# Patient Record
Sex: FEMALE | Race: WHITE | Employment: OTHER | URBAN - METROPOLITAN AREA
[De-identification: names, ages, dates, MRNs, and addresses within clinical notes are randomized per-mention and may not be internally consistent; named-entity substitution may affect disease eponyms.]

---

## 2020-01-01 ENCOUNTER — APPOINTMENT (OUTPATIENT)
Dept: GENERAL RADIOLOGY | Age: 75
DRG: 207 | End: 2020-01-01
Attending: INTERNAL MEDICINE
Payer: MEDICARE

## 2020-01-01 ENCOUNTER — HOSPITAL ENCOUNTER (INPATIENT)
Age: 75
LOS: 16 days | DRG: 207 | End: 2020-03-15
Attending: INTERNAL MEDICINE | Admitting: INTERNAL MEDICINE
Payer: MEDICARE

## 2020-01-01 ENCOUNTER — APPOINTMENT (OUTPATIENT)
Dept: CT IMAGING | Age: 75
DRG: 207 | End: 2020-01-01
Attending: INTERNAL MEDICINE
Payer: MEDICARE

## 2020-01-01 ENCOUNTER — APPOINTMENT (OUTPATIENT)
Dept: ULTRASOUND IMAGING | Age: 75
DRG: 207 | End: 2020-01-01
Attending: INTERNAL MEDICINE
Payer: MEDICARE

## 2020-01-01 VITALS
BODY MASS INDEX: 29.17 KG/M2 | SYSTOLIC BLOOD PRESSURE: 101 MMHG | RESPIRATION RATE: 22 BRPM | TEMPERATURE: 99.3 F | DIASTOLIC BLOOD PRESSURE: 52 MMHG | WEIGHT: 148.59 LBS | OXYGEN SATURATION: 93 % | HEIGHT: 60 IN | HEART RATE: 108 BPM

## 2020-01-01 LAB
-: ABNORMAL
-: NORMAL
-: NORMAL
ABSOLUTE EOS #: 0 K/UL (ref 0–0.4)
ABSOLUTE EOS #: 0.03 K/UL (ref 0–0.44)
ABSOLUTE EOS #: 0.04 K/UL (ref 0–0.4)
ABSOLUTE EOS #: 0.04 K/UL (ref 0–0.44)
ABSOLUTE EOS #: 0.05 K/UL (ref 0–0.44)
ABSOLUTE EOS #: 0.06 K/UL (ref 0–0.4)
ABSOLUTE EOS #: <0.03 K/UL (ref 0–0.44)
ABSOLUTE IMMATURE GRANULOCYTE: 0 K/UL (ref 0–0.3)
ABSOLUTE IMMATURE GRANULOCYTE: 0 K/UL (ref 0–0.3)
ABSOLUTE IMMATURE GRANULOCYTE: 0.06 K/UL (ref 0–0.3)
ABSOLUTE IMMATURE GRANULOCYTE: 0.07 K/UL (ref 0–0.3)
ABSOLUTE IMMATURE GRANULOCYTE: 0.09 K/UL (ref 0–0.3)
ABSOLUTE IMMATURE GRANULOCYTE: 0.09 K/UL (ref 0–0.3)
ABSOLUTE IMMATURE GRANULOCYTE: 0.1 K/UL (ref 0–0.3)
ABSOLUTE IMMATURE GRANULOCYTE: 0.11 K/UL (ref 0–0.3)
ABSOLUTE IMMATURE GRANULOCYTE: 0.11 K/UL (ref 0–0.3)
ABSOLUTE IMMATURE GRANULOCYTE: 0.13 K/UL (ref 0–0.3)
ABSOLUTE IMMATURE GRANULOCYTE: 0.17 K/UL (ref 0–0.3)
ABSOLUTE IMMATURE GRANULOCYTE: 0.18 K/UL (ref 0–0.3)
ABSOLUTE IMMATURE GRANULOCYTE: 0.2 K/UL (ref 0–0.3)
ABSOLUTE IMMATURE GRANULOCYTE: 0.21 K/UL (ref 0–0.3)
ABSOLUTE IMMATURE GRANULOCYTE: 0.27 K/UL (ref 0–0.3)
ABSOLUTE IMMATURE GRANULOCYTE: 0.48 K/UL (ref 0–0.3)
ABSOLUTE LYMPH #: 0 K/UL (ref 1–4.8)
ABSOLUTE LYMPH #: 0.2 K/UL (ref 1–4.8)
ABSOLUTE LYMPH #: 0.24 K/UL (ref 1–4.8)
ABSOLUTE LYMPH #: 0.36 K/UL (ref 1–4.8)
ABSOLUTE LYMPH #: 0.6 K/UL (ref 1–4.8)
ABSOLUTE LYMPH #: 0.87 K/UL (ref 1.1–3.7)
ABSOLUTE LYMPH #: 0.95 K/UL (ref 1.1–3.7)
ABSOLUTE LYMPH #: 1 K/UL (ref 1.1–3.7)
ABSOLUTE LYMPH #: 1 K/UL (ref 1.1–3.7)
ABSOLUTE LYMPH #: 1.09 K/UL (ref 1.1–3.7)
ABSOLUTE LYMPH #: 1.13 K/UL (ref 1.1–3.7)
ABSOLUTE LYMPH #: 1.13 K/UL (ref 1–4.8)
ABSOLUTE LYMPH #: 1.22 K/UL (ref 1–4.8)
ABSOLUTE LYMPH #: 1.38 K/UL (ref 1–4.8)
ABSOLUTE LYMPH #: 1.41 K/UL (ref 1.1–3.7)
ABSOLUTE LYMPH #: 1.62 K/UL (ref 1.1–3.7)
ABSOLUTE MONO #: 0.2 K/UL (ref 0.1–0.8)
ABSOLUTE MONO #: 0.32 K/UL (ref 0.1–0.8)
ABSOLUTE MONO #: 0.46 K/UL (ref 0.1–0.8)
ABSOLUTE MONO #: 0.5 K/UL (ref 0.1–0.8)
ABSOLUTE MONO #: 0.6 K/UL (ref 0.1–0.8)
ABSOLUTE MONO #: 0.62 K/UL (ref 0.1–1.2)
ABSOLUTE MONO #: 0.67 K/UL (ref 0.1–1.2)
ABSOLUTE MONO #: 0.72 K/UL (ref 0.1–0.8)
ABSOLUTE MONO #: 0.9 K/UL (ref 0.1–0.8)
ABSOLUTE MONO #: 1.01 K/UL (ref 0.1–1.2)
ABSOLUTE MONO #: 1.08 K/UL (ref 0.1–1.2)
ABSOLUTE MONO #: 1.14 K/UL (ref 0.1–1.2)
ABSOLUTE MONO #: 1.2 K/UL (ref 0.1–0.8)
ABSOLUTE MONO #: 1.25 K/UL (ref 0.1–1.2)
ABSOLUTE MONO #: 1.3 K/UL (ref 0.1–1.2)
ABSOLUTE MONO #: 1.31 K/UL (ref 0.1–1.2)
ACTION: NORMAL
ALBUMIN SERPL-MCNC: 2.8 G/DL (ref 3.5–5.2)
ALBUMIN/GLOBULIN RATIO: 1.3 (ref 1–2.5)
ALLEN TEST: ABNORMAL
ALLEN TEST: POSITIVE
ALP BLD-CCNC: 54 U/L (ref 35–104)
ALT SERPL-CCNC: 34 U/L (ref 5–33)
AMORPHOUS: ABNORMAL
AMORPHOUS: NORMAL
ANCA MYELOPEROXIDASE: 11 AU/ML
ANCA MYELOPEROXIDASE: 4 AU/ML
ANCA PROTEINASE 3: 10 AU/ML
ANCA PROTEINASE 3: 4 AU/ML
ANION GAP SERPL CALCULATED.3IONS-SCNC: 10 MMOL/L (ref 9–17)
ANION GAP SERPL CALCULATED.3IONS-SCNC: 11 MMOL/L (ref 9–17)
ANION GAP SERPL CALCULATED.3IONS-SCNC: 11 MMOL/L (ref 9–17)
ANION GAP SERPL CALCULATED.3IONS-SCNC: 12 MMOL/L (ref 9–17)
ANION GAP SERPL CALCULATED.3IONS-SCNC: 13 MMOL/L (ref 9–17)
ANION GAP SERPL CALCULATED.3IONS-SCNC: 13 MMOL/L (ref 9–17)
ANION GAP SERPL CALCULATED.3IONS-SCNC: 14 MMOL/L (ref 9–17)
ANION GAP SERPL CALCULATED.3IONS-SCNC: 15 MMOL/L (ref 9–17)
ANION GAP SERPL CALCULATED.3IONS-SCNC: 17 MMOL/L (ref 9–17)
ANTI-NUCLEAR ANTIBODY (ANA): NEGATIVE
AST SERPL-CCNC: 27 U/L
ATYPICAL LYMPHOCYTE ABSOLUTE COUNT: 0.13 K/UL
ATYPICAL LYMPHOCYTES: 2 %
BACTERIA: ABNORMAL
BACTERIA: NORMAL
BASOPHILS # BLD: 0 % (ref 0–2)
BASOPHILS ABSOLUTE: 0 K/UL (ref 0–0.2)
BASOPHILS ABSOLUTE: 0.03 K/UL (ref 0–0.2)
BASOPHILS ABSOLUTE: 0.04 K/UL (ref 0–0.2)
BASOPHILS ABSOLUTE: 0.05 K/UL (ref 0–0.2)
BASOPHILS ABSOLUTE: 0.05 K/UL (ref 0–0.2)
BASOPHILS ABSOLUTE: <0.03 K/UL (ref 0–0.2)
BILIRUB SERPL-MCNC: 0.21 MG/DL (ref 0.3–1.2)
BILIRUBIN URINE: NEGATIVE
BILIRUBIN URINE: NEGATIVE
BNP INTERPRETATION: ABNORMAL
BUN BLDV-MCNC: 100 MG/DL (ref 8–23)
BUN BLDV-MCNC: 104 MG/DL (ref 8–23)
BUN BLDV-MCNC: 22 MG/DL (ref 8–23)
BUN BLDV-MCNC: 25 MG/DL (ref 8–23)
BUN BLDV-MCNC: 27 MG/DL (ref 8–23)
BUN BLDV-MCNC: 29 MG/DL (ref 8–23)
BUN BLDV-MCNC: 33 MG/DL (ref 8–23)
BUN BLDV-MCNC: 39 MG/DL (ref 8–23)
BUN BLDV-MCNC: 41 MG/DL (ref 8–23)
BUN BLDV-MCNC: 48 MG/DL (ref 8–23)
BUN BLDV-MCNC: 53 MG/DL (ref 8–23)
BUN BLDV-MCNC: 54 MG/DL (ref 8–23)
BUN BLDV-MCNC: 62 MG/DL (ref 8–23)
BUN BLDV-MCNC: 72 MG/DL (ref 8–23)
BUN BLDV-MCNC: 77 MG/DL (ref 8–23)
BUN BLDV-MCNC: 85 MG/DL (ref 8–23)
BUN BLDV-MCNC: 87 MG/DL (ref 8–23)
BUN BLDV-MCNC: 95 MG/DL (ref 8–23)
BUN BLDV-MCNC: 98 MG/DL (ref 8–23)
BUN/CREAT BLD: ABNORMAL (ref 9–20)
CALCIUM SERPL-MCNC: 7.6 MG/DL (ref 8.6–10.4)
CALCIUM SERPL-MCNC: 7.6 MG/DL (ref 8.6–10.4)
CALCIUM SERPL-MCNC: 7.7 MG/DL (ref 8.6–10.4)
CALCIUM SERPL-MCNC: 7.9 MG/DL (ref 8.6–10.4)
CALCIUM SERPL-MCNC: 7.9 MG/DL (ref 8.6–10.4)
CALCIUM SERPL-MCNC: 8.6 MG/DL (ref 8.6–10.4)
CALCIUM SERPL-MCNC: 8.8 MG/DL (ref 8.6–10.4)
CALCIUM SERPL-MCNC: 8.9 MG/DL (ref 8.6–10.4)
CALCIUM SERPL-MCNC: 9 MG/DL (ref 8.6–10.4)
CALCIUM SERPL-MCNC: 9 MG/DL (ref 8.6–10.4)
CALCIUM SERPL-MCNC: 9.1 MG/DL (ref 8.6–10.4)
CALCIUM SERPL-MCNC: 9.4 MG/DL (ref 8.6–10.4)
CASTS UA: ABNORMAL /LPF (ref 0–8)
CASTS UA: NORMAL /LPF (ref 0–8)
CHLORIDE BLD-SCNC: 100 MMOL/L (ref 98–107)
CHLORIDE BLD-SCNC: 101 MMOL/L (ref 98–107)
CHLORIDE BLD-SCNC: 103 MMOL/L (ref 98–107)
CHLORIDE BLD-SCNC: 104 MMOL/L (ref 98–107)
CHLORIDE BLD-SCNC: 106 MMOL/L (ref 98–107)
CHLORIDE BLD-SCNC: 85 MMOL/L (ref 98–107)
CHLORIDE BLD-SCNC: 86 MMOL/L (ref 98–107)
CHLORIDE BLD-SCNC: 87 MMOL/L (ref 98–107)
CHLORIDE BLD-SCNC: 90 MMOL/L (ref 98–107)
CHLORIDE BLD-SCNC: 91 MMOL/L (ref 98–107)
CHLORIDE BLD-SCNC: 94 MMOL/L (ref 98–107)
CHLORIDE BLD-SCNC: 94 MMOL/L (ref 98–107)
CHLORIDE BLD-SCNC: 95 MMOL/L (ref 98–107)
CHLORIDE BLD-SCNC: 95 MMOL/L (ref 98–107)
CHLORIDE BLD-SCNC: 96 MMOL/L (ref 98–107)
CHLORIDE BLD-SCNC: 98 MMOL/L (ref 98–107)
CHLORIDE BLD-SCNC: 98 MMOL/L (ref 98–107)
CHLORIDE BLD-SCNC: 99 MMOL/L (ref 98–107)
CO2: 19 MMOL/L (ref 20–31)
CO2: 20 MMOL/L (ref 20–31)
CO2: 22 MMOL/L (ref 20–31)
CO2: 23 MMOL/L (ref 20–31)
CO2: 26 MMOL/L (ref 20–31)
CO2: 32 MMOL/L (ref 20–31)
CO2: 32 MMOL/L (ref 20–31)
CO2: 34 MMOL/L (ref 20–31)
CO2: 34 MMOL/L (ref 20–31)
CO2: 35 MMOL/L (ref 20–31)
CO2: 35 MMOL/L (ref 20–31)
CO2: 36 MMOL/L (ref 20–31)
CO2: 36 MMOL/L (ref 20–31)
CO2: 37 MMOL/L (ref 20–31)
CO2: 38 MMOL/L (ref 20–31)
CO2: 41 MMOL/L (ref 20–31)
COLOR: YELLOW
COLOR: YELLOW
COMMENT UA: ABNORMAL
COMMENT UA: ABNORMAL
COMPLEMENT C3: 70 MG/DL (ref 90–180)
COMPLEMENT C4: 29 MG/DL (ref 10–40)
CREAT SERPL-MCNC: 0.41 MG/DL (ref 0.5–0.9)
CREAT SERPL-MCNC: 0.42 MG/DL (ref 0.5–0.9)
CREAT SERPL-MCNC: 0.47 MG/DL (ref 0.5–0.9)
CREAT SERPL-MCNC: 0.62 MG/DL (ref 0.5–0.9)
CREAT SERPL-MCNC: 0.7 MG/DL (ref 0.5–0.9)
CREAT SERPL-MCNC: 0.73 MG/DL (ref 0.5–0.9)
CREAT SERPL-MCNC: 0.88 MG/DL (ref 0.5–0.9)
CREAT SERPL-MCNC: 0.91 MG/DL (ref 0.5–0.9)
CREAT SERPL-MCNC: 0.96 MG/DL (ref 0.5–0.9)
CREAT SERPL-MCNC: 1.26 MG/DL (ref 0.5–0.9)
CREAT SERPL-MCNC: 1.34 MG/DL (ref 0.5–0.9)
CREAT SERPL-MCNC: 1.65 MG/DL (ref 0.5–0.9)
CREAT SERPL-MCNC: 1.76 MG/DL (ref 0.5–0.9)
CREAT SERPL-MCNC: 2.05 MG/DL (ref 0.5–0.9)
CREAT SERPL-MCNC: 2.22 MG/DL (ref 0.5–0.9)
CREAT SERPL-MCNC: 2.41 MG/DL (ref 0.5–0.9)
CREAT SERPL-MCNC: 2.83 MG/DL (ref 0.5–0.9)
CREAT SERPL-MCNC: 2.87 MG/DL (ref 0.5–0.9)
CREAT SERPL-MCNC: 3.25 MG/DL (ref 0.5–0.9)
CREATININE URINE: 91.3 MG/DL (ref 28–217)
CREATININE URINE: 93 MG/DL (ref 28–217)
CRYSTALS, UA: ABNORMAL /HPF
CRYSTALS, UA: NORMAL /HPF
CULTURE: ABNORMAL
CULTURE: NO GROWTH
CULTURE: NO GROWTH
CULTURE: NORMAL
CULTURE: NORMAL
DATE AND TIME: NORMAL
DIFFERENTIAL TYPE: ABNORMAL
DIRECT EXAM: NORMAL
EKG ATRIAL RATE: 120 BPM
EKG ATRIAL RATE: 120 BPM
EKG ATRIAL RATE: 121 BPM
EKG ATRIAL RATE: 121 BPM
EKG ATRIAL RATE: 126 BPM
EKG ATRIAL RATE: 87 BPM
EKG ATRIAL RATE: 96 BPM
EKG P AXIS: 62 DEGREES
EKG P AXIS: 75 DEGREES
EKG P AXIS: 75 DEGREES
EKG P AXIS: 78 DEGREES
EKG P AXIS: 82 DEGREES
EKG P AXIS: 84 DEGREES
EKG P AXIS: 85 DEGREES
EKG P-R INTERVAL: 114 MS
EKG P-R INTERVAL: 120 MS
EKG P-R INTERVAL: 122 MS
EKG P-R INTERVAL: 134 MS
EKG P-R INTERVAL: 138 MS
EKG P-R INTERVAL: 150 MS
EKG P-R INTERVAL: 150 MS
EKG Q-T INTERVAL: 290 MS
EKG Q-T INTERVAL: 296 MS
EKG Q-T INTERVAL: 304 MS
EKG Q-T INTERVAL: 314 MS
EKG Q-T INTERVAL: 320 MS
EKG Q-T INTERVAL: 358 MS
EKG Q-T INTERVAL: 374 MS
EKG QRS DURATION: 66 MS
EKG QRS DURATION: 68 MS
EKG QRS DURATION: 68 MS
EKG QRS DURATION: 72 MS
EKG QRS DURATION: 72 MS
EKG QRS DURATION: 74 MS
EKG QRS DURATION: 76 MS
EKG QTC CALCULATION (BAZETT): 411 MS
EKG QTC CALCULATION (BAZETT): 418 MS
EKG QTC CALCULATION (BAZETT): 429 MS
EKG QTC CALCULATION (BAZETT): 445 MS
EKG QTC CALCULATION (BAZETT): 450 MS
EKG QTC CALCULATION (BAZETT): 452 MS
EKG QTC CALCULATION (BAZETT): 463 MS
EKG R AXIS: 76 DEGREES
EKG R AXIS: 80 DEGREES
EKG R AXIS: 81 DEGREES
EKG R AXIS: 82 DEGREES
EKG R AXIS: 82 DEGREES
EKG R AXIS: 84 DEGREES
EKG R AXIS: 86 DEGREES
EKG T AXIS: 86 DEGREES
EKG T AXIS: 88 DEGREES
EKG T AXIS: 90 DEGREES
EKG T AXIS: 90 DEGREES
EKG T AXIS: 91 DEGREES
EKG T AXIS: 94 DEGREES
EKG T AXIS: 99 DEGREES
EKG VENTRICULAR RATE: 120 BPM
EKG VENTRICULAR RATE: 120 BPM
EKG VENTRICULAR RATE: 121 BPM
EKG VENTRICULAR RATE: 121 BPM
EKG VENTRICULAR RATE: 126 BPM
EKG VENTRICULAR RATE: 87 BPM
EKG VENTRICULAR RATE: 96 BPM
EOSINOPHIL,URINE: NORMAL
EOSINOPHILS RELATIVE PERCENT: 0 % (ref 1–4)
EOSINOPHILS RELATIVE PERCENT: 1 % (ref 1–4)
EPITHELIAL CELLS UA: ABNORMAL /HPF (ref 0–5)
EPITHELIAL CELLS UA: NORMAL /HPF (ref 0–5)
ESTIMATED AVERAGE GLUCOSE: 157 MG/DL
FIO2: 100
FIO2: 30
FIO2: 30
FIO2: 35
FIO2: 40
FIO2: ABNORMAL
FREE KAPPA/LAMBDA RATIO: 2.1 (ref 0.26–1.65)
GBM ANTIBODY IGG: 10 AU/ML
GBM ANTIBODY IGG: 4 AU/ML
GFR AFRICAN AMERICAN: 17 ML/MIN
GFR AFRICAN AMERICAN: 19 ML/MIN
GFR AFRICAN AMERICAN: 20 ML/MIN
GFR AFRICAN AMERICAN: 24 ML/MIN
GFR AFRICAN AMERICAN: 26 ML/MIN
GFR AFRICAN AMERICAN: 29 ML/MIN
GFR AFRICAN AMERICAN: 34 ML/MIN
GFR AFRICAN AMERICAN: 37 ML/MIN
GFR AFRICAN AMERICAN: 47 ML/MIN
GFR AFRICAN AMERICAN: 50 ML/MIN
GFR AFRICAN AMERICAN: >60 ML/MIN
GFR NON-AFRICAN AMERICAN: 14 ML/MIN
GFR NON-AFRICAN AMERICAN: 16 ML/MIN
GFR NON-AFRICAN AMERICAN: 16 ML/MIN
GFR NON-AFRICAN AMERICAN: 20 ML/MIN
GFR NON-AFRICAN AMERICAN: 21 ML/MIN
GFR NON-AFRICAN AMERICAN: 24 ML/MIN
GFR NON-AFRICAN AMERICAN: 28 ML/MIN
GFR NON-AFRICAN AMERICAN: 30 ML/MIN
GFR NON-AFRICAN AMERICAN: 38 ML/MIN
GFR NON-AFRICAN AMERICAN: 41 ML/MIN
GFR NON-AFRICAN AMERICAN: 57 ML/MIN
GFR NON-AFRICAN AMERICAN: >60 ML/MIN
GFR SERPL CREATININE-BSD FRML MDRD: ABNORMAL ML/MIN/{1.73_M2}
GLUCOSE BLD-MCNC: 100 MG/DL (ref 74–100)
GLUCOSE BLD-MCNC: 106 MG/DL (ref 65–105)
GLUCOSE BLD-MCNC: 108 MG/DL (ref 65–105)
GLUCOSE BLD-MCNC: 108 MG/DL (ref 65–105)
GLUCOSE BLD-MCNC: 110 MG/DL (ref 70–99)
GLUCOSE BLD-MCNC: 111 MG/DL (ref 65–105)
GLUCOSE BLD-MCNC: 116 MG/DL (ref 65–105)
GLUCOSE BLD-MCNC: 117 MG/DL (ref 70–99)
GLUCOSE BLD-MCNC: 119 MG/DL (ref 65–105)
GLUCOSE BLD-MCNC: 122 MG/DL (ref 70–99)
GLUCOSE BLD-MCNC: 124 MG/DL (ref 65–105)
GLUCOSE BLD-MCNC: 125 MG/DL (ref 70–99)
GLUCOSE BLD-MCNC: 129 MG/DL (ref 65–105)
GLUCOSE BLD-MCNC: 129 MG/DL (ref 65–105)
GLUCOSE BLD-MCNC: 131 MG/DL (ref 65–105)
GLUCOSE BLD-MCNC: 134 MG/DL (ref 65–105)
GLUCOSE BLD-MCNC: 135 MG/DL (ref 65–105)
GLUCOSE BLD-MCNC: 135 MG/DL (ref 65–105)
GLUCOSE BLD-MCNC: 135 MG/DL (ref 74–100)
GLUCOSE BLD-MCNC: 137 MG/DL (ref 65–105)
GLUCOSE BLD-MCNC: 137 MG/DL (ref 65–105)
GLUCOSE BLD-MCNC: 138 MG/DL (ref 70–99)
GLUCOSE BLD-MCNC: 139 MG/DL (ref 65–105)
GLUCOSE BLD-MCNC: 141 MG/DL (ref 65–105)
GLUCOSE BLD-MCNC: 142 MG/DL (ref 65–105)
GLUCOSE BLD-MCNC: 142 MG/DL (ref 65–105)
GLUCOSE BLD-MCNC: 143 MG/DL (ref 65–105)
GLUCOSE BLD-MCNC: 145 MG/DL (ref 65–105)
GLUCOSE BLD-MCNC: 147 MG/DL (ref 65–105)
GLUCOSE BLD-MCNC: 147 MG/DL (ref 65–105)
GLUCOSE BLD-MCNC: 148 MG/DL (ref 65–105)
GLUCOSE BLD-MCNC: 149 MG/DL (ref 65–105)
GLUCOSE BLD-MCNC: 150 MG/DL (ref 65–105)
GLUCOSE BLD-MCNC: 151 MG/DL (ref 74–100)
GLUCOSE BLD-MCNC: 152 MG/DL (ref 70–99)
GLUCOSE BLD-MCNC: 152 MG/DL (ref 74–100)
GLUCOSE BLD-MCNC: 155 MG/DL (ref 65–105)
GLUCOSE BLD-MCNC: 156 MG/DL (ref 65–105)
GLUCOSE BLD-MCNC: 156 MG/DL (ref 70–99)
GLUCOSE BLD-MCNC: 156 MG/DL (ref 70–99)
GLUCOSE BLD-MCNC: 157 MG/DL (ref 65–105)
GLUCOSE BLD-MCNC: 157 MG/DL (ref 65–105)
GLUCOSE BLD-MCNC: 158 MG/DL (ref 65–105)
GLUCOSE BLD-MCNC: 159 MG/DL (ref 65–105)
GLUCOSE BLD-MCNC: 159 MG/DL (ref 65–105)
GLUCOSE BLD-MCNC: 159 MG/DL (ref 70–99)
GLUCOSE BLD-MCNC: 162 MG/DL (ref 65–105)
GLUCOSE BLD-MCNC: 162 MG/DL (ref 65–105)
GLUCOSE BLD-MCNC: 162 MG/DL (ref 74–100)
GLUCOSE BLD-MCNC: 163 MG/DL (ref 65–105)
GLUCOSE BLD-MCNC: 163 MG/DL (ref 65–105)
GLUCOSE BLD-MCNC: 164 MG/DL (ref 65–105)
GLUCOSE BLD-MCNC: 169 MG/DL (ref 65–105)
GLUCOSE BLD-MCNC: 169 MG/DL (ref 70–99)
GLUCOSE BLD-MCNC: 169 MG/DL (ref 74–100)
GLUCOSE BLD-MCNC: 171 MG/DL (ref 65–105)
GLUCOSE BLD-MCNC: 171 MG/DL (ref 74–100)
GLUCOSE BLD-MCNC: 173 MG/DL (ref 65–105)
GLUCOSE BLD-MCNC: 174 MG/DL (ref 65–105)
GLUCOSE BLD-MCNC: 174 MG/DL (ref 65–105)
GLUCOSE BLD-MCNC: 175 MG/DL (ref 65–105)
GLUCOSE BLD-MCNC: 176 MG/DL (ref 65–105)
GLUCOSE BLD-MCNC: 179 MG/DL (ref 65–105)
GLUCOSE BLD-MCNC: 180 MG/DL (ref 65–105)
GLUCOSE BLD-MCNC: 181 MG/DL (ref 70–99)
GLUCOSE BLD-MCNC: 182 MG/DL (ref 65–105)
GLUCOSE BLD-MCNC: 184 MG/DL (ref 65–105)
GLUCOSE BLD-MCNC: 186 MG/DL (ref 65–105)
GLUCOSE BLD-MCNC: 187 MG/DL (ref 65–105)
GLUCOSE BLD-MCNC: 188 MG/DL (ref 65–105)
GLUCOSE BLD-MCNC: 189 MG/DL (ref 65–105)
GLUCOSE BLD-MCNC: 190 MG/DL (ref 70–99)
GLUCOSE BLD-MCNC: 194 MG/DL (ref 74–100)
GLUCOSE BLD-MCNC: 196 MG/DL (ref 65–105)
GLUCOSE BLD-MCNC: 196 MG/DL (ref 70–99)
GLUCOSE BLD-MCNC: 197 MG/DL (ref 65–105)
GLUCOSE BLD-MCNC: 198 MG/DL (ref 65–105)
GLUCOSE BLD-MCNC: 198 MG/DL (ref 65–105)
GLUCOSE BLD-MCNC: 199 MG/DL (ref 65–105)
GLUCOSE BLD-MCNC: 201 MG/DL (ref 65–105)
GLUCOSE BLD-MCNC: 202 MG/DL (ref 65–105)
GLUCOSE BLD-MCNC: 203 MG/DL (ref 65–105)
GLUCOSE BLD-MCNC: 204 MG/DL (ref 65–105)
GLUCOSE BLD-MCNC: 206 MG/DL (ref 70–99)
GLUCOSE BLD-MCNC: 207 MG/DL (ref 65–105)
GLUCOSE BLD-MCNC: 209 MG/DL (ref 65–105)
GLUCOSE BLD-MCNC: 211 MG/DL (ref 65–105)
GLUCOSE BLD-MCNC: 215 MG/DL (ref 65–105)
GLUCOSE BLD-MCNC: 216 MG/DL (ref 65–105)
GLUCOSE BLD-MCNC: 219 MG/DL (ref 65–105)
GLUCOSE BLD-MCNC: 219 MG/DL (ref 65–105)
GLUCOSE BLD-MCNC: 221 MG/DL (ref 74–100)
GLUCOSE BLD-MCNC: 227 MG/DL (ref 65–105)
GLUCOSE BLD-MCNC: 244 MG/DL (ref 65–105)
GLUCOSE BLD-MCNC: 250 MG/DL (ref 70–99)
GLUCOSE BLD-MCNC: 262 MG/DL (ref 65–105)
GLUCOSE BLD-MCNC: 266 MG/DL (ref 65–105)
GLUCOSE BLD-MCNC: 286 MG/DL (ref 70–99)
GLUCOSE BLD-MCNC: 287 MG/DL (ref 65–105)
GLUCOSE BLD-MCNC: 289 MG/DL (ref 65–105)
GLUCOSE BLD-MCNC: 322 MG/DL (ref 74–100)
GLUCOSE BLD-MCNC: 325 MG/DL (ref 74–100)
GLUCOSE BLD-MCNC: 339 MG/DL (ref 74–100)
GLUCOSE BLD-MCNC: 75 MG/DL (ref 65–105)
GLUCOSE BLD-MCNC: 81 MG/DL (ref 65–105)
GLUCOSE BLD-MCNC: 82 MG/DL (ref 70–99)
GLUCOSE BLD-MCNC: 85 MG/DL (ref 65–105)
GLUCOSE BLD-MCNC: 88 MG/DL (ref 70–99)
GLUCOSE BLD-MCNC: 90 MG/DL (ref 70–99)
GLUCOSE BLD-MCNC: 91 MG/DL (ref 74–100)
GLUCOSE BLD-MCNC: 95 MG/DL (ref 65–105)
GLUCOSE URINE: ABNORMAL
GLUCOSE URINE: ABNORMAL
HAV IGM SER IA-ACNC: NONREACTIVE
HBA1C MFR BLD: 7.1 % (ref 4–6)
HCT VFR BLD CALC: 33 % (ref 36.3–47.1)
HCT VFR BLD CALC: 33.5 % (ref 36.3–47.1)
HCT VFR BLD CALC: 33.7 % (ref 36.3–47.1)
HCT VFR BLD CALC: 34.4 % (ref 36.3–47.1)
HCT VFR BLD CALC: 34.6 % (ref 36.3–47.1)
HCT VFR BLD CALC: 35.1 % (ref 36.3–47.1)
HCT VFR BLD CALC: 36.3 % (ref 36.3–47.1)
HCT VFR BLD CALC: 36.8 % (ref 36.3–47.1)
HCT VFR BLD CALC: 36.9 % (ref 36.3–47.1)
HCT VFR BLD CALC: 37.1 % (ref 36.3–47.1)
HCT VFR BLD CALC: 37.2 % (ref 36.3–47.1)
HCT VFR BLD CALC: 37.4 % (ref 36.3–47.1)
HCT VFR BLD CALC: 38.2 % (ref 36.3–47.1)
HCT VFR BLD CALC: 38.6 % (ref 36.3–47.1)
HCT VFR BLD CALC: 39.1 % (ref 36.3–47.1)
HCT VFR BLD CALC: 39.6 % (ref 36.3–47.1)
HCT VFR BLD CALC: 40.6 % (ref 36.3–47.1)
HCT VFR BLD CALC: 43.2 % (ref 36.3–47.1)
HEMOGLOBIN: 10.1 G/DL (ref 11.9–15.1)
HEMOGLOBIN: 10.1 G/DL (ref 11.9–15.1)
HEMOGLOBIN: 10.2 G/DL (ref 11.9–15.1)
HEMOGLOBIN: 10.3 G/DL (ref 11.9–15.1)
HEMOGLOBIN: 10.4 G/DL (ref 11.9–15.1)
HEMOGLOBIN: 10.5 G/DL (ref 11.9–15.1)
HEMOGLOBIN: 11.2 G/DL (ref 11.9–15.1)
HEMOGLOBIN: 11.3 G/DL (ref 11.9–15.1)
HEMOGLOBIN: 11.5 G/DL (ref 11.9–15.1)
HEMOGLOBIN: 11.7 G/DL (ref 11.9–15.1)
HEMOGLOBIN: 11.8 G/DL (ref 11.9–15.1)
HEMOGLOBIN: 11.8 G/DL (ref 11.9–15.1)
HEMOGLOBIN: 11.9 G/DL (ref 11.9–15.1)
HEMOGLOBIN: 12.1 G/DL (ref 11.9–15.1)
HEMOGLOBIN: 12.4 G/DL (ref 11.9–15.1)
HEMOGLOBIN: 12.5 G/DL (ref 11.9–15.1)
HEPATITIS B CORE IGM ANTIBODY: NONREACTIVE
HEPATITIS B SURFACE ANTIGEN: NONREACTIVE
HEPATITIS C ANTIBODY: NONREACTIVE
IMMATURE GRANULOCYTES: 0 %
IMMATURE GRANULOCYTES: 0 %
IMMATURE GRANULOCYTES: 1 %
IMMATURE GRANULOCYTES: 2 %
KAPPA FREE LIGHT CHAINS QNT: 2.08 MG/DL (ref 0.37–1.94)
KETONES, URINE: ABNORMAL
KETONES, URINE: NEGATIVE
LACTIC ACID, WHOLE BLOOD: 0.8 MMOL/L (ref 0.7–2.1)
LACTIC ACID: NORMAL MMOL/L
LAMBDA FREE LIGHT CHAINS QNT: 0.99 MG/DL (ref 0.57–2.63)
LEUKOCYTE ESTERASE, URINE: NEGATIVE
LEUKOCYTE ESTERASE, URINE: NEGATIVE
LV EF: 55 %
LVEF MODALITY: NORMAL
LYMPHOCYTES # BLD: 0 % (ref 24–44)
LYMPHOCYTES # BLD: 1 % (ref 24–44)
LYMPHOCYTES # BLD: 1 % (ref 24–44)
LYMPHOCYTES # BLD: 10 % (ref 24–44)
LYMPHOCYTES # BLD: 12 % (ref 24–43)
LYMPHOCYTES # BLD: 12 % (ref 24–44)
LYMPHOCYTES # BLD: 14 % (ref 24–44)
LYMPHOCYTES # BLD: 15 % (ref 24–43)
LYMPHOCYTES # BLD: 16 % (ref 24–43)
LYMPHOCYTES # BLD: 19 % (ref 24–44)
LYMPHOCYTES # BLD: 2 % (ref 24–44)
LYMPHOCYTES # BLD: 6 % (ref 24–43)
LYMPHOCYTES # BLD: 6 % (ref 24–43)
LYMPHOCYTES # BLD: 7 % (ref 24–43)
LYMPHOCYTES # BLD: 8 % (ref 24–43)
LYMPHOCYTES # BLD: 9 % (ref 24–43)
Lab: ABNORMAL
Lab: NORMAL
MAGNESIUM: 1.9 MG/DL (ref 1.6–2.6)
MAGNESIUM: 1.9 MG/DL (ref 1.6–2.6)
MAGNESIUM: 2 MG/DL (ref 1.6–2.6)
MCH RBC QN AUTO: 30 PG (ref 25.2–33.5)
MCH RBC QN AUTO: 30 PG (ref 25.2–33.5)
MCH RBC QN AUTO: 30.4 PG (ref 25.2–33.5)
MCH RBC QN AUTO: 30.5 PG (ref 25.2–33.5)
MCH RBC QN AUTO: 30.6 PG (ref 25.2–33.5)
MCH RBC QN AUTO: 30.7 PG (ref 25.2–33.5)
MCH RBC QN AUTO: 30.7 PG (ref 25.2–33.5)
MCH RBC QN AUTO: 30.8 PG (ref 25.2–33.5)
MCH RBC QN AUTO: 30.8 PG (ref 25.2–33.5)
MCH RBC QN AUTO: 30.9 PG (ref 25.2–33.5)
MCH RBC QN AUTO: 30.9 PG (ref 25.2–33.5)
MCH RBC QN AUTO: 31.2 PG (ref 25.2–33.5)
MCH RBC QN AUTO: 31.2 PG (ref 25.2–33.5)
MCH RBC QN AUTO: 31.3 PG (ref 25.2–33.5)
MCHC RBC AUTO-ENTMCNC: 27.5 G/DL (ref 28.4–34.8)
MCHC RBC AUTO-ENTMCNC: 28.9 G/DL (ref 28.4–34.8)
MCHC RBC AUTO-ENTMCNC: 29.6 G/DL (ref 28.4–34.8)
MCHC RBC AUTO-ENTMCNC: 30.1 G/DL (ref 28.4–34.8)
MCHC RBC AUTO-ENTMCNC: 30.1 G/DL (ref 28.4–34.8)
MCHC RBC AUTO-ENTMCNC: 30.2 G/DL (ref 28.4–34.8)
MCHC RBC AUTO-ENTMCNC: 30.3 G/DL (ref 28.4–34.8)
MCHC RBC AUTO-ENTMCNC: 30.4 G/DL (ref 28.4–34.8)
MCHC RBC AUTO-ENTMCNC: 30.5 G/DL (ref 28.4–34.8)
MCHC RBC AUTO-ENTMCNC: 30.5 G/DL (ref 28.4–34.8)
MCHC RBC AUTO-ENTMCNC: 30.6 G/DL (ref 28.4–34.8)
MCHC RBC AUTO-ENTMCNC: 30.9 G/DL (ref 28.4–34.8)
MCHC RBC AUTO-ENTMCNC: 31.3 G/DL (ref 28.4–34.8)
MCHC RBC AUTO-ENTMCNC: 32.1 G/DL (ref 28.4–34.8)
MCHC RBC AUTO-ENTMCNC: 32.3 G/DL (ref 28.4–34.8)
MCHC RBC AUTO-ENTMCNC: 32.5 G/DL (ref 28.4–34.8)
MCHC RBC AUTO-ENTMCNC: 32.6 G/DL (ref 28.4–34.8)
MCHC RBC AUTO-ENTMCNC: 33.3 G/DL (ref 28.4–34.8)
MCV RBC AUTO: 100.3 FL (ref 82.6–102.9)
MCV RBC AUTO: 100.9 FL (ref 82.6–102.9)
MCV RBC AUTO: 101 FL (ref 82.6–102.9)
MCV RBC AUTO: 101.4 FL (ref 82.6–102.9)
MCV RBC AUTO: 101.8 FL (ref 82.6–102.9)
MCV RBC AUTO: 102.4 FL (ref 82.6–102.9)
MCV RBC AUTO: 102.4 FL (ref 82.6–102.9)
MCV RBC AUTO: 103.2 FL (ref 82.6–102.9)
MCV RBC AUTO: 103.6 FL (ref 82.6–102.9)
MCV RBC AUTO: 110.3 FL (ref 82.6–102.9)
MCV RBC AUTO: 93.6 FL (ref 82.6–102.9)
MCV RBC AUTO: 93.9 FL (ref 82.6–102.9)
MCV RBC AUTO: 95 FL (ref 82.6–102.9)
MCV RBC AUTO: 95.5 FL (ref 82.6–102.9)
MCV RBC AUTO: 95.6 FL (ref 82.6–102.9)
MCV RBC AUTO: 97.1 FL (ref 82.6–102.9)
MCV RBC AUTO: 98.7 FL (ref 82.6–102.9)
MCV RBC AUTO: 99.5 FL (ref 82.6–102.9)
MODE: ABNORMAL
MONOCYTES # BLD: 1 % (ref 1–7)
MONOCYTES # BLD: 10 % (ref 3–12)
MONOCYTES # BLD: 11 % (ref 3–12)
MONOCYTES # BLD: 14 % (ref 1–7)
MONOCYTES # BLD: 3 % (ref 1–7)
MONOCYTES # BLD: 4 % (ref 1–7)
MONOCYTES # BLD: 4 % (ref 1–7)
MONOCYTES # BLD: 5 % (ref 1–7)
MONOCYTES # BLD: 5 % (ref 1–7)
MONOCYTES # BLD: 7 % (ref 3–12)
MONOCYTES # BLD: 8 % (ref 1–7)
MONOCYTES # BLD: 8 % (ref 3–12)
MONOCYTES # BLD: 9 % (ref 3–12)
MORPHOLOGY: ABNORMAL
MORPHOLOGY: NORMAL
MRSA, DNA, NASAL: NORMAL
MUCUS: ABNORMAL
MUCUS: NORMAL
MYOGLOBIN URINE: 2 MG/L (ref 0–1)
NEGATIVE BASE EXCESS, ART: 2 (ref 0–2)
NEGATIVE BASE EXCESS, ART: 3 (ref 0–2)
NEGATIVE BASE EXCESS, ART: 4 (ref 0–2)
NEGATIVE BASE EXCESS, ART: 5 (ref 0–2)
NEGATIVE BASE EXCESS, ART: 7 (ref 0–2)
NEGATIVE BASE EXCESS, ART: 7 (ref 0–2)
NEGATIVE BASE EXCESS, ART: ABNORMAL (ref 0–2)
NITRITE, URINE: NEGATIVE
NITRITE, URINE: NEGATIVE
NOTIFY: NORMAL
NRBC AUTOMATED: 0 PER 100 WBC
NRBC AUTOMATED: 0.1 PER 100 WBC
NRBC AUTOMATED: 0.2 PER 100 WBC
NRBC AUTOMATED: 0.3 PER 100 WBC
NRBC AUTOMATED: 0.4 PER 100 WBC
NRBC AUTOMATED: 0.5 PER 100 WBC
NRBC AUTOMATED: 0.5 PER 100 WBC
NRBC AUTOMATED: 0.8 PER 100 WBC
NRBC AUTOMATED: 1 PER 100 WBC
NRBC AUTOMATED: 1.3 PER 100 WBC
NRBC AUTOMATED: 2.1 PER 100 WBC
NUCLEATED RED BLOOD CELLS: 1 PER 100 WBC
NUCLEATED RED BLOOD CELLS: 1 PER 100 WBC
NUCLEATED RED BLOOD CELLS: 2 PER 100 WBC
NUCLEATED RED BLOOD CELLS: 3 PER 100 WBC
O2 DEVICE/FLOW/%: ABNORMAL
OTHER OBSERVATIONS UA: ABNORMAL
OTHER OBSERVATIONS UA: NORMAL
PARTIAL THROMBOPLASTIN TIME: 100 SEC (ref 20.5–30.5)
PARTIAL THROMBOPLASTIN TIME: 112 SEC (ref 20.5–30.5)
PARTIAL THROMBOPLASTIN TIME: 112.6 SEC (ref 20.5–30.5)
PARTIAL THROMBOPLASTIN TIME: 21.2 SEC (ref 20.5–30.5)
PARTIAL THROMBOPLASTIN TIME: 26.2 SEC (ref 20.5–30.5)
PARTIAL THROMBOPLASTIN TIME: 38.6 SEC (ref 20.5–30.5)
PARTIAL THROMBOPLASTIN TIME: 40.3 SEC (ref 20.5–30.5)
PARTIAL THROMBOPLASTIN TIME: 42 SEC (ref 20.5–30.5)
PARTIAL THROMBOPLASTIN TIME: 42.4 SEC (ref 20.5–30.5)
PARTIAL THROMBOPLASTIN TIME: 43.1 SEC (ref 20.5–30.5)
PARTIAL THROMBOPLASTIN TIME: 45.7 SEC (ref 20.5–30.5)
PARTIAL THROMBOPLASTIN TIME: 47.6 SEC (ref 20.5–30.5)
PARTIAL THROMBOPLASTIN TIME: 48.5 SEC (ref 20.5–30.5)
PARTIAL THROMBOPLASTIN TIME: 48.8 SEC (ref 20.5–30.5)
PARTIAL THROMBOPLASTIN TIME: 49.4 SEC (ref 20.5–30.5)
PARTIAL THROMBOPLASTIN TIME: 51 SEC (ref 20.5–30.5)
PARTIAL THROMBOPLASTIN TIME: 51.2 SEC (ref 20.5–30.5)
PARTIAL THROMBOPLASTIN TIME: 52.4 SEC (ref 20.5–30.5)
PARTIAL THROMBOPLASTIN TIME: 53 SEC (ref 20.5–30.5)
PARTIAL THROMBOPLASTIN TIME: 53.2 SEC (ref 20.5–30.5)
PARTIAL THROMBOPLASTIN TIME: 54.3 SEC (ref 20.5–30.5)
PARTIAL THROMBOPLASTIN TIME: 56.1 SEC (ref 20.5–30.5)
PARTIAL THROMBOPLASTIN TIME: 59.6 SEC (ref 20.5–30.5)
PARTIAL THROMBOPLASTIN TIME: 60 SEC (ref 20.5–30.5)
PARTIAL THROMBOPLASTIN TIME: 63.8 SEC (ref 20.5–30.5)
PARTIAL THROMBOPLASTIN TIME: 66.8 SEC (ref 20.5–30.5)
PARTIAL THROMBOPLASTIN TIME: 69.4 SEC (ref 20.5–30.5)
PARTIAL THROMBOPLASTIN TIME: 73.9 SEC (ref 20.5–30.5)
PARTIAL THROMBOPLASTIN TIME: 75.7 SEC (ref 20.5–30.5)
PARTIAL THROMBOPLASTIN TIME: 78.2 SEC (ref 20.5–30.5)
PARTIAL THROMBOPLASTIN TIME: 92.6 SEC (ref 20.5–30.5)
PARTIAL THROMBOPLASTIN TIME: 93.1 SEC (ref 20.5–30.5)
PARTIAL THROMBOPLASTIN TIME: >120 SEC (ref 20.5–30.5)
PARTIAL THROMBOPLASTIN TIME: >120 SEC (ref 20.5–30.5)
PATHOLOGIST: NORMAL
PATIENT TEMP: 37
PATIENT TEMP: ABNORMAL
PDW BLD-RTO: 14.3 % (ref 11.8–14.4)
PDW BLD-RTO: 14.3 % (ref 11.8–14.4)
PDW BLD-RTO: 14.6 % (ref 11.8–14.4)
PDW BLD-RTO: 14.7 % (ref 11.8–14.4)
PDW BLD-RTO: 14.9 % (ref 11.8–14.4)
PDW BLD-RTO: 15 % (ref 11.8–14.4)
PDW BLD-RTO: 15.1 % (ref 11.8–14.4)
PDW BLD-RTO: 15.1 % (ref 11.8–14.4)
PDW BLD-RTO: 15.2 % (ref 11.8–14.4)
PDW BLD-RTO: 15.3 % (ref 11.8–14.4)
PDW BLD-RTO: 15.3 % (ref 11.8–14.4)
PDW BLD-RTO: 15.4 % (ref 11.8–14.4)
PDW BLD-RTO: 15.5 % (ref 11.8–14.4)
PH UA: 5 (ref 5–8)
PH UA: 5 (ref 5–8)
PLATELET # BLD: 119 K/UL (ref 138–453)
PLATELET # BLD: 125 K/UL (ref 138–453)
PLATELET # BLD: 143 K/UL (ref 138–453)
PLATELET # BLD: 147 K/UL (ref 138–453)
PLATELET # BLD: 191 K/UL (ref 138–453)
PLATELET # BLD: 210 K/UL (ref 138–453)
PLATELET # BLD: 216 K/UL (ref 138–453)
PLATELET # BLD: 218 K/UL (ref 138–453)
PLATELET # BLD: 229 K/UL (ref 138–453)
PLATELET # BLD: 233 K/UL (ref 138–453)
PLATELET # BLD: 236 K/UL (ref 138–453)
PLATELET # BLD: 242 K/UL (ref 138–453)
PLATELET # BLD: 243 K/UL (ref 138–453)
PLATELET # BLD: 247 K/UL (ref 138–453)
PLATELET # BLD: 248 K/UL (ref 138–453)
PLATELET # BLD: ABNORMAL K/UL (ref 138–453)
PLATELET ESTIMATE: ABNORMAL
PLATELET, FLUORESCENCE: 106 K/UL (ref 138–453)
PLATELET, FLUORESCENCE: 116 K/UL (ref 138–453)
PLATELET, FLUORESCENCE: NORMAL K/UL (ref 138–453)
PLATELET, IMMATURE FRACTION: 4.9 % (ref 1.1–10.3)
PLATELET, IMMATURE FRACTION: 6.2 % (ref 1.1–10.3)
PLATELET, IMMATURE FRACTION: NORMAL % (ref 1.1–10.3)
PMV BLD AUTO: 10 FL (ref 8.1–13.5)
PMV BLD AUTO: 10.4 FL (ref 8.1–13.5)
PMV BLD AUTO: 10.6 FL (ref 8.1–13.5)
PMV BLD AUTO: 10.9 FL (ref 8.1–13.5)
PMV BLD AUTO: 11.3 FL (ref 8.1–13.5)
PMV BLD AUTO: 11.3 FL (ref 8.1–13.5)
PMV BLD AUTO: 11.4 FL (ref 8.1–13.5)
PMV BLD AUTO: 11.4 FL (ref 8.1–13.5)
PMV BLD AUTO: 11.5 FL (ref 8.1–13.5)
PMV BLD AUTO: 11.7 FL (ref 8.1–13.5)
PMV BLD AUTO: 11.7 FL (ref 8.1–13.5)
PMV BLD AUTO: 11.8 FL (ref 8.1–13.5)
PMV BLD AUTO: 12.1 FL (ref 8.1–13.5)
PMV BLD AUTO: ABNORMAL FL (ref 8.1–13.5)
POC HCO3: 24.4 MMOL/L (ref 21–28)
POC HCO3: 25.2 MMOL/L (ref 21–28)
POC HCO3: 25.2 MMOL/L (ref 21–28)
POC HCO3: 25.3 MMOL/L (ref 21–28)
POC HCO3: 25.7 MMOL/L (ref 21–28)
POC HCO3: 25.8 MMOL/L (ref 21–28)
POC HCO3: 25.9 MMOL/L (ref 21–28)
POC HCO3: 26.3 MMOL/L (ref 21–28)
POC HCO3: 26.5 MMOL/L (ref 21–28)
POC HCO3: 26.7 MMOL/L (ref 21–28)
POC HCO3: 26.8 MMOL/L (ref 21–28)
POC HCO3: 27 MMOL/L (ref 21–28)
POC HCO3: 27.2 MMOL/L (ref 21–28)
POC HCO3: 28.1 MMOL/L (ref 21–28)
POC HCO3: 28.6 MMOL/L (ref 21–28)
POC HCO3: 30.6 MMOL/L (ref 21–28)
POC HCO3: 37.5 MMOL/L (ref 21–28)
POC HCO3: 39.4 MMOL/L (ref 21–28)
POC HCO3: 40.6 MMOL/L (ref 21–28)
POC HCO3: 41.2 MMOL/L (ref 21–28)
POC HCO3: 42.2 MMOL/L (ref 21–28)
POC HCO3: 42.3 MMOL/L (ref 21–28)
POC HCO3: 42.8 MMOL/L (ref 21–28)
POC HCO3: 43.5 MMOL/L (ref 21–28)
POC HCO3: 43.8 MMOL/L (ref 21–28)
POC HCO3: 44.1 MMOL/L (ref 21–28)
POC HCO3: 44.5 MMOL/L (ref 21–28)
POC HCO3: 47 MMOL/L (ref 21–28)
POC HCO3: 48.6 MMOL/L (ref 21–28)
POC HCO3: 50.4 MMOL/L (ref 21–28)
POC LACTIC ACID: 0.51 MMOL/L (ref 0.56–1.39)
POC LACTIC ACID: 0.96 MMOL/L (ref 0.56–1.39)
POC LACTIC ACID: 1.02 MMOL/L (ref 0.56–1.39)
POC LACTIC ACID: 1.66 MMOL/L (ref 0.56–1.39)
POC LACTIC ACID: 2.12 MMOL/L (ref 0.56–1.39)
POC LACTIC ACID: 2.19 MMOL/L (ref 0.56–1.39)
POC LACTIC ACID: <0.3 MMOL/L (ref 0.56–1.39)
POC O2 SATURATION: 100 % (ref 94–98)
POC O2 SATURATION: 84 % (ref 94–98)
POC O2 SATURATION: 90 % (ref 94–98)
POC O2 SATURATION: 91 % (ref 94–98)
POC O2 SATURATION: 91 % (ref 94–98)
POC O2 SATURATION: 92 % (ref 94–98)
POC O2 SATURATION: 93 % (ref 94–98)
POC O2 SATURATION: 94 % (ref 94–98)
POC O2 SATURATION: 95 % (ref 94–98)
POC O2 SATURATION: 96 % (ref 94–98)
POC O2 SATURATION: 96 % (ref 94–98)
POC O2 SATURATION: 97 % (ref 94–98)
POC O2 SATURATION: 98 % (ref 94–98)
POC O2 SATURATION: 99 % (ref 94–98)
POC O2 SATURATION: 99 % (ref 94–98)
POC PCO2 TEMP: ABNORMAL MM HG
POC PCO2: 42.7 MM HG (ref 35–48)
POC PCO2: 53.5 MM HG (ref 35–48)
POC PCO2: 54.9 MM HG (ref 35–48)
POC PCO2: 56.5 MM HG (ref 35–48)
POC PCO2: 56.8 MM HG (ref 35–48)
POC PCO2: 57.7 MM HG (ref 35–48)
POC PCO2: 57.8 MM HG (ref 35–48)
POC PCO2: 59.9 MM HG (ref 35–48)
POC PCO2: 62.8 MM HG (ref 35–48)
POC PCO2: 63 MM HG (ref 35–48)
POC PCO2: 66.4 MM HG (ref 35–48)
POC PCO2: 66.5 MM HG (ref 35–48)
POC PCO2: 67.5 MM HG (ref 35–48)
POC PCO2: 69.4 MM HG (ref 35–48)
POC PCO2: 69.4 MM HG (ref 35–48)
POC PCO2: 70.3 MM HG (ref 35–48)
POC PCO2: 70.4 MM HG (ref 35–48)
POC PCO2: 71 MM HG (ref 35–48)
POC PCO2: 74.1 MM HG (ref 35–48)
POC PCO2: 74.5 MM HG (ref 35–48)
POC PCO2: 75.6 MM HG (ref 35–48)
POC PCO2: 76.1 MM HG (ref 35–48)
POC PCO2: 77.1 MM HG (ref 35–48)
POC PCO2: 82.9 MM HG (ref 35–48)
POC PCO2: 83.3 MM HG (ref 35–48)
POC PCO2: 86.8 MM HG (ref 35–48)
POC PCO2: 87.2 MM HG (ref 35–48)
POC PCO2: 87.5 MM HG (ref 35–48)
POC PCO2: 90.6 MM HG (ref 35–48)
POC PCO2: 96 MM HG (ref 35–48)
POC PH TEMP: ABNORMAL
POC PH: 7.05 (ref 7.35–7.45)
POC PH: 7.06 (ref 7.35–7.45)
POC PH: 7.09 (ref 7.35–7.45)
POC PH: 7.1 (ref 7.35–7.45)
POC PH: 7.13 (ref 7.35–7.45)
POC PH: 7.14 (ref 7.35–7.45)
POC PH: 7.14 (ref 7.35–7.45)
POC PH: 7.16 (ref 7.35–7.45)
POC PH: 7.16 (ref 7.35–7.45)
POC PH: 7.17 (ref 7.35–7.45)
POC PH: 7.18 (ref 7.35–7.45)
POC PH: 7.19 (ref 7.35–7.45)
POC PH: 7.19 (ref 7.35–7.45)
POC PH: 7.2 (ref 7.35–7.45)
POC PH: 7.26 (ref 7.35–7.45)
POC PH: 7.35 (ref 7.35–7.45)
POC PH: 7.36 (ref 7.35–7.45)
POC PH: 7.36 (ref 7.35–7.45)
POC PH: 7.37 (ref 7.35–7.45)
POC PH: 7.38 (ref 7.35–7.45)
POC PH: 7.38 (ref 7.35–7.45)
POC PH: 7.42 (ref 7.35–7.45)
POC PH: 7.44 (ref 7.35–7.45)
POC PH: 7.45 (ref 7.35–7.45)
POC PH: 7.45 (ref 7.35–7.45)
POC PH: 7.48 (ref 7.35–7.45)
POC PH: 7.49 (ref 7.35–7.45)
POC PH: 7.5 (ref 7.35–7.45)
POC PH: 7.5 (ref 7.35–7.45)
POC PH: 7.6 (ref 7.35–7.45)
POC PO2 TEMP: ABNORMAL MM HG
POC PO2: 100.1 MM HG (ref 83–108)
POC PO2: 101 MM HG (ref 83–108)
POC PO2: 101 MM HG (ref 83–108)
POC PO2: 102.4 MM HG (ref 83–108)
POC PO2: 103.2 MM HG (ref 83–108)
POC PO2: 105.3 MM HG (ref 83–108)
POC PO2: 106.7 MM HG (ref 83–108)
POC PO2: 107.5 MM HG (ref 83–108)
POC PO2: 108.8 MM HG (ref 83–108)
POC PO2: 109 MM HG (ref 83–108)
POC PO2: 109.9 MM HG (ref 83–108)
POC PO2: 110.6 MM HG (ref 83–108)
POC PO2: 111 MM HG (ref 83–108)
POC PO2: 116.5 MM HG (ref 83–108)
POC PO2: 120.3 MM HG (ref 83–108)
POC PO2: 122.8 MM HG (ref 83–108)
POC PO2: 129.7 MM HG (ref 83–108)
POC PO2: 129.8 MM HG (ref 83–108)
POC PO2: 145.5 MM HG (ref 83–108)
POC PO2: 419.1 MM HG (ref 83–108)
POC PO2: 61.6 MM HG (ref 83–108)
POC PO2: 65.4 MM HG (ref 83–108)
POC PO2: 66.7 MM HG (ref 83–108)
POC PO2: 71.4 MM HG (ref 83–108)
POC PO2: 76.3 MM HG (ref 83–108)
POC PO2: 78.2 MM HG (ref 83–108)
POC PO2: 82.5 MM HG (ref 83–108)
POC PO2: 84.3 MM HG (ref 83–108)
POC PO2: 85.5 MM HG (ref 83–108)
POC PO2: 86.4 MM HG (ref 83–108)
POSITIVE BASE EXCESS, ART: 12 (ref 0–3)
POSITIVE BASE EXCESS, ART: 12 (ref 0–3)
POSITIVE BASE EXCESS, ART: 13 (ref 0–3)
POSITIVE BASE EXCESS, ART: 14 (ref 0–3)
POSITIVE BASE EXCESS, ART: 14 (ref 0–3)
POSITIVE BASE EXCESS, ART: 15 (ref 0–3)
POSITIVE BASE EXCESS, ART: 16 (ref 0–3)
POSITIVE BASE EXCESS, ART: 17 (ref 0–3)
POSITIVE BASE EXCESS, ART: 17 (ref 0–3)
POSITIVE BASE EXCESS, ART: 18 (ref 0–3)
POSITIVE BASE EXCESS, ART: 19 (ref 0–3)
POSITIVE BASE EXCESS, ART: 19 (ref 0–3)
POSITIVE BASE EXCESS, ART: 21 (ref 0–3)
POSITIVE BASE EXCESS, ART: 22 (ref 0–3)
POSITIVE BASE EXCESS, ART: 4 (ref 0–3)
POSITIVE BASE EXCESS, ART: ABNORMAL (ref 0–3)
POTASSIUM SERPL-SCNC: 3 MMOL/L (ref 3.7–5.3)
POTASSIUM SERPL-SCNC: 3.3 MMOL/L (ref 3.7–5.3)
POTASSIUM SERPL-SCNC: 3.6 MMOL/L (ref 3.7–5.3)
POTASSIUM SERPL-SCNC: 3.7 MMOL/L (ref 3.7–5.3)
POTASSIUM SERPL-SCNC: 3.9 MMOL/L (ref 3.7–5.3)
POTASSIUM SERPL-SCNC: 4 MMOL/L (ref 3.7–5.3)
POTASSIUM SERPL-SCNC: 4.1 MMOL/L (ref 3.7–5.3)
POTASSIUM SERPL-SCNC: 4.1 MMOL/L (ref 3.7–5.3)
POTASSIUM SERPL-SCNC: 4.2 MMOL/L (ref 3.7–5.3)
POTASSIUM SERPL-SCNC: 4.3 MMOL/L (ref 3.7–5.3)
POTASSIUM SERPL-SCNC: 4.5 MMOL/L (ref 3.7–5.3)
POTASSIUM SERPL-SCNC: 4.5 MMOL/L (ref 3.7–5.3)
POTASSIUM SERPL-SCNC: 4.6 MMOL/L (ref 3.7–5.3)
POTASSIUM SERPL-SCNC: 4.9 MMOL/L (ref 3.7–5.3)
POTASSIUM SERPL-SCNC: 4.9 MMOL/L (ref 3.7–5.3)
POTASSIUM SERPL-SCNC: 5.3 MMOL/L (ref 3.7–5.3)
POTASSIUM SERPL-SCNC: 5.3 MMOL/L (ref 3.7–5.3)
POTASSIUM SERPL-SCNC: 5.4 MMOL/L (ref 3.7–5.3)
POTASSIUM SERPL-SCNC: 5.5 MMOL/L (ref 3.7–5.3)
PRO-BNP: 4794 PG/ML
PROTEIN UA: ABNORMAL
PROTEIN UA: ABNORMAL
RBC # BLD: 3.27 M/UL (ref 3.95–5.11)
RBC # BLD: 3.29 M/UL (ref 3.95–5.11)
RBC # BLD: 3.34 M/UL (ref 3.95–5.11)
RBC # BLD: 3.36 M/UL (ref 3.95–5.11)
RBC # BLD: 3.39 M/UL (ref 3.95–5.11)
RBC # BLD: 3.4 M/UL (ref 3.95–5.11)
RBC # BLD: 3.64 M/UL (ref 3.95–5.11)
RBC # BLD: 3.7 M/UL (ref 3.95–5.11)
RBC # BLD: 3.75 M/UL (ref 3.95–5.11)
RBC # BLD: 3.77 M/UL (ref 3.95–5.11)
RBC # BLD: 3.82 M/UL (ref 3.95–5.11)
RBC # BLD: 3.88 M/UL (ref 3.95–5.11)
RBC # BLD: 3.92 M/UL (ref 3.95–5.11)
RBC # BLD: 3.93 M/UL (ref 3.95–5.11)
RBC # BLD: 4.02 M/UL (ref 3.95–5.11)
RBC # BLD: 4.04 M/UL (ref 3.95–5.11)
RBC # BLD: 4.08 M/UL (ref 3.95–5.11)
RBC # BLD: 4.17 M/UL (ref 3.95–5.11)
RBC # BLD: ABNORMAL 10*6/UL
RBC UA: ABNORMAL /HPF (ref 0–4)
RBC UA: NORMAL /HPF (ref 0–4)
READ BACK: YES
REASON FOR REJECTION: NORMAL
RENAL EPITHELIAL, UA: ABNORMAL /HPF
RENAL EPITHELIAL, UA: NORMAL /HPF
SAMPLE SITE: ABNORMAL
SEG NEUTROPHILS: 69 % (ref 36–66)
SEG NEUTROPHILS: 72 % (ref 36–66)
SEG NEUTROPHILS: 74 % (ref 36–65)
SEG NEUTROPHILS: 75 % (ref 36–65)
SEG NEUTROPHILS: 76 % (ref 36–65)
SEG NEUTROPHILS: 78 % (ref 36–65)
SEG NEUTROPHILS: 81 % (ref 36–66)
SEG NEUTROPHILS: 83 % (ref 36–65)
SEG NEUTROPHILS: 84 % (ref 36–65)
SEG NEUTROPHILS: 84 % (ref 36–66)
SEG NEUTROPHILS: 85 % (ref 36–65)
SEG NEUTROPHILS: 86 % (ref 36–65)
SEG NEUTROPHILS: 92 % (ref 36–66)
SEG NEUTROPHILS: 93 % (ref 36–66)
SEG NEUTROPHILS: 96 % (ref 36–66)
SEG NEUTROPHILS: 98 % (ref 36–66)
SEGMENTED NEUTROPHILS ABSOLUTE COUNT: 10.29 K/UL (ref 1.5–8.1)
SEGMENTED NEUTROPHILS ABSOLUTE COUNT: 10.91 K/UL (ref 1.5–8.1)
SEGMENTED NEUTROPHILS ABSOLUTE COUNT: 11.94 K/UL (ref 1.5–8.1)
SEGMENTED NEUTROPHILS ABSOLUTE COUNT: 12.86 K/UL (ref 1.5–8.1)
SEGMENTED NEUTROPHILS ABSOLUTE COUNT: 14.6 K/UL (ref 1.5–8.1)
SEGMENTED NEUTROPHILS ABSOLUTE COUNT: 15.83 K/UL (ref 1.8–7.7)
SEGMENTED NEUTROPHILS ABSOLUTE COUNT: 16.84 K/UL (ref 1.8–7.7)
SEGMENTED NEUTROPHILS ABSOLUTE COUNT: 19.4 K/UL (ref 1.8–7.7)
SEGMENTED NEUTROPHILS ABSOLUTE COUNT: 2.97 K/UL (ref 1.8–7.7)
SEGMENTED NEUTROPHILS ABSOLUTE COUNT: 21.98 K/UL (ref 1.8–7.7)
SEGMENTED NEUTROPHILS ABSOLUTE COUNT: 4.61 K/UL (ref 1.8–7.7)
SEGMENTED NEUTROPHILS ABSOLUTE COUNT: 5.16 K/UL (ref 1.5–8.1)
SEGMENTED NEUTROPHILS ABSOLUTE COUNT: 6.6 K/UL (ref 1.5–8.1)
SEGMENTED NEUTROPHILS ABSOLUTE COUNT: 9.16 K/UL (ref 1.8–7.7)
SEGMENTED NEUTROPHILS ABSOLUTE COUNT: 9.66 K/UL (ref 1.8–7.7)
SEGMENTED NEUTROPHILS ABSOLUTE COUNT: 9.71 K/UL (ref 1.5–8.1)
SERUM IFX INTERP: NORMAL
SODIUM BLD-SCNC: 134 MMOL/L (ref 135–144)
SODIUM BLD-SCNC: 135 MMOL/L (ref 135–144)
SODIUM BLD-SCNC: 135 MMOL/L (ref 135–144)
SODIUM BLD-SCNC: 136 MMOL/L (ref 135–144)
SODIUM BLD-SCNC: 136 MMOL/L (ref 135–144)
SODIUM BLD-SCNC: 137 MMOL/L (ref 135–144)
SODIUM BLD-SCNC: 138 MMOL/L (ref 135–144)
SODIUM BLD-SCNC: 139 MMOL/L (ref 135–144)
SODIUM BLD-SCNC: 140 MMOL/L (ref 135–144)
SODIUM BLD-SCNC: 142 MMOL/L (ref 135–144)
SODIUM BLD-SCNC: 142 MMOL/L (ref 135–144)
SODIUM BLD-SCNC: 145 MMOL/L (ref 135–144)
SODIUM BLD-SCNC: 146 MMOL/L (ref 135–144)
SODIUM BLD-SCNC: 149 MMOL/L (ref 135–144)
SODIUM,UR: 32 MMOL/L
SODIUM,UR: 38 MMOL/L
SPECIFIC GRAVITY UA: 1.01 (ref 1–1.03)
SPECIFIC GRAVITY UA: 1.02 (ref 1–1.03)
SPECIMEN DESCRIPTION: ABNORMAL
SPECIMEN DESCRIPTION: NORMAL
TCO2 (CALC), ART: 26 MMOL/L (ref 22–29)
TCO2 (CALC), ART: 27 MMOL/L (ref 22–29)
TCO2 (CALC), ART: 27 MMOL/L (ref 22–29)
TCO2 (CALC), ART: 28 MMOL/L (ref 22–29)
TCO2 (CALC), ART: 29 MMOL/L (ref 22–29)
TCO2 (CALC), ART: 30 MMOL/L (ref 22–29)
TCO2 (CALC), ART: 31 MMOL/L (ref 22–29)
TCO2 (CALC), ART: 31 MMOL/L (ref 22–29)
TCO2 (CALC), ART: 32 MMOL/L (ref 22–29)
TCO2 (CALC), ART: 39 MMOL/L (ref 22–29)
TCO2 (CALC), ART: 41 MMOL/L (ref 22–29)
TCO2 (CALC), ART: 43 MMOL/L (ref 22–29)
TCO2 (CALC), ART: 43 MMOL/L (ref 22–29)
TCO2 (CALC), ART: 44 MMOL/L (ref 22–29)
TCO2 (CALC), ART: 45 MMOL/L (ref 22–29)
TCO2 (CALC), ART: 46 MMOL/L (ref 22–29)
TCO2 (CALC), ART: 46 MMOL/L (ref 22–29)
TCO2 (CALC), ART: 47 MMOL/L (ref 22–29)
TCO2 (CALC), ART: 49 MMOL/L (ref 22–29)
TCO2 (CALC), ART: >50 MMOL/L (ref 22–29)
TCO2 (CALC), ART: >50 MMOL/L (ref 22–29)
TOTAL PROTEIN, URINE: 70 MG/DL
TOTAL PROTEIN: 5 G/DL (ref 6.4–8.3)
TRICHOMONAS: ABNORMAL
TRICHOMONAS: NORMAL
TROPONIN INTERP: ABNORMAL
TROPONIN T: ABNORMAL NG/ML
TROPONIN, HIGH SENSITIVITY: 109 NG/L (ref 0–14)
TROPONIN, HIGH SENSITIVITY: 113 NG/L (ref 0–14)
TROPONIN, HIGH SENSITIVITY: 120 NG/L (ref 0–14)
TROPONIN, HIGH SENSITIVITY: 131 NG/L (ref 0–14)
TROPONIN, HIGH SENSITIVITY: 139 NG/L (ref 0–14)
TROPONIN, HIGH SENSITIVITY: 71 NG/L (ref 0–14)
TROPONIN, HIGH SENSITIVITY: 81 NG/L (ref 0–14)
TROPONIN, HIGH SENSITIVITY: 98 NG/L (ref 0–14)
TURBIDITY: ABNORMAL
TURBIDITY: ABNORMAL
UREA NITROGEN, UR: 229 MG/DL
URINE HGB: ABNORMAL
URINE HGB: ABNORMAL
URINE TOTAL PROTEIN CREATININE RATIO: 0.75 (ref 0–0.2)
UROBILINOGEN, URINE: NORMAL
UROBILINOGEN, URINE: NORMAL
WBC # BLD: 11.3 K/UL (ref 3.5–11.3)
WBC # BLD: 11.3 K/UL (ref 3.5–11.3)
WBC # BLD: 11.5 K/UL (ref 3.5–11.3)
WBC # BLD: 12.4 K/UL (ref 3.5–11.3)
WBC # BLD: 13 K/UL (ref 3.5–11.3)
WBC # BLD: 13.5 K/UL (ref 3.5–11.3)
WBC # BLD: 14 K/UL (ref 3.5–11.3)
WBC # BLD: 14.4 K/UL (ref 3.5–11.3)
WBC # BLD: 15.2 K/UL (ref 3.5–11.3)
WBC # BLD: 16.5 K/UL (ref 3.5–11.3)
WBC # BLD: 16.9 K/UL (ref 3.5–11.3)
WBC # BLD: 18.1 K/UL (ref 3.5–11.3)
WBC # BLD: 19.8 K/UL (ref 3.5–11.3)
WBC # BLD: 23.9 K/UL (ref 3.5–11.3)
WBC # BLD: 4.3 K/UL (ref 3.5–11.3)
WBC # BLD: 6.4 K/UL (ref 3.5–11.3)
WBC # BLD: 6.9 K/UL (ref 3.5–11.3)
WBC # BLD: 8.8 K/UL (ref 3.5–11.3)
WBC # BLD: ABNORMAL 10*3/UL
WBC UA: ABNORMAL /HPF (ref 0–5)
WBC UA: NORMAL /HPF (ref 0–5)
YEAST: ABNORMAL
YEAST: NORMAL
ZZ NTE CLEAN UP: ORDERED TEST: NORMAL
ZZ NTE WITH NAME CLEAN UP: SPECIMEN SOURCE: NORMAL

## 2020-01-01 PROCEDURE — 85730 THROMBOPLASTIN TIME PARTIAL: CPT

## 2020-01-01 PROCEDURE — 6360000002 HC RX W HCPCS: Performed by: STUDENT IN AN ORGANIZED HEALTH CARE EDUCATION/TRAINING PROGRAM

## 2020-01-01 PROCEDURE — 94770 HC ETCO2 MONITOR DAILY: CPT

## 2020-01-01 PROCEDURE — 2580000003 HC RX 258: Performed by: STUDENT IN AN ORGANIZED HEALTH CARE EDUCATION/TRAINING PROGRAM

## 2020-01-01 PROCEDURE — 6360000002 HC RX W HCPCS: Performed by: INTERNAL MEDICINE

## 2020-01-01 PROCEDURE — 6370000000 HC RX 637 (ALT 250 FOR IP): Performed by: STUDENT IN AN ORGANIZED HEALTH CARE EDUCATION/TRAINING PROGRAM

## 2020-01-01 PROCEDURE — C9113 INJ PANTOPRAZOLE SODIUM, VIA: HCPCS | Performed by: STUDENT IN AN ORGANIZED HEALTH CARE EDUCATION/TRAINING PROGRAM

## 2020-01-01 PROCEDURE — 2500000003 HC RX 250 WO HCPCS: Performed by: STUDENT IN AN ORGANIZED HEALTH CARE EDUCATION/TRAINING PROGRAM

## 2020-01-01 PROCEDURE — 89220 SPUTUM SPECIMEN COLLECTION: CPT

## 2020-01-01 PROCEDURE — 93308 TTE F-UP OR LMTD: CPT

## 2020-01-01 PROCEDURE — 82803 BLOOD GASES ANY COMBINATION: CPT

## 2020-01-01 PROCEDURE — 80048 BASIC METABOLIC PNL TOTAL CA: CPT

## 2020-01-01 PROCEDURE — 94640 AIRWAY INHALATION TREATMENT: CPT

## 2020-01-01 PROCEDURE — 6370000000 HC RX 637 (ALT 250 FOR IP): Performed by: INTERNAL MEDICINE

## 2020-01-01 PROCEDURE — 2580000003 HC RX 258: Performed by: NURSE PRACTITIONER

## 2020-01-01 PROCEDURE — 37799 UNLISTED PX VASCULAR SURGERY: CPT

## 2020-01-01 PROCEDURE — 99291 CRITICAL CARE FIRST HOUR: CPT | Performed by: INTERNAL MEDICINE

## 2020-01-01 PROCEDURE — 85055 RETICULATED PLATELET ASSAY: CPT

## 2020-01-01 PROCEDURE — 84132 ASSAY OF SERUM POTASSIUM: CPT

## 2020-01-01 PROCEDURE — 94003 VENT MGMT INPAT SUBQ DAY: CPT

## 2020-01-01 PROCEDURE — 85025 COMPLETE CBC W/AUTO DIFF WBC: CPT

## 2020-01-01 PROCEDURE — 93010 ELECTROCARDIOGRAM REPORT: CPT | Performed by: INTERNAL MEDICINE

## 2020-01-01 PROCEDURE — 2000000000 HC ICU R&B

## 2020-01-01 PROCEDURE — 2500000003 HC RX 250 WO HCPCS: Performed by: INTERNAL MEDICINE

## 2020-01-01 PROCEDURE — 94761 N-INVAS EAR/PLS OXIMETRY MLT: CPT

## 2020-01-01 PROCEDURE — 87015 SPECIMEN INFECT AGNT CONCNTJ: CPT

## 2020-01-01 PROCEDURE — 94645 CONT INHLJ TX EACH ADDL HOUR: CPT

## 2020-01-01 PROCEDURE — 87106 FUNGI IDENTIFICATION YEAST: CPT

## 2020-01-01 PROCEDURE — 36415 COLL VENOUS BLD VENIPUNCTURE: CPT

## 2020-01-01 PROCEDURE — 2700000000 HC OXYGEN THERAPY PER DAY

## 2020-01-01 PROCEDURE — 87205 SMEAR GRAM STAIN: CPT

## 2020-01-01 PROCEDURE — 84300 ASSAY OF URINE SODIUM: CPT

## 2020-01-01 PROCEDURE — P9047 ALBUMIN (HUMAN), 25%, 50ML: HCPCS | Performed by: INTERNAL MEDICINE

## 2020-01-01 PROCEDURE — 51798 US URINE CAPACITY MEASURE: CPT

## 2020-01-01 PROCEDURE — 70450 CT HEAD/BRAIN W/O DYE: CPT

## 2020-01-01 PROCEDURE — 80074 ACUTE HEPATITIS PANEL: CPT

## 2020-01-01 PROCEDURE — 94002 VENT MGMT INPAT INIT DAY: CPT

## 2020-01-01 PROCEDURE — 51701 INSERT BLADDER CATHETER: CPT

## 2020-01-01 PROCEDURE — 83516 IMMUNOASSAY NONANTIBODY: CPT

## 2020-01-01 PROCEDURE — 82570 ASSAY OF URINE CREATININE: CPT

## 2020-01-01 PROCEDURE — 83880 ASSAY OF NATRIURETIC PEPTIDE: CPT

## 2020-01-01 PROCEDURE — 94644 CONT INHLJ TX 1ST HOUR: CPT

## 2020-01-01 PROCEDURE — 82947 ASSAY GLUCOSE BLOOD QUANT: CPT

## 2020-01-01 PROCEDURE — 80053 COMPREHEN METABOLIC PANEL: CPT

## 2020-01-01 PROCEDURE — 99497 ADVNCD CARE PLAN 30 MIN: CPT | Performed by: FAMILY MEDICINE

## 2020-01-01 PROCEDURE — 36600 WITHDRAWAL OF ARTERIAL BLOOD: CPT

## 2020-01-01 PROCEDURE — 85027 COMPLETE CBC AUTOMATED: CPT

## 2020-01-01 PROCEDURE — 06HM33Z INSERTION OF INFUSION DEVICE INTO RIGHT FEMORAL VEIN, PERCUTANEOUS APPROACH: ICD-10-PCS | Performed by: INTERNAL MEDICINE

## 2020-01-01 PROCEDURE — 2580000003 HC RX 258: Performed by: INTERNAL MEDICINE

## 2020-01-01 PROCEDURE — 87040 BLOOD CULTURE FOR BACTERIA: CPT

## 2020-01-01 PROCEDURE — 83735 ASSAY OF MAGNESIUM: CPT

## 2020-01-01 PROCEDURE — 93005 ELECTROCARDIOGRAM TRACING: CPT | Performed by: SPECIALIST

## 2020-01-01 PROCEDURE — 71045 X-RAY EXAM CHEST 1 VIEW: CPT

## 2020-01-01 PROCEDURE — 93005 ELECTROCARDIOGRAM TRACING: CPT | Performed by: INTERNAL MEDICINE

## 2020-01-01 PROCEDURE — 81001 URINALYSIS AUTO W/SCOPE: CPT

## 2020-01-01 PROCEDURE — 6360000002 HC RX W HCPCS

## 2020-01-01 PROCEDURE — 97110 THERAPEUTIC EXERCISES: CPT

## 2020-01-01 PROCEDURE — 84484 ASSAY OF TROPONIN QUANT: CPT

## 2020-01-01 PROCEDURE — 76937 US GUIDE VASCULAR ACCESS: CPT

## 2020-01-01 PROCEDURE — 87102 FUNGUS ISOLATION CULTURE: CPT

## 2020-01-01 PROCEDURE — 83605 ASSAY OF LACTIC ACID: CPT

## 2020-01-01 PROCEDURE — 84156 ASSAY OF PROTEIN URINE: CPT

## 2020-01-01 PROCEDURE — 83874 ASSAY OF MYOGLOBIN: CPT

## 2020-01-01 PROCEDURE — 2580000003 HC RX 258: Performed by: EMERGENCY MEDICINE

## 2020-01-01 PROCEDURE — 87140 CULTURE TYPE IMMUNOFLUORESC: CPT

## 2020-01-01 PROCEDURE — 76770 US EXAM ABDO BACK WALL COMP: CPT

## 2020-01-01 PROCEDURE — 87252 VIRUS INOCULATION TISSUE: CPT

## 2020-01-01 PROCEDURE — 87529 HSV DNA AMP PROBE: CPT

## 2020-01-01 PROCEDURE — 99232 SBSQ HOSP IP/OBS MODERATE 35: CPT | Performed by: FAMILY MEDICINE

## 2020-01-01 PROCEDURE — 86038 ANTINUCLEAR ANTIBODIES: CPT

## 2020-01-01 PROCEDURE — 87641 MR-STAPH DNA AMP PROBE: CPT

## 2020-01-01 PROCEDURE — 6370000000 HC RX 637 (ALT 250 FOR IP): Performed by: OPHTHALMOLOGY

## 2020-01-01 PROCEDURE — 5A1955Z RESPIRATORY VENTILATION, GREATER THAN 96 CONSECUTIVE HOURS: ICD-10-PCS | Performed by: INTERNAL MEDICINE

## 2020-01-01 PROCEDURE — 93325 DOPPLER ECHO COLOR FLOW MAPG: CPT

## 2020-01-01 PROCEDURE — 93005 ELECTROCARDIOGRAM TRACING: CPT | Performed by: STUDENT IN AN ORGANIZED HEALTH CARE EDUCATION/TRAINING PROGRAM

## 2020-01-01 PROCEDURE — 86334 IMMUNOFIX E-PHORESIS SERUM: CPT

## 2020-01-01 PROCEDURE — 94669 MECHANICAL CHEST WALL OSCILL: CPT

## 2020-01-01 PROCEDURE — 99213 OFFICE O/P EST LOW 20 MIN: CPT

## 2020-01-01 PROCEDURE — 87300 AG DETECTION POLYVAL IF: CPT

## 2020-01-01 PROCEDURE — 36620 INSERTION CATHETER ARTERY: CPT

## 2020-01-01 PROCEDURE — 99222 1ST HOSP IP/OBS MODERATE 55: CPT | Performed by: FAMILY MEDICINE

## 2020-01-01 PROCEDURE — 87086 URINE CULTURE/COLONY COUNT: CPT

## 2020-01-01 PROCEDURE — 84540 ASSAY OF URINE/UREA-N: CPT

## 2020-01-01 PROCEDURE — 06PYX3Z REMOVAL OF INFUSION DEVICE FROM LOWER VEIN, EXTERNAL APPROACH: ICD-10-PCS | Performed by: INTERNAL MEDICINE

## 2020-01-01 PROCEDURE — 93306 TTE W/DOPPLER COMPLETE: CPT

## 2020-01-01 PROCEDURE — 83883 ASSAY NEPHELOMETRY NOT SPEC: CPT

## 2020-01-01 PROCEDURE — 86160 COMPLEMENT ANTIGEN: CPT

## 2020-01-01 PROCEDURE — 83036 HEMOGLOBIN GLYCOSYLATED A1C: CPT

## 2020-01-01 PROCEDURE — 97161 PT EVAL LOW COMPLEX 20 MIN: CPT

## 2020-01-01 PROCEDURE — 80051 ELECTROLYTE PANEL: CPT

## 2020-01-01 RX ORDER — ALBUTEROL SULFATE 2.5 MG/3ML
2.5 SOLUTION RESPIRATORY (INHALATION) 2 TIMES DAILY
Status: DISCONTINUED | OUTPATIENT
Start: 2020-01-01 | End: 2020-01-01

## 2020-01-01 RX ORDER — POTASSIUM CHLORIDE 29.8 MG/ML
20 INJECTION INTRAVENOUS ONCE
Status: COMPLETED | OUTPATIENT
Start: 2020-01-01 | End: 2020-01-01

## 2020-01-01 RX ORDER — VANCOMYCIN HYDROCHLORIDE 1 G/200ML
1000 INJECTION, SOLUTION INTRAVENOUS ONCE
Status: DISCONTINUED | OUTPATIENT
Start: 2020-01-01 | End: 2020-01-01

## 2020-01-01 RX ORDER — METOPROLOL TARTRATE 5 MG/5ML
5 INJECTION INTRAVENOUS EVERY 4 HOURS PRN
Status: DISCONTINUED | OUTPATIENT
Start: 2020-01-01 | End: 2020-01-01

## 2020-01-01 RX ORDER — METOPROLOL SUCCINATE 50 MG/1
50 TABLET, EXTENDED RELEASE ORAL DAILY
Status: DISCONTINUED | OUTPATIENT
Start: 2020-01-01 | End: 2020-01-01

## 2020-01-01 RX ORDER — GLYCOPYRROLATE 0.2 MG/ML
0.2 INJECTION INTRAMUSCULAR; INTRAVENOUS EVERY 4 HOURS PRN
Status: DISCONTINUED | OUTPATIENT
Start: 2020-01-01 | End: 2020-01-01 | Stop reason: HOSPADM

## 2020-01-01 RX ORDER — ALBUTEROL SULFATE 2.5 MG/3ML
15 SOLUTION RESPIRATORY (INHALATION) CONTINUOUS
Status: DISCONTINUED | OUTPATIENT
Start: 2020-01-01 | End: 2020-01-01

## 2020-01-01 RX ORDER — PROPOFOL 10 MG/ML
10 INJECTION, EMULSION INTRAVENOUS
Status: DISCONTINUED | OUTPATIENT
Start: 2020-01-01 | End: 2020-01-01

## 2020-01-01 RX ORDER — MIDODRINE HYDROCHLORIDE 5 MG/1
5 TABLET ORAL PRN
Status: DISCONTINUED | OUTPATIENT
Start: 2020-01-01 | End: 2020-01-01

## 2020-01-01 RX ORDER — BACITRACIN ZINC AND POLYMYXIN B SULFATE 500; 10000 [USP'U]/G; [USP'U]/G
OINTMENT OPHTHALMIC 4 TIMES DAILY
Status: DISCONTINUED | OUTPATIENT
Start: 2020-01-01 | End: 2020-01-01

## 2020-01-01 RX ORDER — METHYLPREDNISOLONE SODIUM SUCCINATE 40 MG/ML
40 INJECTION, POWDER, LYOPHILIZED, FOR SOLUTION INTRAMUSCULAR; INTRAVENOUS EVERY 12 HOURS
Status: DISCONTINUED | OUTPATIENT
Start: 2020-01-01 | End: 2020-01-01

## 2020-01-01 RX ORDER — 0.9 % SODIUM CHLORIDE 0.9 %
500 INTRAVENOUS SOLUTION INTRAVENOUS ONCE
Status: COMPLETED | OUTPATIENT
Start: 2020-01-01 | End: 2020-01-01

## 2020-01-01 RX ORDER — DEXTROSE MONOHYDRATE 25 G/50ML
12.5 INJECTION, SOLUTION INTRAVENOUS PRN
Status: DISCONTINUED | OUTPATIENT
Start: 2020-01-01 | End: 2020-01-01

## 2020-01-01 RX ORDER — ALBUTEROL SULFATE 90 UG/1
2 AEROSOL, METERED RESPIRATORY (INHALATION) EVERY 4 HOURS PRN
COMMUNITY

## 2020-01-01 RX ORDER — HEPARIN SODIUM 10000 [USP'U]/100ML
18 INJECTION, SOLUTION INTRAVENOUS CONTINUOUS
Status: DISCONTINUED | OUTPATIENT
Start: 2020-01-01 | End: 2020-01-01

## 2020-01-01 RX ORDER — IPRATROPIUM BROMIDE AND ALBUTEROL SULFATE 2.5; .5 MG/3ML; MG/3ML
1 SOLUTION RESPIRATORY (INHALATION)
Status: DISCONTINUED | OUTPATIENT
Start: 2020-01-01 | End: 2020-01-01

## 2020-01-01 RX ORDER — OSELTAMIVIR PHOSPHATE 6 MG/ML
30 FOR SUSPENSION ORAL DAILY
Status: DISCONTINUED | OUTPATIENT
Start: 2020-01-01 | End: 2020-01-01

## 2020-01-01 RX ORDER — LABETALOL HYDROCHLORIDE 5 MG/ML
10 INJECTION, SOLUTION INTRAVENOUS EVERY 4 HOURS PRN
Status: DISCONTINUED | OUTPATIENT
Start: 2020-01-01 | End: 2020-01-01

## 2020-01-01 RX ORDER — PREDNISONE 10 MG/1
10 TABLET ORAL SEE ADMIN INSTRUCTIONS
COMMUNITY

## 2020-01-01 RX ORDER — METHYLPREDNISOLONE SODIUM SUCCINATE 40 MG/ML
40 INJECTION, POWDER, LYOPHILIZED, FOR SOLUTION INTRAMUSCULAR; INTRAVENOUS DAILY
Status: DISCONTINUED | OUTPATIENT
Start: 2020-01-01 | End: 2020-01-01

## 2020-01-01 RX ORDER — HEPARIN SODIUM 5000 [USP'U]/ML
5000 INJECTION, SOLUTION INTRAVENOUS; SUBCUTANEOUS EVERY 8 HOURS SCHEDULED
Status: DISCONTINUED | OUTPATIENT
Start: 2020-01-01 | End: 2020-01-01

## 2020-01-01 RX ORDER — MAGNESIUM SULFATE 1 G/100ML
1 INJECTION INTRAVENOUS ONCE
Status: DISCONTINUED | OUTPATIENT
Start: 2020-01-01 | End: 2020-01-01

## 2020-01-01 RX ORDER — ADENOSINE 3 MG/ML
INJECTION, SOLUTION INTRAVENOUS
Status: DISPENSED
Start: 2020-01-01 | End: 2020-01-01

## 2020-01-01 RX ORDER — PREDNISONE 10 MG/1
10 TABLET ORAL DAILY
Status: DISCONTINUED | OUTPATIENT
Start: 2020-03-19 | End: 2020-01-01

## 2020-01-01 RX ORDER — SODIUM CHLORIDE 0.9 % (FLUSH) 0.9 %
10 SYRINGE (ML) INJECTION PRN
Status: DISCONTINUED | OUTPATIENT
Start: 2020-01-01 | End: 2020-01-01

## 2020-01-01 RX ORDER — ACETAMINOPHEN 650 MG/1
650 SUPPOSITORY RECTAL EVERY 6 HOURS PRN
Status: DISCONTINUED | OUTPATIENT
Start: 2020-01-01 | End: 2020-01-01

## 2020-01-01 RX ORDER — MIDODRINE HYDROCHLORIDE 5 MG/1
5 TABLET ORAL
Status: DISCONTINUED | OUTPATIENT
Start: 2020-01-01 | End: 2020-01-01

## 2020-01-01 RX ORDER — INSULIN GLARGINE 100 [IU]/ML
20 INJECTION, SOLUTION SUBCUTANEOUS NIGHTLY
Status: DISCONTINUED | OUTPATIENT
Start: 2020-01-01 | End: 2020-01-01

## 2020-01-01 RX ORDER — HEPARIN SODIUM 1000 [USP'U]/ML
60 INJECTION, SOLUTION INTRAVENOUS; SUBCUTANEOUS PRN
Status: DISCONTINUED | OUTPATIENT
Start: 2020-01-01 | End: 2020-01-01

## 2020-01-01 RX ORDER — LORAZEPAM 2 MG/ML
1 INJECTION INTRAMUSCULAR EVERY 6 HOURS PRN
Status: DISCONTINUED | OUTPATIENT
Start: 2020-01-01 | End: 2020-01-01 | Stop reason: HOSPADM

## 2020-01-01 RX ORDER — BUMETANIDE 0.25 MG/ML
2 INJECTION, SOLUTION INTRAMUSCULAR; INTRAVENOUS 3 TIMES DAILY
Status: DISCONTINUED | OUTPATIENT
Start: 2020-01-01 | End: 2020-01-01

## 2020-01-01 RX ORDER — 0.9 % SODIUM CHLORIDE 0.9 %
250 INTRAVENOUS SOLUTION INTRAVENOUS ONCE
Status: COMPLETED | OUTPATIENT
Start: 2020-01-01 | End: 2020-01-01

## 2020-01-01 RX ORDER — WARFARIN SODIUM 5 MG/1
5 TABLET ORAL DAILY
COMMUNITY

## 2020-01-01 RX ORDER — ACETAMINOPHEN 325 MG/1
650 TABLET ORAL EVERY 6 HOURS PRN
Status: DISCONTINUED | OUTPATIENT
Start: 2020-01-01 | End: 2020-01-01

## 2020-01-01 RX ORDER — MORPHINE SULFATE 4 MG/ML
4 INJECTION, SOLUTION INTRAMUSCULAR; INTRAVENOUS
Status: DISCONTINUED | OUTPATIENT
Start: 2020-01-01 | End: 2020-01-01 | Stop reason: HOSPADM

## 2020-01-01 RX ORDER — SODIUM CHLORIDE 0.9 % (FLUSH) 0.9 %
10 SYRINGE (ML) INJECTION EVERY 12 HOURS SCHEDULED
Status: DISCONTINUED | OUTPATIENT
Start: 2020-01-01 | End: 2020-01-01

## 2020-01-01 RX ORDER — PROMETHAZINE HYDROCHLORIDE 25 MG/1
12.5 TABLET ORAL EVERY 6 HOURS PRN
Status: DISCONTINUED | OUTPATIENT
Start: 2020-01-01 | End: 2020-01-01 | Stop reason: HOSPADM

## 2020-01-01 RX ORDER — FENTANYL CITRATE 50 UG/ML
25 INJECTION, SOLUTION INTRAMUSCULAR; INTRAVENOUS
Status: DISCONTINUED | OUTPATIENT
Start: 2020-01-01 | End: 2020-01-01

## 2020-01-01 RX ORDER — PROPOFOL 10 MG/ML
INJECTION, EMULSION INTRAVENOUS
Status: COMPLETED
Start: 2020-01-01 | End: 2020-01-01

## 2020-01-01 RX ORDER — MAGNESIUM SULFATE 1 G/100ML
1 INJECTION INTRAVENOUS ONCE
Status: COMPLETED | OUTPATIENT
Start: 2020-01-01 | End: 2020-01-01

## 2020-01-01 RX ORDER — INSULIN GLARGINE 100 [IU]/ML
10 INJECTION, SOLUTION SUBCUTANEOUS ONCE
Status: COMPLETED | OUTPATIENT
Start: 2020-01-01 | End: 2020-01-01

## 2020-01-01 RX ORDER — BUDESONIDE 0.25 MG/2ML
1 INHALANT ORAL 2 TIMES DAILY
COMMUNITY

## 2020-01-01 RX ORDER — AZITHROMYCIN 250 MG/1
500 TABLET, FILM COATED ORAL DAILY
COMMUNITY

## 2020-01-01 RX ORDER — POTASSIUM CHLORIDE 29.8 MG/ML
20 INJECTION INTRAVENOUS ONCE
Status: DISCONTINUED | OUTPATIENT
Start: 2020-01-01 | End: 2020-01-01

## 2020-01-01 RX ORDER — PREDNISONE 20 MG/1
20 TABLET ORAL DAILY
Status: DISCONTINUED | OUTPATIENT
Start: 2020-03-16 | End: 2020-01-01

## 2020-01-01 RX ORDER — ALBUTEROL SULFATE 2.5 MG/3ML
15 SOLUTION RESPIRATORY (INHALATION) ONCE
Status: DISCONTINUED | OUTPATIENT
Start: 2020-01-01 | End: 2020-01-01

## 2020-01-01 RX ORDER — HEPARIN SODIUM 10000 [USP'U]/100ML
12 INJECTION, SOLUTION INTRAVENOUS CONTINUOUS
Status: DISCONTINUED | OUTPATIENT
Start: 2020-01-01 | End: 2020-01-01

## 2020-01-01 RX ORDER — SODIUM CHLORIDE 9 MG/ML
INJECTION, SOLUTION INTRAVENOUS CONTINUOUS
Status: DISCONTINUED | OUTPATIENT
Start: 2020-01-01 | End: 2020-01-01

## 2020-01-01 RX ORDER — 0.9 % SODIUM CHLORIDE 0.9 %
1000 INTRAVENOUS SOLUTION INTRAVENOUS ONCE
Status: COMPLETED | OUTPATIENT
Start: 2020-01-01 | End: 2020-01-01

## 2020-01-01 RX ORDER — HEPARIN SODIUM 1000 [USP'U]/ML
80 INJECTION, SOLUTION INTRAVENOUS; SUBCUTANEOUS PRN
Status: DISCONTINUED | OUTPATIENT
Start: 2020-01-01 | End: 2020-01-01

## 2020-01-01 RX ORDER — ONDANSETRON 2 MG/ML
4 INJECTION INTRAMUSCULAR; INTRAVENOUS EVERY 6 HOURS PRN
Status: DISCONTINUED | OUTPATIENT
Start: 2020-01-01 | End: 2020-01-01

## 2020-01-01 RX ORDER — IPRATROPIUM BROMIDE AND ALBUTEROL SULFATE 2.5; .5 MG/3ML; MG/3ML
1 SOLUTION RESPIRATORY (INHALATION) CONTINUOUS
Status: DISCONTINUED | OUTPATIENT
Start: 2020-01-01 | End: 2020-01-01

## 2020-01-01 RX ORDER — SENNOSIDES 8.8 MG/5ML
5 LIQUID ORAL 2 TIMES DAILY PRN
Status: DISCONTINUED | OUTPATIENT
Start: 2020-01-01 | End: 2020-01-01 | Stop reason: HOSPADM

## 2020-01-01 RX ORDER — CISATRACURIUM BESYLATE 10 MG/ML
0.15 INJECTION, SOLUTION INTRAVENOUS ONCE
Status: COMPLETED | OUTPATIENT
Start: 2020-01-01 | End: 2020-01-01

## 2020-01-01 RX ORDER — NICOTINE POLACRILEX 4 MG
15 LOZENGE BUCCAL PRN
Status: DISCONTINUED | OUTPATIENT
Start: 2020-01-01 | End: 2020-01-01

## 2020-01-01 RX ORDER — OSELTAMIVIR PHOSPHATE 75 MG/1
75 CAPSULE ORAL 2 TIMES DAILY
Status: DISCONTINUED | OUTPATIENT
Start: 2020-01-01 | End: 2020-01-01

## 2020-01-01 RX ORDER — INSULIN GLARGINE 100 [IU]/ML
10 INJECTION, SOLUTION SUBCUTANEOUS NIGHTLY
Status: DISCONTINUED | OUTPATIENT
Start: 2020-01-01 | End: 2020-01-01

## 2020-01-01 RX ORDER — HYDROCORTISONE 10 MG/1
30 TABLET ORAL
COMMUNITY

## 2020-01-01 RX ORDER — SODIUM CHLORIDE 9 MG/ML
10 INJECTION INTRAVENOUS DAILY
Status: DISCONTINUED | OUTPATIENT
Start: 2020-01-01 | End: 2020-01-01

## 2020-01-01 RX ORDER — LANSOPRAZOLE
30 KIT DAILY
Status: DISCONTINUED | OUTPATIENT
Start: 2020-01-01 | End: 2020-01-01

## 2020-01-01 RX ORDER — HEPARIN SODIUM 1000 [USP'U]/ML
80 INJECTION, SOLUTION INTRAVENOUS; SUBCUTANEOUS ONCE
Status: COMPLETED | OUTPATIENT
Start: 2020-01-01 | End: 2020-01-01

## 2020-01-01 RX ORDER — ERYTHROMYCIN 5 MG/G
OINTMENT OPHTHALMIC 4 TIMES DAILY
Status: DISCONTINUED | OUTPATIENT
Start: 2020-01-01 | End: 2020-01-01

## 2020-01-01 RX ORDER — ASPIRIN 81 MG/1
81 TABLET, CHEWABLE ORAL DAILY
Status: DISCONTINUED | OUTPATIENT
Start: 2020-01-01 | End: 2020-01-01

## 2020-01-01 RX ORDER — PANTOPRAZOLE SODIUM 40 MG/10ML
40 INJECTION, POWDER, LYOPHILIZED, FOR SOLUTION INTRAVENOUS DAILY
Status: DISCONTINUED | OUTPATIENT
Start: 2020-01-01 | End: 2020-01-01

## 2020-01-01 RX ORDER — OSELTAMIVIR PHOSPHATE 6 MG/ML
30 FOR SUSPENSION ORAL 2 TIMES DAILY
Status: DISCONTINUED | OUTPATIENT
Start: 2020-01-01 | End: 2020-01-01

## 2020-01-01 RX ORDER — ATORVASTATIN CALCIUM 40 MG/1
40 TABLET, FILM COATED ORAL NIGHTLY
Status: DISCONTINUED | OUTPATIENT
Start: 2020-01-01 | End: 2020-01-01

## 2020-01-01 RX ORDER — OSELTAMIVIR PHOSPHATE 6 MG/ML
75 FOR SUSPENSION ORAL 2 TIMES DAILY
Status: DISCONTINUED | OUTPATIENT
Start: 2020-01-01 | End: 2020-01-01 | Stop reason: DRUGHIGH

## 2020-01-01 RX ORDER — ALBUTEROL SULFATE 2.5 MG/3ML
15 SOLUTION RESPIRATORY (INHALATION) CONTINUOUS
Status: DISPENSED | OUTPATIENT
Start: 2020-01-01 | End: 2020-01-01

## 2020-01-01 RX ORDER — IPRATROPIUM BROMIDE AND ALBUTEROL SULFATE 2.5; .5 MG/3ML; MG/3ML
1 SOLUTION RESPIRATORY (INHALATION) 4 TIMES DAILY
Status: DISCONTINUED | OUTPATIENT
Start: 2020-01-01 | End: 2020-01-01

## 2020-01-01 RX ORDER — PREDNISONE 20 MG/1
40 TABLET ORAL DAILY
Status: COMPLETED | OUTPATIENT
Start: 2020-01-01 | End: 2020-01-01

## 2020-01-01 RX ORDER — POLYMYXIN B SULFATE AND TRIMETHOPRIM 1; 10000 MG/ML; [USP'U]/ML
1 SOLUTION OPHTHALMIC
Status: DISCONTINUED | OUTPATIENT
Start: 2020-01-01 | End: 2020-01-01

## 2020-01-01 RX ORDER — ALBUMIN (HUMAN) 12.5 G/50ML
25 SOLUTION INTRAVENOUS EVERY 8 HOURS
Status: COMPLETED | OUTPATIENT
Start: 2020-01-01 | End: 2020-01-01

## 2020-01-01 RX ORDER — DEXTROSE MONOHYDRATE 50 MG/ML
100 INJECTION, SOLUTION INTRAVENOUS PRN
Status: DISCONTINUED | OUTPATIENT
Start: 2020-01-01 | End: 2020-01-01

## 2020-01-01 RX ORDER — ONDANSETRON 2 MG/ML
4 INJECTION INTRAMUSCULAR; INTRAVENOUS EVERY 6 HOURS PRN
Status: DISCONTINUED | OUTPATIENT
Start: 2020-01-01 | End: 2020-01-01 | Stop reason: SDUPTHER

## 2020-01-01 RX ORDER — FENTANYL CITRATE 50 UG/ML
50 INJECTION, SOLUTION INTRAMUSCULAR; INTRAVENOUS
Status: DISCONTINUED | OUTPATIENT
Start: 2020-01-01 | End: 2020-01-01

## 2020-01-01 RX ORDER — ALBUTEROL SULFATE 2.5 MG/3ML
2.5 SOLUTION RESPIRATORY (INHALATION) CONTINUOUS
Status: DISCONTINUED | OUTPATIENT
Start: 2020-01-01 | End: 2020-01-01

## 2020-01-01 RX ORDER — HEPARIN SODIUM 1000 [USP'U]/ML
60 INJECTION, SOLUTION INTRAVENOUS; SUBCUTANEOUS ONCE
Status: COMPLETED | OUTPATIENT
Start: 2020-01-01 | End: 2020-01-01

## 2020-01-01 RX ORDER — TETRAHYDROZOLINE HCL 0.05 %
1 DROPS OPHTHALMIC (EYE) 3 TIMES DAILY
Status: DISCONTINUED | OUTPATIENT
Start: 2020-01-01 | End: 2020-01-01

## 2020-01-01 RX ORDER — HEPARIN SODIUM 1000 [USP'U]/ML
30 INJECTION, SOLUTION INTRAVENOUS; SUBCUTANEOUS PRN
Status: DISCONTINUED | OUTPATIENT
Start: 2020-01-01 | End: 2020-01-01

## 2020-01-01 RX ORDER — MAGNESIUM SULFATE 1 G/100ML
2 INJECTION INTRAVENOUS ONCE
Status: COMPLETED | OUTPATIENT
Start: 2020-01-01 | End: 2020-01-01

## 2020-01-01 RX ORDER — DILTIAZEM HYDROCHLORIDE 5 MG/ML
10 INJECTION INTRAVENOUS ONCE
Status: COMPLETED | OUTPATIENT
Start: 2020-01-01 | End: 2020-01-01

## 2020-01-01 RX ORDER — PREDNISONE 20 MG/1
40 TABLET ORAL DAILY
Status: DISCONTINUED | OUTPATIENT
Start: 2020-01-01 | End: 2020-01-01

## 2020-01-01 RX ORDER — POTASSIUM CHLORIDE 7.45 MG/ML
10 INJECTION INTRAVENOUS PRN
Status: DISCONTINUED | OUTPATIENT
Start: 2020-01-01 | End: 2020-01-01

## 2020-01-01 RX ORDER — MORPHINE SULFATE 2 MG/ML
2 INJECTION, SOLUTION INTRAMUSCULAR; INTRAVENOUS
Status: DISCONTINUED | OUTPATIENT
Start: 2020-01-01 | End: 2020-01-01 | Stop reason: HOSPADM

## 2020-01-01 RX ORDER — METHYLPREDNISOLONE SODIUM SUCCINATE 40 MG/ML
40 INJECTION, POWDER, LYOPHILIZED, FOR SOLUTION INTRAMUSCULAR; INTRAVENOUS EVERY 8 HOURS
Status: DISCONTINUED | OUTPATIENT
Start: 2020-01-01 | End: 2020-01-01

## 2020-01-01 RX ORDER — FLUCONAZOLE 150 MG/1
150 TABLET ORAL ONCE
COMMUNITY

## 2020-01-01 RX ORDER — ALBUTEROL SULFATE 2.5 MG/3ML
2.5 SOLUTION RESPIRATORY (INHALATION) EVERY 4 HOURS
Status: DISCONTINUED | OUTPATIENT
Start: 2020-01-01 | End: 2020-01-01

## 2020-01-01 RX ORDER — POTASSIUM CHLORIDE 29.8 MG/ML
20 INJECTION INTRAVENOUS PRN
Status: DISCONTINUED | OUTPATIENT
Start: 2020-01-01 | End: 2020-01-01

## 2020-01-01 RX ORDER — METOPROLOL SUCCINATE 25 MG/1
25 TABLET, EXTENDED RELEASE ORAL DAILY
Status: DISCONTINUED | OUTPATIENT
Start: 2020-01-01 | End: 2020-01-01

## 2020-01-01 RX ORDER — POLYETHYLENE GLYCOL 3350 17 G/17G
17 POWDER, FOR SOLUTION ORAL DAILY PRN
Status: DISCONTINUED | OUTPATIENT
Start: 2020-01-01 | End: 2020-01-01

## 2020-01-01 RX ORDER — CEFUROXIME AXETIL 500 MG/1
500 TABLET ORAL 2 TIMES DAILY
COMMUNITY

## 2020-01-01 RX ORDER — HEPARIN SODIUM 1000 [USP'U]/ML
40 INJECTION, SOLUTION INTRAVENOUS; SUBCUTANEOUS PRN
Status: DISCONTINUED | OUTPATIENT
Start: 2020-01-01 | End: 2020-01-01

## 2020-01-01 RX ORDER — BUMETANIDE 0.25 MG/ML
2 INJECTION, SOLUTION INTRAMUSCULAR; INTRAVENOUS ONCE
Status: COMPLETED | OUTPATIENT
Start: 2020-01-01 | End: 2020-01-01

## 2020-01-01 RX ADMIN — Medication 1 DROP: at 09:43

## 2020-01-01 RX ADMIN — Medication 10 ML: at 19:52

## 2020-01-01 RX ADMIN — POLYMYXIN B SULFATE, TRIMETHOPRIM SULFATE 1 DROP: 10000; 1 SOLUTION/ DROPS OPHTHALMIC at 08:03

## 2020-01-01 RX ADMIN — FENTANYL CITRATE 25 MCG: 50 INJECTION, SOLUTION INTRAMUSCULAR; INTRAVENOUS at 01:40

## 2020-01-01 RX ADMIN — IPRATROPIUM BROMIDE AND ALBUTEROL SULFATE 1 AMPULE: .5; 3 SOLUTION RESPIRATORY (INHALATION) at 19:13

## 2020-01-01 RX ADMIN — INSULIN LISPRO 2 UNITS: 100 INJECTION, SOLUTION INTRAVENOUS; SUBCUTANEOUS at 15:40

## 2020-01-01 RX ADMIN — INSULIN LISPRO 2 UNITS: 100 INJECTION, SOLUTION INTRAVENOUS; SUBCUTANEOUS at 04:11

## 2020-01-01 RX ADMIN — PROPOFOL 10 MCG/KG/MIN: 10 INJECTION, EMULSION INTRAVENOUS at 18:43

## 2020-01-01 RX ADMIN — Medication 30 MG: at 08:27

## 2020-01-01 RX ADMIN — POLYMYXIN B SULFATE, TRIMETHOPRIM SULFATE 1 DROP: 10000; 1 SOLUTION/ DROPS OPHTHALMIC at 11:46

## 2020-01-01 RX ADMIN — PHENYLEPHRINE HYDROCHLORIDE 25 MCG/MIN: 10 INJECTION INTRAVENOUS at 10:18

## 2020-01-01 RX ADMIN — SODIUM BICARBONATE: 84 INJECTION, SOLUTION INTRAVENOUS at 10:24

## 2020-01-01 RX ADMIN — Medication 10 ML: at 17:27

## 2020-01-01 RX ADMIN — FENTANYL CITRATE 50 MCG: 50 INJECTION, SOLUTION INTRAMUSCULAR; INTRAVENOUS at 16:28

## 2020-01-01 RX ADMIN — FENTANYL CITRATE 25 MCG: 50 INJECTION, SOLUTION INTRAMUSCULAR; INTRAVENOUS at 03:25

## 2020-01-01 RX ADMIN — INSULIN LISPRO 2 UNITS: 100 INJECTION, SOLUTION INTRAVENOUS; SUBCUTANEOUS at 04:14

## 2020-01-01 RX ADMIN — SODIUM CHLORIDE: 9 INJECTION, SOLUTION INTRAVENOUS at 19:55

## 2020-01-01 RX ADMIN — POLYMYXIN B SULFATE, TRIMETHOPRIM SULFATE 1 DROP: 10000; 1 SOLUTION/ DROPS OPHTHALMIC at 11:59

## 2020-01-01 RX ADMIN — HEPARIN SODIUM 2710 UNITS: 1000 INJECTION INTRAVENOUS; SUBCUTANEOUS at 13:44

## 2020-01-01 RX ADMIN — Medication 1 DROP: at 21:11

## 2020-01-01 RX ADMIN — MIDODRINE HYDROCHLORIDE 5 MG: 5 TABLET ORAL at 16:38

## 2020-01-01 RX ADMIN — METOPROLOL TARTRATE 25 MG: 25 TABLET ORAL at 19:51

## 2020-01-01 RX ADMIN — PREDNISONE 30 MG: 20 TABLET ORAL at 08:13

## 2020-01-01 RX ADMIN — IPRATROPIUM BROMIDE AND ALBUTEROL SULFATE 1 AMPULE: .5; 3 SOLUTION RESPIRATORY (INHALATION) at 17:12

## 2020-01-01 RX ADMIN — ASPIRIN 81 MG: 81 TABLET, CHEWABLE ORAL at 08:18

## 2020-01-01 RX ADMIN — POLYMYXIN B SULFATE, TRIMETHOPRIM SULFATE 1 DROP: 10000; 1 SOLUTION/ DROPS OPHTHALMIC at 16:12

## 2020-01-01 RX ADMIN — Medication 5 MCG/MIN: at 15:37

## 2020-01-01 RX ADMIN — METHYLPREDNISOLONE SODIUM SUCCINATE 40 MG: 40 INJECTION, POWDER, FOR SOLUTION INTRAMUSCULAR; INTRAVENOUS at 08:23

## 2020-01-01 RX ADMIN — POLYETHYLENE GLYCOL 3350 17 G: 17 POWDER, FOR SOLUTION ORAL at 20:19

## 2020-01-01 RX ADMIN — CISATRACURIUM BESYLATE 2.5 MCG/KG/MIN: 10 INJECTION, SOLUTION INTRAVENOUS at 21:52

## 2020-01-01 RX ADMIN — DOCUSATE SODIUM 100 MG: 50 LIQUID ORAL at 08:58

## 2020-01-01 RX ADMIN — METHYLPREDNISOLONE SODIUM SUCCINATE 40 MG: 40 INJECTION, POWDER, FOR SOLUTION INTRAMUSCULAR; INTRAVENOUS at 08:34

## 2020-01-01 RX ADMIN — Medication 10 ML: at 08:55

## 2020-01-01 RX ADMIN — BACITRACIN ZINC AND POLYMYXIN B SULFATE: 500; 10000 OINTMENT OPHTHALMIC at 08:13

## 2020-01-01 RX ADMIN — METOPROLOL TARTRATE 25 MG: 25 TABLET ORAL at 08:27

## 2020-01-01 RX ADMIN — POLYMYXIN B SULFATE, TRIMETHOPRIM SULFATE 1 DROP: 10000; 1 SOLUTION/ DROPS OPHTHALMIC at 00:10

## 2020-01-01 RX ADMIN — IPRATROPIUM BROMIDE AND ALBUTEROL SULFATE 1 AMPULE: .5; 3 SOLUTION RESPIRATORY (INHALATION) at 08:01

## 2020-01-01 RX ADMIN — Medication 100 MCG/HR: at 00:02

## 2020-01-01 RX ADMIN — DOCUSATE SODIUM 100 MG: 50 LIQUID ORAL at 11:31

## 2020-01-01 RX ADMIN — Medication 15 MG/HR: at 19:08

## 2020-01-01 RX ADMIN — IPRATROPIUM BROMIDE AND ALBUTEROL SULFATE 1 AMPULE: .5; 3 SOLUTION RESPIRATORY (INHALATION) at 08:28

## 2020-01-01 RX ADMIN — ALBUTEROL SULFATE 2.5 MG: 2.5 SOLUTION RESPIRATORY (INHALATION) at 03:24

## 2020-01-01 RX ADMIN — POLYMYXIN B SULFATE, TRIMETHOPRIM SULFATE 1 DROP: 10000; 1 SOLUTION/ DROPS OPHTHALMIC at 08:28

## 2020-01-01 RX ADMIN — DOCUSATE SODIUM 100 MG: 50 LIQUID ORAL at 19:51

## 2020-01-01 RX ADMIN — Medication 10 ML: at 20:10

## 2020-01-01 RX ADMIN — MAGNESIUM SULFATE HEPTAHYDRATE 1 G: 1 INJECTION, SOLUTION INTRAVENOUS at 07:13

## 2020-01-01 RX ADMIN — ALBUTEROL SULFATE 2.5 MG: 2.5 SOLUTION RESPIRATORY (INHALATION) at 23:30

## 2020-01-01 RX ADMIN — MIDODRINE HYDROCHLORIDE 5 MG: 5 TABLET ORAL at 12:14

## 2020-01-01 RX ADMIN — POLYMYXIN B SULFATE, TRIMETHOPRIM SULFATE 1 DROP: 10000; 1 SOLUTION/ DROPS OPHTHALMIC at 19:51

## 2020-01-01 RX ADMIN — Medication 4 MG/HR: at 07:05

## 2020-01-01 RX ADMIN — SODIUM CHLORIDE 6.81 UNITS/HR: 9 INJECTION, SOLUTION INTRAVENOUS at 09:28

## 2020-01-01 RX ADMIN — ASPIRIN 81 MG: 81 TABLET, CHEWABLE ORAL at 08:49

## 2020-01-01 RX ADMIN — PHENYLEPHRINE HYDROCHLORIDE 50 MCG/MIN: 10 INJECTION INTRAVENOUS at 09:56

## 2020-01-01 RX ADMIN — INSULIN LISPRO 2 UNITS: 100 INJECTION, SOLUTION INTRAVENOUS; SUBCUTANEOUS at 04:30

## 2020-01-01 RX ADMIN — HEPARIN SODIUM 1970 UNITS: 1000 INJECTION INTRAVENOUS; SUBCUTANEOUS at 05:50

## 2020-01-01 RX ADMIN — Medication 10 ML: at 20:22

## 2020-01-01 RX ADMIN — IPRATROPIUM BROMIDE AND ALBUTEROL SULFATE 1 AMPULE: .5; 3 SOLUTION RESPIRATORY (INHALATION) at 11:17

## 2020-01-01 RX ADMIN — IPRATROPIUM BROMIDE AND ALBUTEROL SULFATE 1 AMPULE: .5; 3 SOLUTION RESPIRATORY (INHALATION) at 16:11

## 2020-01-01 RX ADMIN — Medication 10 ML: at 08:04

## 2020-01-01 RX ADMIN — PREDNISONE 40 MG: 20 TABLET ORAL at 08:02

## 2020-01-01 RX ADMIN — POLYMYXIN B SULFATE, TRIMETHOPRIM SULFATE 1 DROP: 10000; 1 SOLUTION/ DROPS OPHTHALMIC at 08:22

## 2020-01-01 RX ADMIN — IPRATROPIUM BROMIDE AND ALBUTEROL SULFATE 1 AMPULE: .5; 3 SOLUTION RESPIRATORY (INHALATION) at 20:39

## 2020-01-01 RX ADMIN — PROPOFOL 35 MCG/KG/MIN: 10 INJECTION, EMULSION INTRAVENOUS at 22:42

## 2020-01-01 RX ADMIN — BACITRACIN ZINC AND POLYMYXIN B SULFATE: 500; 10000 OINTMENT OPHTHALMIC at 21:19

## 2020-01-01 RX ADMIN — Medication 1 DROP: at 14:00

## 2020-01-01 RX ADMIN — Medication 30 MG: at 08:14

## 2020-01-01 RX ADMIN — ALBUTEROL SULFATE 2.5 MG: 2.5 SOLUTION RESPIRATORY (INHALATION) at 03:56

## 2020-01-01 RX ADMIN — FENTANYL CITRATE 50 MCG: 50 INJECTION, SOLUTION INTRAMUSCULAR; INTRAVENOUS at 14:39

## 2020-01-01 RX ADMIN — SODIUM CHLORIDE 500 ML: 9 INJECTION, SOLUTION INTRAVENOUS at 05:43

## 2020-01-01 RX ADMIN — METHYLPREDNISOLONE SODIUM SUCCINATE 40 MG: 40 INJECTION, POWDER, FOR SOLUTION INTRAMUSCULAR; INTRAVENOUS at 08:46

## 2020-01-01 RX ADMIN — Medication 10 ML: at 09:11

## 2020-01-01 RX ADMIN — SODIUM CHLORIDE: 9 INJECTION, SOLUTION INTRAVENOUS at 15:27

## 2020-01-01 RX ADMIN — Medication 1 DROP: at 14:02

## 2020-01-01 RX ADMIN — INSULIN LISPRO 4 UNITS: 100 INJECTION, SOLUTION INTRAVENOUS; SUBCUTANEOUS at 08:49

## 2020-01-01 RX ADMIN — DESMOPRESSIN ACETATE 40 MG: 0.2 TABLET ORAL at 20:42

## 2020-01-01 RX ADMIN — PROPOFOL 15 MCG/KG/MIN: 10 INJECTION, EMULSION INTRAVENOUS at 05:50

## 2020-01-01 RX ADMIN — Medication 1 DROP: at 08:08

## 2020-01-01 RX ADMIN — FENTANYL CITRATE 50 MCG: 50 INJECTION, SOLUTION INTRAMUSCULAR; INTRAVENOUS at 06:05

## 2020-01-01 RX ADMIN — Medication 100 MCG/HR: at 01:01

## 2020-01-01 RX ADMIN — HEPARIN SODIUM 5000 UNITS: 5000 INJECTION INTRAVENOUS; SUBCUTANEOUS at 14:36

## 2020-01-01 RX ADMIN — MIDODRINE HYDROCHLORIDE 5 MG: 5 TABLET ORAL at 08:02

## 2020-01-01 RX ADMIN — PANTOPRAZOLE SODIUM 40 MG: 40 INJECTION, POWDER, FOR SOLUTION INTRAVENOUS at 08:55

## 2020-01-01 RX ADMIN — POLYETHYLENE GLYCOL 3350 17 G: 17 POWDER, FOR SOLUTION ORAL at 20:10

## 2020-01-01 RX ADMIN — PREDNISONE 40 MG: 20 TABLET ORAL at 08:16

## 2020-01-01 RX ADMIN — BACITRACIN ZINC AND POLYMYXIN B SULFATE: 500; 10000 OINTMENT OPHTHALMIC at 12:56

## 2020-01-01 RX ADMIN — IPRATROPIUM BROMIDE AND ALBUTEROL SULFATE 1 AMPULE: .5; 3 SOLUTION RESPIRATORY (INHALATION) at 12:01

## 2020-01-01 RX ADMIN — PHENYLEPHRINE HYDROCHLORIDE 80 MCG/MIN: 10 INJECTION INTRAVENOUS at 19:56

## 2020-01-01 RX ADMIN — SODIUM CHLORIDE: 9 INJECTION, SOLUTION INTRAVENOUS at 05:31

## 2020-01-01 RX ADMIN — ALBUMIN (HUMAN) 25 G: 0.25 INJECTION, SOLUTION INTRAVENOUS at 23:48

## 2020-01-01 RX ADMIN — METHYLPREDNISOLONE SODIUM SUCCINATE 40 MG: 40 INJECTION, POWDER, FOR SOLUTION INTRAMUSCULAR; INTRAVENOUS at 01:51

## 2020-01-01 RX ADMIN — METOPROLOL TARTRATE 25 MG: 25 TABLET ORAL at 07:44

## 2020-01-01 RX ADMIN — DOCUSATE SODIUM 100 MG: 50 LIQUID ORAL at 20:19

## 2020-01-01 RX ADMIN — INSULIN LISPRO 2 UNITS: 100 INJECTION, SOLUTION INTRAVENOUS; SUBCUTANEOUS at 22:47

## 2020-01-01 RX ADMIN — INSULIN LISPRO 4 UNITS: 100 INJECTION, SOLUTION INTRAVENOUS; SUBCUTANEOUS at 20:09

## 2020-01-01 RX ADMIN — HEPARIN SODIUM 1970 UNITS: 1000 INJECTION INTRAVENOUS; SUBCUTANEOUS at 13:31

## 2020-01-01 RX ADMIN — FENTANYL CITRATE 50 MCG: 50 INJECTION, SOLUTION INTRAMUSCULAR; INTRAVENOUS at 08:19

## 2020-01-01 RX ADMIN — ALBUTEROL SULFATE 2.5 MG: 2.5 SOLUTION RESPIRATORY (INHALATION) at 00:00

## 2020-01-01 RX ADMIN — INSULIN GLARGINE 20 UNITS: 100 INJECTION, SOLUTION SUBCUTANEOUS at 21:47

## 2020-01-01 RX ADMIN — HEPARIN SODIUM AND DEXTROSE 12 UNITS/KG/HR: 10000; 5 INJECTION INTRAVENOUS at 22:18

## 2020-01-01 RX ADMIN — Medication 15 MG/HR: at 09:58

## 2020-01-01 RX ADMIN — IPRATROPIUM BROMIDE AND ALBUTEROL SULFATE 1 AMPULE: .5; 3 SOLUTION RESPIRATORY (INHALATION) at 19:36

## 2020-01-01 RX ADMIN — HEPARIN SODIUM 5000 UNITS: 5000 INJECTION INTRAVENOUS; SUBCUTANEOUS at 05:25

## 2020-01-01 RX ADMIN — OSELTAMIVIR PHOSPHATE 30 MG: 6 POWDER, FOR SUSPENSION ORAL at 08:34

## 2020-01-01 RX ADMIN — BUMETANIDE 2 MG: 0.25 INJECTION INTRAMUSCULAR; INTRAVENOUS at 14:27

## 2020-01-01 RX ADMIN — IPRATROPIUM BROMIDE AND ALBUTEROL SULFATE 1 AMPULE: .5; 3 SOLUTION RESPIRATORY (INHALATION) at 08:37

## 2020-01-01 RX ADMIN — POLYMYXIN B SULFATE, TRIMETHOPRIM SULFATE 1 DROP: 10000; 1 SOLUTION/ DROPS OPHTHALMIC at 21:14

## 2020-01-01 RX ADMIN — POLYMYXIN B SULFATE, TRIMETHOPRIM SULFATE 1 DROP: 10000; 1 SOLUTION/ DROPS OPHTHALMIC at 08:13

## 2020-01-01 RX ADMIN — Medication 10 ML: at 08:35

## 2020-01-01 RX ADMIN — POLYMYXIN B SULFATE, TRIMETHOPRIM SULFATE 1 DROP: 10000; 1 SOLUTION/ DROPS OPHTHALMIC at 23:45

## 2020-01-01 RX ADMIN — METHYLPREDNISOLONE SODIUM SUCCINATE 40 MG: 40 INJECTION, POWDER, FOR SOLUTION INTRAMUSCULAR; INTRAVENOUS at 08:54

## 2020-01-01 RX ADMIN — INSULIN LISPRO 6 UNITS: 100 INJECTION, SOLUTION INTRAVENOUS; SUBCUTANEOUS at 16:43

## 2020-01-01 RX ADMIN — INSULIN LISPRO 2 UNITS: 100 INJECTION, SOLUTION INTRAVENOUS; SUBCUTANEOUS at 04:04

## 2020-01-01 RX ADMIN — FENTANYL CITRATE 25 MCG: 50 INJECTION, SOLUTION INTRAMUSCULAR; INTRAVENOUS at 06:15

## 2020-01-01 RX ADMIN — CISATRACURIUM BESYLATE 2 MCG/KG/MIN: 10 INJECTION, SOLUTION INTRAVENOUS at 00:50

## 2020-01-01 RX ADMIN — INSULIN LISPRO 6 UNITS: 100 INJECTION, SOLUTION INTRAVENOUS; SUBCUTANEOUS at 08:10

## 2020-01-01 RX ADMIN — FENTANYL CITRATE 50 MCG: 50 INJECTION, SOLUTION INTRAMUSCULAR; INTRAVENOUS at 17:53

## 2020-01-01 RX ADMIN — DILTIAZEM HYDROCHLORIDE 5 MG/HR: 5 INJECTION INTRAVENOUS at 10:57

## 2020-01-01 RX ADMIN — HEPARIN SODIUM 5000 UNITS: 5000 INJECTION INTRAVENOUS; SUBCUTANEOUS at 20:58

## 2020-01-01 RX ADMIN — INSULIN LISPRO 2 UNITS: 100 INJECTION, SOLUTION INTRAVENOUS; SUBCUTANEOUS at 12:12

## 2020-01-01 RX ADMIN — INSULIN GLARGINE 10 UNITS: 100 INJECTION, SOLUTION SUBCUTANEOUS at 08:10

## 2020-01-01 RX ADMIN — IPRATROPIUM BROMIDE AND ALBUTEROL SULFATE 1 AMPULE: .5; 3 SOLUTION RESPIRATORY (INHALATION) at 19:32

## 2020-01-01 RX ADMIN — Medication 1 DROP: at 19:51

## 2020-01-01 RX ADMIN — DESMOPRESSIN ACETATE 40 MG: 0.2 TABLET ORAL at 20:27

## 2020-01-01 RX ADMIN — MORPHINE SULFATE 2 MG: 2 INJECTION, SOLUTION INTRAMUSCULAR; INTRAVENOUS at 12:03

## 2020-01-01 RX ADMIN — HEPARIN SODIUM AND DEXTROSE 18 UNITS/KG/HR: 10000; 5 INJECTION INTRAVENOUS at 16:20

## 2020-01-01 RX ADMIN — POLYMYXIN B SULFATE, TRIMETHOPRIM SULFATE 1 DROP: 10000; 1 SOLUTION/ DROPS OPHTHALMIC at 03:54

## 2020-01-01 RX ADMIN — DESMOPRESSIN ACETATE 40 MG: 0.2 TABLET ORAL at 20:22

## 2020-01-01 RX ADMIN — Medication 1 DROP: at 14:10

## 2020-01-01 RX ADMIN — BACITRACIN ZINC AND POLYMYXIN B SULFATE: 500; 10000 OINTMENT OPHTHALMIC at 21:11

## 2020-01-01 RX ADMIN — FENTANYL CITRATE 50 MCG: 50 INJECTION, SOLUTION INTRAMUSCULAR; INTRAVENOUS at 01:59

## 2020-01-01 RX ADMIN — POLYMYXIN B SULFATE, TRIMETHOPRIM SULFATE 1 DROP: 10000; 1 SOLUTION/ DROPS OPHTHALMIC at 15:35

## 2020-01-01 RX ADMIN — Medication 30 MG: at 08:55

## 2020-01-01 RX ADMIN — INSULIN LISPRO 3 UNITS: 100 INJECTION, SOLUTION INTRAVENOUS; SUBCUTANEOUS at 17:14

## 2020-01-01 RX ADMIN — Medication 5 MG/HR: at 00:00

## 2020-01-01 RX ADMIN — DOCUSATE SODIUM 100 MG: 50 LIQUID ORAL at 21:06

## 2020-01-01 RX ADMIN — POLYMYXIN B SULFATE, TRIMETHOPRIM SULFATE 1 DROP: 10000; 1 SOLUTION/ DROPS OPHTHALMIC at 12:42

## 2020-01-01 RX ADMIN — HEPARIN SODIUM AND DEXTROSE 16 UNITS/KG/HR: 10000; 5 INJECTION INTRAVENOUS at 05:49

## 2020-01-01 RX ADMIN — INSULIN LISPRO 6 UNITS: 100 INJECTION, SOLUTION INTRAVENOUS; SUBCUTANEOUS at 16:53

## 2020-01-01 RX ADMIN — Medication 30 MG: at 09:20

## 2020-01-01 RX ADMIN — Medication 1 MG/HR: at 05:23

## 2020-01-01 RX ADMIN — IPRATROPIUM BROMIDE AND ALBUTEROL SULFATE 1 AMPULE: .5; 3 SOLUTION RESPIRATORY (INHALATION) at 08:39

## 2020-01-01 RX ADMIN — IPRATROPIUM BROMIDE AND ALBUTEROL SULFATE 1 AMPULE: .5; 3 SOLUTION RESPIRATORY (INHALATION) at 08:04

## 2020-01-01 RX ADMIN — HEPARIN SODIUM 1970 UNITS: 1000 INJECTION INTRAVENOUS; SUBCUTANEOUS at 13:00

## 2020-01-01 RX ADMIN — Medication 30 MG: at 07:44

## 2020-01-01 RX ADMIN — SODIUM ZIRCONIUM CYCLOSILICATE 10 G: 10 POWDER, FOR SUSPENSION ORAL at 16:44

## 2020-01-01 RX ADMIN — ALBUTEROL SULFATE 2.5 MG: 2.5 SOLUTION RESPIRATORY (INHALATION) at 03:03

## 2020-01-01 RX ADMIN — SODIUM CHLORIDE: 9 INJECTION, SOLUTION INTRAVENOUS at 05:50

## 2020-01-01 RX ADMIN — POLYMYXIN B SULFATE, TRIMETHOPRIM SULFATE 1 DROP: 10000; 1 SOLUTION/ DROPS OPHTHALMIC at 07:44

## 2020-01-01 RX ADMIN — INSULIN LISPRO 4 UNITS: 100 INJECTION, SOLUTION INTRAVENOUS; SUBCUTANEOUS at 19:58

## 2020-01-01 RX ADMIN — FENTANYL CITRATE 50 MCG: 50 INJECTION, SOLUTION INTRAMUSCULAR; INTRAVENOUS at 09:29

## 2020-01-01 RX ADMIN — ASPIRIN 81 MG: 81 TABLET, CHEWABLE ORAL at 08:52

## 2020-01-01 RX ADMIN — DOCUSATE SODIUM 100 MG: 50 LIQUID ORAL at 08:54

## 2020-01-01 RX ADMIN — INSULIN GLARGINE 20 UNITS: 100 INJECTION, SOLUTION SUBCUTANEOUS at 21:06

## 2020-01-01 RX ADMIN — SODIUM BICARBONATE: 84 INJECTION, SOLUTION INTRAVENOUS at 09:19

## 2020-01-01 RX ADMIN — IPRATROPIUM BROMIDE AND ALBUTEROL SULFATE 1 AMPULE: .5; 3 SOLUTION RESPIRATORY (INHALATION) at 11:46

## 2020-01-01 RX ADMIN — MIDODRINE HYDROCHLORIDE 5 MG: 5 TABLET ORAL at 08:27

## 2020-01-01 RX ADMIN — BACITRACIN ZINC AND POLYMYXIN B SULFATE: 500; 10000 OINTMENT OPHTHALMIC at 12:11

## 2020-01-01 RX ADMIN — CISATRACURIUM BESYLATE 3 MCG/KG/MIN: 10 INJECTION, SOLUTION INTRAVENOUS at 01:48

## 2020-01-01 RX ADMIN — Medication 30 MG/HR: at 00:10

## 2020-01-01 RX ADMIN — BUMETANIDE 2 MG/HR: 0.25 INJECTION INTRAMUSCULAR; INTRAVENOUS at 13:39

## 2020-01-01 RX ADMIN — POLYMYXIN B SULFATE, TRIMETHOPRIM SULFATE 1 DROP: 10000; 1 SOLUTION/ DROPS OPHTHALMIC at 12:23

## 2020-01-01 RX ADMIN — HEPARIN SODIUM 1970 UNITS: 1000 INJECTION INTRAVENOUS; SUBCUTANEOUS at 05:33

## 2020-01-01 RX ADMIN — IPRATROPIUM BROMIDE AND ALBUTEROL SULFATE 1 AMPULE: .5; 3 SOLUTION RESPIRATORY (INHALATION) at 19:54

## 2020-01-01 RX ADMIN — METOPROLOL TARTRATE 5 MG: 5 INJECTION, SOLUTION INTRAVENOUS at 13:52

## 2020-01-01 RX ADMIN — IPRATROPIUM BROMIDE AND ALBUTEROL SULFATE 1 AMPULE: .5; 3 SOLUTION RESPIRATORY (INHALATION) at 19:08

## 2020-01-01 RX ADMIN — SODIUM CHLORIDE 5 ML/HR: 9 INJECTION, SOLUTION INTRAVENOUS at 17:10

## 2020-01-01 RX ADMIN — METOPROLOL TARTRATE 5 MG: 5 INJECTION, SOLUTION INTRAVENOUS at 08:00

## 2020-01-01 RX ADMIN — INSULIN LISPRO 2 UNITS: 100 INJECTION, SOLUTION INTRAVENOUS; SUBCUTANEOUS at 13:01

## 2020-01-01 RX ADMIN — HEPARIN SODIUM 5000 UNITS: 5000 INJECTION INTRAVENOUS; SUBCUTANEOUS at 20:09

## 2020-01-01 RX ADMIN — Medication 30 MG: at 10:53

## 2020-01-01 RX ADMIN — IPRATROPIUM BROMIDE AND ALBUTEROL SULFATE 1 AMPULE: .5; 3 SOLUTION RESPIRATORY (INHALATION) at 13:11

## 2020-01-01 RX ADMIN — MIDODRINE HYDROCHLORIDE 5 MG: 5 TABLET ORAL at 09:18

## 2020-01-01 RX ADMIN — BACITRACIN ZINC AND POLYMYXIN B SULFATE: 500; 10000 OINTMENT OPHTHALMIC at 20:43

## 2020-01-01 RX ADMIN — PROPOFOL 15 MCG/KG/MIN: 10 INJECTION, EMULSION INTRAVENOUS at 19:56

## 2020-01-01 RX ADMIN — Medication 30 MG: at 09:38

## 2020-01-01 RX ADMIN — INSULIN LISPRO 2 UNITS: 100 INJECTION, SOLUTION INTRAVENOUS; SUBCUTANEOUS at 19:51

## 2020-01-01 RX ADMIN — DOCUSATE SODIUM 100 MG: 50 LIQUID ORAL at 08:34

## 2020-01-01 RX ADMIN — POTASSIUM CHLORIDE 20 MEQ: 29.8 INJECTION, SOLUTION INTRAVENOUS at 06:03

## 2020-01-01 RX ADMIN — INSULIN LISPRO 2 UNITS: 100 INJECTION, SOLUTION INTRAVENOUS; SUBCUTANEOUS at 11:51

## 2020-01-01 RX ADMIN — INSULIN LISPRO 2 UNITS: 100 INJECTION, SOLUTION INTRAVENOUS; SUBCUTANEOUS at 08:46

## 2020-01-01 RX ADMIN — BUMETANIDE 2 MG: 0.25 INJECTION INTRAMUSCULAR; INTRAVENOUS at 10:14

## 2020-01-01 RX ADMIN — DOCUSATE SODIUM 100 MG: 50 LIQUID ORAL at 07:45

## 2020-01-01 RX ADMIN — INSULIN GLARGINE 20 UNITS: 100 INJECTION, SOLUTION SUBCUTANEOUS at 21:20

## 2020-01-01 RX ADMIN — DOCUSATE SODIUM 100 MG: 50 LIQUID ORAL at 08:52

## 2020-01-01 RX ADMIN — IPRATROPIUM BROMIDE AND ALBUTEROL SULFATE 1 AMPULE: .5; 3 SOLUTION RESPIRATORY (INHALATION) at 12:11

## 2020-01-01 RX ADMIN — Medication 25 MCG/HR: at 12:20

## 2020-01-01 RX ADMIN — HEPARIN SODIUM AND DEXTROSE 19 UNITS/KG/HR: 10000; 5 INJECTION INTRAVENOUS at 16:43

## 2020-01-01 RX ADMIN — DESMOPRESSIN ACETATE 40 MG: 0.2 TABLET ORAL at 21:20

## 2020-01-01 RX ADMIN — METOPROLOL TARTRATE 25 MG: 25 TABLET ORAL at 20:51

## 2020-01-01 RX ADMIN — METOPROLOL TARTRATE 25 MG: 25 TABLET ORAL at 09:38

## 2020-01-01 RX ADMIN — INSULIN LISPRO 4 UNITS: 100 INJECTION, SOLUTION INTRAVENOUS; SUBCUTANEOUS at 16:03

## 2020-01-01 RX ADMIN — PREDNISONE 40 MG: 20 TABLET ORAL at 08:27

## 2020-01-01 RX ADMIN — DOCUSATE SODIUM 100 MG: 50 LIQUID ORAL at 08:17

## 2020-01-01 RX ADMIN — INSULIN GLARGINE 20 UNITS: 100 INJECTION, SOLUTION SUBCUTANEOUS at 21:21

## 2020-01-01 RX ADMIN — POLYMYXIN B SULFATE, TRIMETHOPRIM SULFATE 1 DROP: 10000; 1 SOLUTION/ DROPS OPHTHALMIC at 12:12

## 2020-01-01 RX ADMIN — Medication 10 ML: at 20:24

## 2020-01-01 RX ADMIN — PROPOFOL 10 MCG/KG/MIN: 10 INJECTION, EMULSION INTRAVENOUS at 18:04

## 2020-01-01 RX ADMIN — HEPARIN SODIUM 1970 UNITS: 1000 INJECTION INTRAVENOUS; SUBCUTANEOUS at 23:57

## 2020-01-01 RX ADMIN — Medication 50 MCG/HR: at 23:46

## 2020-01-01 RX ADMIN — HEPARIN SODIUM 5000 UNITS: 5000 INJECTION INTRAVENOUS; SUBCUTANEOUS at 05:17

## 2020-01-01 RX ADMIN — POLYMYXIN B SULFATE, TRIMETHOPRIM SULFATE 1 DROP: 10000; 1 SOLUTION/ DROPS OPHTHALMIC at 12:24

## 2020-01-01 RX ADMIN — INSULIN LISPRO 4 UNITS: 100 INJECTION, SOLUTION INTRAVENOUS; SUBCUTANEOUS at 12:16

## 2020-01-01 RX ADMIN — Medication 30 MG: at 08:18

## 2020-01-01 RX ADMIN — CISATRACURIUM BESYLATE 3 MCG/KG/MIN: 10 INJECTION, SOLUTION INTRAVENOUS at 15:33

## 2020-01-01 RX ADMIN — IPRATROPIUM BROMIDE AND ALBUTEROL SULFATE 1 AMPULE: .5; 3 SOLUTION RESPIRATORY (INHALATION) at 15:20

## 2020-01-01 RX ADMIN — Medication 1 DROP: at 08:53

## 2020-01-01 RX ADMIN — Medication 10 ML: at 20:57

## 2020-01-01 RX ADMIN — METOPROLOL TARTRATE 25 MG: 25 TABLET ORAL at 20:44

## 2020-01-01 RX ADMIN — METHYLPREDNISOLONE SODIUM SUCCINATE 40 MG: 40 INJECTION, POWDER, FOR SOLUTION INTRAMUSCULAR; INTRAVENOUS at 09:10

## 2020-01-01 RX ADMIN — ASPIRIN 81 MG: 81 TABLET, CHEWABLE ORAL at 11:58

## 2020-01-01 RX ADMIN — INSULIN LISPRO 4 UNITS: 100 INJECTION, SOLUTION INTRAVENOUS; SUBCUTANEOUS at 16:52

## 2020-01-01 RX ADMIN — FENTANYL CITRATE 25 MCG: 50 INJECTION, SOLUTION INTRAMUSCULAR; INTRAVENOUS at 11:56

## 2020-01-01 RX ADMIN — PREDNISONE 30 MG: 20 TABLET ORAL at 08:18

## 2020-01-01 RX ADMIN — DESMOPRESSIN ACETATE 40 MG: 0.2 TABLET ORAL at 20:07

## 2020-01-01 RX ADMIN — HEPARIN SODIUM AND DEXTROSE 16 UNITS/KG/HR: 10000; 5 INJECTION INTRAVENOUS at 02:15

## 2020-01-01 RX ADMIN — METOPROLOL TARTRATE 25 MG: 25 TABLET ORAL at 08:18

## 2020-01-01 RX ADMIN — DOCUSATE SODIUM 100 MG: 50 LIQUID ORAL at 20:04

## 2020-01-01 RX ADMIN — IPRATROPIUM BROMIDE AND ALBUTEROL SULFATE 1 AMPULE: .5; 3 SOLUTION RESPIRATORY (INHALATION) at 19:30

## 2020-01-01 RX ADMIN — Medication 10 ML: at 08:51

## 2020-01-01 RX ADMIN — DOCUSATE SODIUM 100 MG: 50 LIQUID ORAL at 21:14

## 2020-01-01 RX ADMIN — METHYLPREDNISOLONE SODIUM SUCCINATE 40 MG: 40 INJECTION, POWDER, FOR SOLUTION INTRAMUSCULAR; INTRAVENOUS at 23:03

## 2020-01-01 RX ADMIN — INSULIN LISPRO 4 UNITS: 100 INJECTION, SOLUTION INTRAVENOUS; SUBCUTANEOUS at 12:18

## 2020-01-01 RX ADMIN — MIDODRINE HYDROCHLORIDE 5 MG: 5 TABLET ORAL at 07:43

## 2020-01-01 RX ADMIN — SODIUM CHLORIDE 1000 ML: 9 INJECTION, SOLUTION INTRAVENOUS at 01:54

## 2020-01-01 RX ADMIN — POLYMYXIN B SULFATE, TRIMETHOPRIM SULFATE 1 DROP: 10000; 1 SOLUTION/ DROPS OPHTHALMIC at 23:33

## 2020-01-01 RX ADMIN — SODIUM CHLORIDE 500 ML: 9 INJECTION, SOLUTION INTRAVENOUS at 09:47

## 2020-01-01 RX ADMIN — PROPOFOL 20 MCG/KG/MIN: 10 INJECTION, EMULSION INTRAVENOUS at 02:18

## 2020-01-01 RX ADMIN — POLYMYXIN B SULFATE, TRIMETHOPRIM SULFATE 1 DROP: 10000; 1 SOLUTION/ DROPS OPHTHALMIC at 00:23

## 2020-01-01 RX ADMIN — ASPIRIN 81 MG: 81 TABLET, CHEWABLE ORAL at 09:32

## 2020-01-01 RX ADMIN — CISATRACURIUM BESYLATE 3 MCG/KG/MIN: 10 INJECTION, SOLUTION INTRAVENOUS at 05:21

## 2020-01-01 RX ADMIN — POTASSIUM CHLORIDE 20 MEQ: 29.8 INJECTION, SOLUTION INTRAVENOUS at 06:57

## 2020-01-01 RX ADMIN — BACITRACIN ZINC AND POLYMYXIN B SULFATE: 500; 10000 OINTMENT OPHTHALMIC at 09:38

## 2020-01-01 RX ADMIN — PREDNISONE 30 MG: 20 TABLET ORAL at 09:39

## 2020-01-01 RX ADMIN — HEPARIN SODIUM AND DEXTROSE 18 UNITS/KG/HR: 10000; 5 INJECTION INTRAVENOUS at 18:10

## 2020-01-01 RX ADMIN — POLYMYXIN B SULFATE, TRIMETHOPRIM SULFATE 1 DROP: 10000; 1 SOLUTION/ DROPS OPHTHALMIC at 08:47

## 2020-01-01 RX ADMIN — METOPROLOL SUCCINATE 25 MG: 25 TABLET, FILM COATED, EXTENDED RELEASE ORAL at 08:58

## 2020-01-01 RX ADMIN — INSULIN LISPRO 2 UNITS: 100 INJECTION, SOLUTION INTRAVENOUS; SUBCUTANEOUS at 15:58

## 2020-01-01 RX ADMIN — ALBUTEROL SULFATE 2.5 MG: 2.5 SOLUTION RESPIRATORY (INHALATION) at 03:34

## 2020-01-01 RX ADMIN — OSELTAMIVIR PHOSPHATE 30 MG: 6 POWDER, FOR SUSPENSION ORAL at 20:10

## 2020-01-01 RX ADMIN — PANTOPRAZOLE SODIUM 40 MG: 40 INJECTION, POWDER, FOR SOLUTION INTRAVENOUS at 08:23

## 2020-01-01 RX ADMIN — DESMOPRESSIN ACETATE 40 MG: 0.2 TABLET ORAL at 21:06

## 2020-01-01 RX ADMIN — DOCUSATE SODIUM 100 MG: 50 LIQUID ORAL at 23:05

## 2020-01-01 RX ADMIN — INSULIN LISPRO 2 UNITS: 100 INJECTION, SOLUTION INTRAVENOUS; SUBCUTANEOUS at 08:34

## 2020-01-01 RX ADMIN — ALBUMIN (HUMAN) 25 G: 0.25 INJECTION, SOLUTION INTRAVENOUS at 15:47

## 2020-01-01 RX ADMIN — INSULIN LISPRO 2 UNITS: 100 INJECTION, SOLUTION INTRAVENOUS; SUBCUTANEOUS at 11:31

## 2020-01-01 RX ADMIN — Medication 100 MCG/HR: at 10:31

## 2020-01-01 RX ADMIN — SODIUM CHLORIDE 500 ML: 9 INJECTION, SOLUTION INTRAVENOUS at 10:34

## 2020-01-01 RX ADMIN — POLYMYXIN B SULFATE, TRIMETHOPRIM SULFATE 1 DROP: 10000; 1 SOLUTION/ DROPS OPHTHALMIC at 03:42

## 2020-01-01 RX ADMIN — ALBUTEROL SULFATE 15 MG: 5 SOLUTION RESPIRATORY (INHALATION) at 01:22

## 2020-01-01 RX ADMIN — Medication 10 ML: at 09:00

## 2020-01-01 RX ADMIN — PROPOFOL 17 MCG/KG/MIN: 10 INJECTION, EMULSION INTRAVENOUS at 15:42

## 2020-01-01 RX ADMIN — HEPARIN SODIUM 2710 UNITS: 1000 INJECTION INTRAVENOUS; SUBCUTANEOUS at 23:51

## 2020-01-01 RX ADMIN — IPRATROPIUM BROMIDE AND ALBUTEROL SULFATE 1 AMPULE: .5; 3 SOLUTION RESPIRATORY (INHALATION) at 16:23

## 2020-01-01 RX ADMIN — HEPARIN SODIUM 2710 UNITS: 1000 INJECTION INTRAVENOUS; SUBCUTANEOUS at 13:03

## 2020-01-01 RX ADMIN — Medication 1 DROP: at 14:26

## 2020-01-01 RX ADMIN — INSULIN LISPRO 2 UNITS: 100 INJECTION, SOLUTION INTRAVENOUS; SUBCUTANEOUS at 20:22

## 2020-01-01 RX ADMIN — BUMETANIDE 1.5 MG/HR: 0.25 INJECTION INTRAMUSCULAR; INTRAVENOUS at 01:49

## 2020-01-01 RX ADMIN — POLYMYXIN B SULFATE, TRIMETHOPRIM SULFATE 1 DROP: 10000; 1 SOLUTION/ DROPS OPHTHALMIC at 16:48

## 2020-01-01 RX ADMIN — IPRATROPIUM BROMIDE AND ALBUTEROL SULFATE 1 AMPULE: .5; 3 SOLUTION RESPIRATORY (INHALATION) at 15:34

## 2020-01-01 RX ADMIN — POLYMYXIN B SULFATE, TRIMETHOPRIM SULFATE 1 DROP: 10000; 1 SOLUTION/ DROPS OPHTHALMIC at 00:15

## 2020-01-01 RX ADMIN — HEPARIN SODIUM AND DEXTROSE 4 UNITS/KG/HR: 10000; 5 INJECTION INTRAVENOUS at 11:55

## 2020-01-01 RX ADMIN — PROPOFOL 10 MCG/KG/MIN: 10 INJECTION, EMULSION INTRAVENOUS at 13:14

## 2020-01-01 RX ADMIN — INSULIN LISPRO 3 UNITS: 100 INJECTION, SOLUTION INTRAVENOUS; SUBCUTANEOUS at 12:33

## 2020-01-01 RX ADMIN — Medication 12 MCG/MIN: at 15:34

## 2020-01-01 RX ADMIN — BUMETANIDE 2 MG/HR: 0.25 INJECTION INTRAMUSCULAR; INTRAVENOUS at 20:05

## 2020-01-01 RX ADMIN — Medication 10 ML: at 12:09

## 2020-01-01 RX ADMIN — DOCUSATE SODIUM 100 MG: 50 LIQUID ORAL at 20:55

## 2020-01-01 RX ADMIN — Medication 1 MG/HR: at 10:29

## 2020-01-01 RX ADMIN — INSULIN LISPRO 2 UNITS: 100 INJECTION, SOLUTION INTRAVENOUS; SUBCUTANEOUS at 08:13

## 2020-01-01 RX ADMIN — METHYLPREDNISOLONE SODIUM SUCCINATE 40 MG: 40 INJECTION, POWDER, FOR SOLUTION INTRAMUSCULAR; INTRAVENOUS at 02:27

## 2020-01-01 RX ADMIN — INSULIN LISPRO 2 UNITS: 100 INJECTION, SOLUTION INTRAVENOUS; SUBCUTANEOUS at 08:49

## 2020-01-01 RX ADMIN — METOPROLOL SUCCINATE 25 MG: 25 TABLET, FILM COATED, EXTENDED RELEASE ORAL at 08:49

## 2020-01-01 RX ADMIN — ASPIRIN 81 MG: 81 TABLET, CHEWABLE ORAL at 09:38

## 2020-01-01 RX ADMIN — Medication 125 MCG/HR: at 15:58

## 2020-01-01 RX ADMIN — DESMOPRESSIN ACETATE 40 MG: 0.2 TABLET ORAL at 20:04

## 2020-01-01 RX ADMIN — DESMOPRESSIN ACETATE 40 MG: 0.2 TABLET ORAL at 20:51

## 2020-01-01 RX ADMIN — MAGNESIUM SULFATE HEPTAHYDRATE 2 G: 1 INJECTION, SOLUTION INTRAVENOUS at 06:01

## 2020-01-01 RX ADMIN — ALBUTEROL SULFATE 2.5 MG: 2.5 SOLUTION RESPIRATORY (INHALATION) at 03:41

## 2020-01-01 RX ADMIN — ALBUTEROL SULFATE 2.5 MG: 2.5 SOLUTION RESPIRATORY (INHALATION) at 23:49

## 2020-01-01 RX ADMIN — ALBUTEROL SULFATE 2.5 MG: 2.5 SOLUTION RESPIRATORY (INHALATION) at 23:09

## 2020-01-01 RX ADMIN — INSULIN LISPRO 2 UNITS: 100 INJECTION, SOLUTION INTRAVENOUS; SUBCUTANEOUS at 09:08

## 2020-01-01 RX ADMIN — INSULIN LISPRO 4 UNITS: 100 INJECTION, SOLUTION INTRAVENOUS; SUBCUTANEOUS at 20:10

## 2020-01-01 RX ADMIN — POLYMYXIN B SULFATE, TRIMETHOPRIM SULFATE 1 DROP: 10000; 1 SOLUTION/ DROPS OPHTHALMIC at 16:40

## 2020-01-01 RX ADMIN — INSULIN LISPRO 2 UNITS: 100 INJECTION, SOLUTION INTRAVENOUS; SUBCUTANEOUS at 20:10

## 2020-01-01 RX ADMIN — PREDNISONE 40 MG: 20 TABLET ORAL at 07:43

## 2020-01-01 RX ADMIN — ALBUTEROL SULFATE 150 MG: 5 SOLUTION RESPIRATORY (INHALATION) at 03:59

## 2020-01-01 RX ADMIN — Medication 4 MCG/MIN: at 08:18

## 2020-01-01 RX ADMIN — MIDODRINE HYDROCHLORIDE 5 MG: 5 TABLET ORAL at 17:09

## 2020-01-01 RX ADMIN — INSULIN LISPRO 4 UNITS: 100 INJECTION, SOLUTION INTRAVENOUS; SUBCUTANEOUS at 12:21

## 2020-01-01 RX ADMIN — Medication 1 DROP: at 13:53

## 2020-01-01 RX ADMIN — IPRATROPIUM BROMIDE AND ALBUTEROL SULFATE 1 AMPULE: .5; 3 SOLUTION RESPIRATORY (INHALATION) at 08:13

## 2020-01-01 RX ADMIN — Medication 30 MG: at 08:03

## 2020-01-01 RX ADMIN — SODIUM CHLORIDE: 9 INJECTION, SOLUTION INTRAVENOUS at 19:30

## 2020-01-01 RX ADMIN — PHENYLEPHRINE HYDROCHLORIDE 115 MCG/MIN: 10 INJECTION INTRAVENOUS at 20:12

## 2020-01-01 RX ADMIN — Medication 1 DROP: at 08:29

## 2020-01-01 RX ADMIN — SODIUM BICARBONATE: 84 INJECTION, SOLUTION INTRAVENOUS at 23:55

## 2020-01-01 RX ADMIN — INSULIN LISPRO 2 UNITS: 100 INJECTION, SOLUTION INTRAVENOUS; SUBCUTANEOUS at 21:01

## 2020-01-01 RX ADMIN — POLYMYXIN B SULFATE, TRIMETHOPRIM SULFATE 1 DROP: 10000; 1 SOLUTION/ DROPS OPHTHALMIC at 04:26

## 2020-01-01 RX ADMIN — SODIUM CHLORIDE: 9 INJECTION, SOLUTION INTRAVENOUS at 05:43

## 2020-01-01 RX ADMIN — ALBUTEROL SULFATE 2.5 MG: 2.5 SOLUTION RESPIRATORY (INHALATION) at 03:37

## 2020-01-01 RX ADMIN — SODIUM CHLORIDE 1000 ML: 9 INJECTION, SOLUTION INTRAVENOUS at 17:09

## 2020-01-01 RX ADMIN — POTASSIUM CHLORIDE 20 MEQ: 29.8 INJECTION, SOLUTION INTRAVENOUS at 09:17

## 2020-01-01 RX ADMIN — POLYMYXIN B SULFATE, TRIMETHOPRIM SULFATE 1 DROP: 10000; 1 SOLUTION/ DROPS OPHTHALMIC at 21:47

## 2020-01-01 RX ADMIN — METOPROLOL TARTRATE 25 MG: 25 TABLET ORAL at 21:14

## 2020-01-01 RX ADMIN — POLYMYXIN B SULFATE, TRIMETHOPRIM SULFATE 1 DROP: 10000; 1 SOLUTION/ DROPS OPHTHALMIC at 20:04

## 2020-01-01 RX ADMIN — IPRATROPIUM BROMIDE AND ALBUTEROL SULFATE 1 AMPULE: .5; 3 SOLUTION RESPIRATORY (INHALATION) at 07:48

## 2020-01-01 RX ADMIN — FENTANYL CITRATE 25 MCG: 50 INJECTION, SOLUTION INTRAMUSCULAR; INTRAVENOUS at 07:50

## 2020-01-01 RX ADMIN — SODIUM CHLORIDE: 9 INJECTION, SOLUTION INTRAVENOUS at 23:49

## 2020-01-01 RX ADMIN — INSULIN LISPRO 2 UNITS: 100 INJECTION, SOLUTION INTRAVENOUS; SUBCUTANEOUS at 12:17

## 2020-01-01 RX ADMIN — OSELTAMIVIR PHOSPHATE 30 MG: 6 POWDER, FOR SUSPENSION ORAL at 09:11

## 2020-01-01 RX ADMIN — DESMOPRESSIN ACETATE 40 MG: 0.2 TABLET ORAL at 20:44

## 2020-01-01 RX ADMIN — HEPARIN SODIUM 5000 UNITS: 5000 INJECTION INTRAVENOUS; SUBCUTANEOUS at 14:16

## 2020-01-01 RX ADMIN — ASPIRIN 81 MG: 81 TABLET, CHEWABLE ORAL at 08:27

## 2020-01-01 RX ADMIN — Medication 1 DROP: at 08:05

## 2020-01-01 RX ADMIN — Medication 10 ML: at 08:56

## 2020-01-01 RX ADMIN — ASPIRIN 81 MG: 81 TABLET, CHEWABLE ORAL at 08:03

## 2020-01-01 RX ADMIN — POLYMYXIN B SULFATE, TRIMETHOPRIM SULFATE 1 DROP: 10000; 1 SOLUTION/ DROPS OPHTHALMIC at 12:54

## 2020-01-01 RX ADMIN — ALBUTEROL SULFATE 2.5 MG: 2.5 SOLUTION RESPIRATORY (INHALATION) at 03:26

## 2020-01-01 RX ADMIN — Medication 5 MG/HR: at 15:34

## 2020-01-01 RX ADMIN — IPRATROPIUM BROMIDE AND ALBUTEROL SULFATE 1 AMPULE: .5; 3 SOLUTION RESPIRATORY (INHALATION) at 11:29

## 2020-01-01 RX ADMIN — MIDODRINE HYDROCHLORIDE 5 MG: 5 TABLET ORAL at 08:59

## 2020-01-01 RX ADMIN — SODIUM CHLORIDE 250 ML: 9 INJECTION, SOLUTION INTRAVENOUS at 23:48

## 2020-01-01 RX ADMIN — IPRATROPIUM BROMIDE AND ALBUTEROL SULFATE 1 AMPULE: .5; 3 SOLUTION RESPIRATORY (INHALATION) at 19:49

## 2020-01-01 RX ADMIN — INSULIN LISPRO 2 UNITS: 100 INJECTION, SOLUTION INTRAVENOUS; SUBCUTANEOUS at 00:02

## 2020-01-01 RX ADMIN — SODIUM CHLORIDE: 9 INJECTION, SOLUTION INTRAVENOUS at 11:31

## 2020-01-01 RX ADMIN — ALBUTEROL SULFATE 2.5 MG: 2.5 SOLUTION RESPIRATORY (INHALATION) at 04:02

## 2020-01-01 RX ADMIN — IPRATROPIUM BROMIDE AND ALBUTEROL SULFATE 1 AMPULE: .5; 3 SOLUTION RESPIRATORY (INHALATION) at 07:58

## 2020-01-01 RX ADMIN — MIDODRINE HYDROCHLORIDE 5 MG: 5 TABLET ORAL at 16:20

## 2020-01-01 RX ADMIN — INSULIN LISPRO 2 UNITS: 100 INJECTION, SOLUTION INTRAVENOUS; SUBCUTANEOUS at 08:50

## 2020-01-01 RX ADMIN — PROPOFOL 15 MCG/KG/MIN: 10 INJECTION, EMULSION INTRAVENOUS at 05:39

## 2020-01-01 RX ADMIN — OSELTAMIVIR PHOSPHATE 30 MG: 6 POWDER, FOR SUSPENSION ORAL at 08:54

## 2020-01-01 RX ADMIN — ASPIRIN 81 MG: 81 TABLET, CHEWABLE ORAL at 08:58

## 2020-01-01 RX ADMIN — DESMOPRESSIN ACETATE 40 MG: 0.2 TABLET ORAL at 21:16

## 2020-01-01 RX ADMIN — PROPOFOL 5 MCG/KG/MIN: 10 INJECTION, EMULSION INTRAVENOUS at 13:40

## 2020-01-01 RX ADMIN — MIDODRINE HYDROCHLORIDE 5 MG: 5 TABLET ORAL at 12:18

## 2020-01-01 RX ADMIN — INSULIN LISPRO 2 UNITS: 100 INJECTION, SOLUTION INTRAVENOUS; SUBCUTANEOUS at 09:00

## 2020-01-01 RX ADMIN — ALBUTEROL SULFATE 2.5 MG: 2.5 SOLUTION RESPIRATORY (INHALATION) at 23:22

## 2020-01-01 RX ADMIN — METOPROLOL TARTRATE 25 MG: 25 TABLET ORAL at 09:21

## 2020-01-01 RX ADMIN — INSULIN LISPRO 6 UNITS: 100 INJECTION, SOLUTION INTRAVENOUS; SUBCUTANEOUS at 11:46

## 2020-01-01 RX ADMIN — METHYLPREDNISOLONE SODIUM SUCCINATE 40 MG: 40 INJECTION, POWDER, FOR SOLUTION INTRAMUSCULAR; INTRAVENOUS at 17:59

## 2020-01-01 RX ADMIN — INSULIN LISPRO 2 UNITS: 100 INJECTION, SOLUTION INTRAVENOUS; SUBCUTANEOUS at 17:02

## 2020-01-01 RX ADMIN — POLYMYXIN B SULFATE, TRIMETHOPRIM SULFATE 1 DROP: 10000; 1 SOLUTION/ DROPS OPHTHALMIC at 03:26

## 2020-01-01 RX ADMIN — OSELTAMIVIR PHOSPHATE 30 MG: 6 POWDER, FOR SUSPENSION ORAL at 12:35

## 2020-01-01 RX ADMIN — INSULIN LISPRO 3 UNITS: 100 INJECTION, SOLUTION INTRAVENOUS; SUBCUTANEOUS at 12:14

## 2020-01-01 RX ADMIN — HEPARIN SODIUM AND DEXTROSE 18 UNITS/KG/HR: 10000; 5 INJECTION INTRAVENOUS at 00:10

## 2020-01-01 RX ADMIN — DOCUSATE SODIUM 100 MG: 50 LIQUID ORAL at 08:49

## 2020-01-01 RX ADMIN — Medication 5 MG/HR: at 05:21

## 2020-01-01 RX ADMIN — HEPARIN SODIUM 2710 UNITS: 1000 INJECTION INTRAVENOUS; SUBCUTANEOUS at 12:53

## 2020-01-01 RX ADMIN — INSULIN LISPRO 2 UNITS: 100 INJECTION, SOLUTION INTRAVENOUS; SUBCUTANEOUS at 21:20

## 2020-01-01 RX ADMIN — ASPIRIN 81 MG: 81 TABLET, CHEWABLE ORAL at 09:02

## 2020-01-01 RX ADMIN — BACITRACIN ZINC AND POLYMYXIN B SULFATE: 500; 10000 OINTMENT OPHTHALMIC at 17:26

## 2020-01-01 RX ADMIN — IPRATROPIUM BROMIDE AND ALBUTEROL SULFATE 1 AMPULE: .5; 3 SOLUTION RESPIRATORY (INHALATION) at 19:44

## 2020-01-01 RX ADMIN — IPRATROPIUM BROMIDE AND ALBUTEROL SULFATE 1 AMPULE: .5; 3 SOLUTION RESPIRATORY (INHALATION) at 12:07

## 2020-01-01 RX ADMIN — FENTANYL CITRATE 50 MCG: 50 INJECTION, SOLUTION INTRAMUSCULAR; INTRAVENOUS at 21:09

## 2020-01-01 RX ADMIN — ASPIRIN 81 MG: 81 TABLET, CHEWABLE ORAL at 08:34

## 2020-01-01 RX ADMIN — MIDODRINE HYDROCHLORIDE 5 MG: 5 TABLET ORAL at 11:46

## 2020-01-01 RX ADMIN — METOPROLOL TARTRATE 25 MG: 25 TABLET ORAL at 21:19

## 2020-01-01 RX ADMIN — Medication 10 ML: at 20:54

## 2020-01-01 RX ADMIN — FENTANYL CITRATE 25 MCG: 50 INJECTION, SOLUTION INTRAMUSCULAR; INTRAVENOUS at 18:05

## 2020-01-01 RX ADMIN — PANTOPRAZOLE SODIUM 40 MG: 40 INJECTION, POWDER, FOR SOLUTION INTRAVENOUS at 17:27

## 2020-01-01 RX ADMIN — FENTANYL CITRATE 25 MCG: 50 INJECTION, SOLUTION INTRAMUSCULAR; INTRAVENOUS at 10:17

## 2020-01-01 RX ADMIN — FENTANYL CITRATE 50 MCG: 50 INJECTION, SOLUTION INTRAMUSCULAR; INTRAVENOUS at 16:54

## 2020-01-01 RX ADMIN — METOPROLOL TARTRATE 25 MG: 25 TABLET ORAL at 21:06

## 2020-01-01 RX ADMIN — MAGNESIUM SULFATE HEPTAHYDRATE 1 G: 1 INJECTION, SOLUTION INTRAVENOUS at 06:03

## 2020-01-01 RX ADMIN — CISATRACURIUM BESYLATE 10 MG: 200 INJECTION INTRAVENOUS at 01:16

## 2020-01-01 RX ADMIN — IPRATROPIUM BROMIDE AND ALBUTEROL SULFATE 1 AMPULE: .5; 3 SOLUTION RESPIRATORY (INHALATION) at 08:33

## 2020-01-01 RX ADMIN — SODIUM BICARBONATE: 84 INJECTION, SOLUTION INTRAVENOUS at 10:23

## 2020-01-01 RX ADMIN — LORAZEPAM 1 MG: 2 INJECTION INTRAMUSCULAR; INTRAVENOUS at 12:03

## 2020-01-01 RX ADMIN — ALBUTEROL SULFATE 2.5 MG: 2.5 SOLUTION RESPIRATORY (INHALATION) at 23:08

## 2020-01-01 RX ADMIN — MIDODRINE HYDROCHLORIDE 5 MG: 5 TABLET ORAL at 13:02

## 2020-01-01 RX ADMIN — HEPARIN SODIUM 3940 UNITS: 1000 INJECTION INTRAVENOUS; SUBCUTANEOUS at 22:17

## 2020-01-01 RX ADMIN — PREDNISONE 40 MG: 20 TABLET ORAL at 08:59

## 2020-01-01 RX ADMIN — Medication 30 MG: at 17:05

## 2020-01-01 RX ADMIN — Medication 10 ML: at 21:14

## 2020-01-01 RX ADMIN — POLYETHYLENE GLYCOL 3350 17 G: 17 POWDER, FOR SOLUTION ORAL at 09:25

## 2020-01-01 RX ADMIN — POLYMYXIN B SULFATE, TRIMETHOPRIM SULFATE 1 DROP: 10000; 1 SOLUTION/ DROPS OPHTHALMIC at 23:32

## 2020-01-01 RX ADMIN — FENTANYL CITRATE 25 MCG: 50 INJECTION, SOLUTION INTRAMUSCULAR; INTRAVENOUS at 06:40

## 2020-01-01 RX ADMIN — INSULIN LISPRO 4 UNITS: 100 INJECTION, SOLUTION INTRAVENOUS; SUBCUTANEOUS at 21:14

## 2020-01-01 RX ADMIN — Medication 1 DROP: at 21:08

## 2020-01-01 RX ADMIN — POLYMYXIN B SULFATE, TRIMETHOPRIM SULFATE 1 DROP: 10000; 1 SOLUTION/ DROPS OPHTHALMIC at 20:50

## 2020-01-01 RX ADMIN — PHENYLEPHRINE HYDROCHLORIDE 75 MCG/MIN: 10 INJECTION INTRAVENOUS at 06:03

## 2020-01-01 RX ADMIN — ALBUTEROL SULFATE 2.5 MG: 2.5 SOLUTION RESPIRATORY (INHALATION) at 23:18

## 2020-01-01 RX ADMIN — BACITRACIN ZINC AND POLYMYXIN B SULFATE: 500; 10000 OINTMENT OPHTHALMIC at 16:48

## 2020-01-01 RX ADMIN — DESMOPRESSIN ACETATE 40 MG: 0.2 TABLET ORAL at 19:52

## 2020-01-01 RX ADMIN — IPRATROPIUM BROMIDE AND ALBUTEROL SULFATE 1 AMPULE: .5; 3 SOLUTION RESPIRATORY (INHALATION) at 15:57

## 2020-01-01 RX ADMIN — HEPARIN SODIUM AND DEXTROSE 18 UNITS/KG/HR: 10000; 5 INJECTION INTRAVENOUS at 17:15

## 2020-01-01 RX ADMIN — INSULIN LISPRO 2 UNITS: 100 INJECTION, SOLUTION INTRAVENOUS; SUBCUTANEOUS at 23:55

## 2020-01-01 RX ADMIN — DESMOPRESSIN ACETATE 40 MG: 0.2 TABLET ORAL at 21:14

## 2020-01-01 RX ADMIN — DOCUSATE SODIUM 100 MG: 50 LIQUID ORAL at 08:03

## 2020-01-01 RX ADMIN — IPRATROPIUM BROMIDE AND ALBUTEROL SULFATE 1 AMPULE: .5; 3 SOLUTION RESPIRATORY (INHALATION) at 20:14

## 2020-01-01 RX ADMIN — Medication 1 DROP: at 20:04

## 2020-01-01 RX ADMIN — Medication 1 DROP: at 10:59

## 2020-01-01 RX ADMIN — IPRATROPIUM BROMIDE AND ALBUTEROL SULFATE 1 AMPULE: .5; 3 SOLUTION RESPIRATORY (INHALATION) at 12:29

## 2020-01-01 RX ADMIN — POLYMYXIN B SULFATE, TRIMETHOPRIM SULFATE 1 DROP: 10000; 1 SOLUTION/ DROPS OPHTHALMIC at 00:11

## 2020-01-01 RX ADMIN — Medication 10 ML: at 08:24

## 2020-01-01 RX ADMIN — IPRATROPIUM BROMIDE AND ALBUTEROL SULFATE 1 AMPULE: .5; 3 SOLUTION RESPIRATORY (INHALATION) at 15:51

## 2020-01-01 RX ADMIN — METHYLPREDNISOLONE SODIUM SUCCINATE 40 MG: 40 INJECTION, POWDER, FOR SOLUTION INTRAMUSCULAR; INTRAVENOUS at 11:31

## 2020-01-01 RX ADMIN — BACITRACIN ZINC AND POLYMYXIN B SULFATE: 500; 10000 OINTMENT OPHTHALMIC at 13:48

## 2020-01-01 RX ADMIN — INSULIN LISPRO 2 UNITS: 100 INJECTION, SOLUTION INTRAVENOUS; SUBCUTANEOUS at 21:21

## 2020-01-01 RX ADMIN — POLYMYXIN B SULFATE, TRIMETHOPRIM SULFATE 1 DROP: 10000; 1 SOLUTION/ DROPS OPHTHALMIC at 08:18

## 2020-01-01 RX ADMIN — POTASSIUM CHLORIDE 20 MEQ: 29.8 INJECTION, SOLUTION INTRAVENOUS at 10:30

## 2020-01-01 RX ADMIN — INSULIN LISPRO 2 UNITS: 100 INJECTION, SOLUTION INTRAVENOUS; SUBCUTANEOUS at 00:10

## 2020-01-01 RX ADMIN — POLYMYXIN B SULFATE, TRIMETHOPRIM SULFATE 1 DROP: 10000; 1 SOLUTION/ DROPS OPHTHALMIC at 08:58

## 2020-01-01 RX ADMIN — POLYMYXIN B SULFATE, TRIMETHOPRIM SULFATE 1 DROP: 10000; 1 SOLUTION/ DROPS OPHTHALMIC at 16:20

## 2020-01-01 RX ADMIN — BACITRACIN ZINC AND POLYMYXIN B SULFATE: 500; 10000 OINTMENT OPHTHALMIC at 17:09

## 2020-01-01 RX ADMIN — INSULIN LISPRO 6 UNITS: 100 INJECTION, SOLUTION INTRAVENOUS; SUBCUTANEOUS at 08:08

## 2020-01-01 RX ADMIN — INSULIN LISPRO 2 UNITS: 100 INJECTION, SOLUTION INTRAVENOUS; SUBCUTANEOUS at 20:23

## 2020-01-01 RX ADMIN — HEPARIN SODIUM 5420 UNITS: 1000 INJECTION INTRAVENOUS; SUBCUTANEOUS at 18:17

## 2020-01-01 RX ADMIN — Medication 5 MG/HR: at 01:48

## 2020-01-01 RX ADMIN — INSULIN LISPRO 4 UNITS: 100 INJECTION, SOLUTION INTRAVENOUS; SUBCUTANEOUS at 23:57

## 2020-01-01 RX ADMIN — MIDODRINE HYDROCHLORIDE 5 MG: 5 TABLET ORAL at 16:41

## 2020-01-01 RX ADMIN — Medication 3 MG/HR: at 13:37

## 2020-01-01 RX ADMIN — Medication 1 DROP: at 21:20

## 2020-01-01 RX ADMIN — SODIUM BICARBONATE: 84 INJECTION, SOLUTION INTRAVENOUS at 02:15

## 2020-01-01 RX ADMIN — PREDNISONE 40 MG: 20 TABLET ORAL at 09:20

## 2020-01-01 RX ADMIN — FENTANYL CITRATE 50 MCG: 50 INJECTION, SOLUTION INTRAMUSCULAR; INTRAVENOUS at 20:20

## 2020-01-01 RX ADMIN — DOCUSATE SODIUM 100 MG: 50 LIQUID ORAL at 08:27

## 2020-01-01 RX ADMIN — ALBUTEROL SULFATE 2.5 MG: 2.5 SOLUTION RESPIRATORY (INHALATION) at 22:54

## 2020-01-01 RX ADMIN — OSELTAMIVIR PHOSPHATE 75 MG: 75 CAPSULE ORAL at 20:49

## 2020-01-01 RX ADMIN — BUMETANIDE 2 MG: 0.25 INJECTION INTRAMUSCULAR; INTRAVENOUS at 20:50

## 2020-01-01 RX ADMIN — INSULIN LISPRO 4 UNITS: 100 INJECTION, SOLUTION INTRAVENOUS; SUBCUTANEOUS at 12:06

## 2020-01-01 RX ADMIN — POLYMYXIN B SULFATE, TRIMETHOPRIM SULFATE 1 DROP: 10000; 1 SOLUTION/ DROPS OPHTHALMIC at 20:44

## 2020-01-01 RX ADMIN — Medication 10 ML: at 20:04

## 2020-01-01 RX ADMIN — BUMETANIDE 2 MG: 0.25 INJECTION INTRAMUSCULAR; INTRAVENOUS at 08:44

## 2020-01-01 RX ADMIN — ALBUTEROL SULFATE 15 MG/HR: 5 SOLUTION RESPIRATORY (INHALATION) at 21:11

## 2020-01-01 RX ADMIN — DESMOPRESSIN ACETATE 40 MG: 0.2 TABLET ORAL at 20:19

## 2020-01-01 RX ADMIN — POLYMYXIN B SULFATE, TRIMETHOPRIM SULFATE 1 DROP: 10000; 1 SOLUTION/ DROPS OPHTHALMIC at 04:01

## 2020-01-01 RX ADMIN — Medication 2 MCG/MIN: at 17:51

## 2020-01-01 RX ADMIN — INSULIN LISPRO 2 UNITS: 100 INJECTION, SOLUTION INTRAVENOUS; SUBCUTANEOUS at 01:42

## 2020-01-01 RX ADMIN — PANTOPRAZOLE SODIUM 40 MG: 40 INJECTION, POWDER, FOR SOLUTION INTRAVENOUS at 09:11

## 2020-01-01 RX ADMIN — MIDODRINE HYDROCHLORIDE 5 MG: 5 TABLET ORAL at 12:35

## 2020-01-01 RX ADMIN — POTASSIUM CHLORIDE 20 MEQ: 29.8 INJECTION, SOLUTION INTRAVENOUS at 06:34

## 2020-01-01 RX ADMIN — DESMOPRESSIN ACETATE 40 MG: 0.2 TABLET ORAL at 19:51

## 2020-01-01 RX ADMIN — Medication 10 ML: at 09:19

## 2020-01-01 RX ADMIN — SODIUM CHLORIDE: 9 INJECTION, SOLUTION INTRAVENOUS at 05:39

## 2020-01-01 RX ADMIN — Medication 50 MCG/HR: at 10:58

## 2020-01-01 RX ADMIN — MIDODRINE HYDROCHLORIDE 5 MG: 5 TABLET ORAL at 16:33

## 2020-01-01 RX ADMIN — OSELTAMIVIR PHOSPHATE 30 MG: 6 POWDER, FOR SUSPENSION ORAL at 09:47

## 2020-01-01 RX ADMIN — ASPIRIN 81 MG: 81 TABLET, CHEWABLE ORAL at 08:16

## 2020-01-01 RX ADMIN — INSULIN LISPRO 2 UNITS: 100 INJECTION, SOLUTION INTRAVENOUS; SUBCUTANEOUS at 16:23

## 2020-01-01 RX ADMIN — BUMETANIDE 2 MG/HR: 0.25 INJECTION INTRAMUSCULAR; INTRAVENOUS at 10:16

## 2020-01-01 RX ADMIN — POLYMYXIN B SULFATE, TRIMETHOPRIM SULFATE 1 DROP: 10000; 1 SOLUTION/ DROPS OPHTHALMIC at 16:14

## 2020-01-01 RX ADMIN — POLYMYXIN B SULFATE, TRIMETHOPRIM SULFATE 1 DROP: 10000; 1 SOLUTION/ DROPS OPHTHALMIC at 12:26

## 2020-01-01 RX ADMIN — POLYMYXIN B SULFATE, TRIMETHOPRIM SULFATE 1 DROP: 10000; 1 SOLUTION/ DROPS OPHTHALMIC at 20:43

## 2020-01-01 RX ADMIN — BUMETANIDE 2 MG/HR: 0.25 INJECTION INTRAMUSCULAR; INTRAVENOUS at 02:39

## 2020-01-01 RX ADMIN — Medication 10 ML: at 08:29

## 2020-01-01 RX ADMIN — CISATRACURIUM BESYLATE 2.5 MCG/KG/MIN: 10 INJECTION, SOLUTION INTRAVENOUS at 10:40

## 2020-01-01 RX ADMIN — CISATRACURIUM BESYLATE 3 MCG/KG/MIN: 10 INJECTION, SOLUTION INTRAVENOUS at 04:43

## 2020-01-01 RX ADMIN — Medication 30 MG: at 08:53

## 2020-01-01 RX ADMIN — METHYLPREDNISOLONE SODIUM SUCCINATE 40 MG: 40 INJECTION, POWDER, FOR SOLUTION INTRAMUSCULAR; INTRAVENOUS at 16:34

## 2020-01-01 RX ADMIN — FENTANYL CITRATE 25 MCG: 50 INJECTION, SOLUTION INTRAMUSCULAR; INTRAVENOUS at 17:27

## 2020-01-01 RX ADMIN — HEPARIN SODIUM AND DEXTROSE 18 UNITS/KG/HR: 10000; 5 INJECTION INTRAVENOUS at 17:11

## 2020-01-01 RX ADMIN — IPRATROPIUM BROMIDE AND ALBUTEROL SULFATE 1 AMPULE: .5; 3 SOLUTION RESPIRATORY (INHALATION) at 08:32

## 2020-01-01 RX ADMIN — Medication 10 ML: at 20:21

## 2020-01-01 RX ADMIN — IPRATROPIUM BROMIDE AND ALBUTEROL SULFATE 1 AMPULE: .5; 3 SOLUTION RESPIRATORY (INHALATION) at 15:35

## 2020-01-01 RX ADMIN — PANTOPRAZOLE SODIUM 40 MG: 40 INJECTION, POWDER, FOR SOLUTION INTRAVENOUS at 08:35

## 2020-01-01 RX ADMIN — HEPARIN SODIUM 2710 UNITS: 1000 INJECTION INTRAVENOUS; SUBCUTANEOUS at 13:47

## 2020-01-01 RX ADMIN — POLYMYXIN B SULFATE, TRIMETHOPRIM SULFATE 1 DROP: 10000; 1 SOLUTION/ DROPS OPHTHALMIC at 12:09

## 2020-01-01 RX ADMIN — Medication 100 MCG/HR: at 07:50

## 2020-01-01 RX ADMIN — INSULIN LISPRO 2 UNITS: 100 INJECTION, SOLUTION INTRAVENOUS; SUBCUTANEOUS at 16:10

## 2020-01-01 RX ADMIN — INSULIN LISPRO 3 UNITS: 100 INJECTION, SOLUTION INTRAVENOUS; SUBCUTANEOUS at 16:54

## 2020-01-01 RX ADMIN — POLYMYXIN B SULFATE, TRIMETHOPRIM SULFATE 1 DROP: 10000; 1 SOLUTION/ DROPS OPHTHALMIC at 05:17

## 2020-01-01 RX ADMIN — INSULIN LISPRO 2 UNITS: 100 INJECTION, SOLUTION INTRAVENOUS; SUBCUTANEOUS at 04:21

## 2020-01-01 RX ADMIN — DOCUSATE SODIUM 100 MG: 50 LIQUID ORAL at 20:07

## 2020-01-01 RX ADMIN — FENTANYL CITRATE 25 MCG: 50 INJECTION, SOLUTION INTRAMUSCULAR; INTRAVENOUS at 10:11

## 2020-01-01 RX ADMIN — Medication 10 ML: at 21:19

## 2020-01-01 RX ADMIN — HEPARIN SODIUM 5000 UNITS: 5000 INJECTION INTRAVENOUS; SUBCUTANEOUS at 14:26

## 2020-01-01 RX ADMIN — IPRATROPIUM BROMIDE AND ALBUTEROL SULFATE 1 AMPULE: .5; 3 SOLUTION RESPIRATORY (INHALATION) at 15:10

## 2020-01-01 RX ADMIN — FENTANYL CITRATE 50 MCG: 50 INJECTION, SOLUTION INTRAMUSCULAR; INTRAVENOUS at 10:44

## 2020-01-01 RX ADMIN — INSULIN LISPRO 2 UNITS: 100 INJECTION, SOLUTION INTRAVENOUS; SUBCUTANEOUS at 04:19

## 2020-01-01 RX ADMIN — IPRATROPIUM BROMIDE AND ALBUTEROL SULFATE 1 AMPULE: .5; 3 SOLUTION RESPIRATORY (INHALATION) at 11:45

## 2020-01-01 RX ADMIN — IPRATROPIUM BROMIDE AND ALBUTEROL SULFATE 1 AMPULE: .5; 3 SOLUTION RESPIRATORY (INHALATION) at 11:42

## 2020-01-01 RX ADMIN — INSULIN LISPRO 2 UNITS: 100 INJECTION, SOLUTION INTRAVENOUS; SUBCUTANEOUS at 03:58

## 2020-01-01 RX ADMIN — MIDODRINE HYDROCHLORIDE 5 MG: 5 TABLET ORAL at 08:49

## 2020-01-01 RX ADMIN — INSULIN LISPRO 2 UNITS: 100 INJECTION, SOLUTION INTRAVENOUS; SUBCUTANEOUS at 08:59

## 2020-01-01 RX ADMIN — BUMETANIDE 1 MG/HR: 0.25 INJECTION INTRAMUSCULAR; INTRAVENOUS at 15:21

## 2020-01-01 RX ADMIN — METOPROLOL TARTRATE 25 MG: 25 TABLET ORAL at 20:42

## 2020-01-01 RX ADMIN — DOCUSATE SODIUM 100 MG: 50 LIQUID ORAL at 20:09

## 2020-01-01 RX ADMIN — Medication 10 ML: at 20:07

## 2020-01-01 RX ADMIN — ASPIRIN 81 MG: 81 TABLET, CHEWABLE ORAL at 07:43

## 2020-01-01 RX ADMIN — ALBUTEROL SULFATE 15 MG/HR: 5 SOLUTION RESPIRATORY (INHALATION) at 22:57

## 2020-01-01 RX ADMIN — INSULIN LISPRO 2 UNITS: 100 INJECTION, SOLUTION INTRAVENOUS; SUBCUTANEOUS at 23:34

## 2020-01-01 RX ADMIN — OSELTAMIVIR PHOSPHATE 30 MG: 6 POWDER, FOR SUSPENSION ORAL at 20:22

## 2020-01-01 RX ADMIN — INSULIN LISPRO 2 UNITS: 100 INJECTION, SOLUTION INTRAVENOUS; SUBCUTANEOUS at 23:50

## 2020-01-01 RX ADMIN — BUMETANIDE 1.5 MG/HR: 0.25 INJECTION INTRAMUSCULAR; INTRAVENOUS at 16:32

## 2020-01-01 RX ADMIN — ALBUTEROL SULFATE 2.5 MG: 2.5 SOLUTION RESPIRATORY (INHALATION) at 23:25

## 2020-01-01 RX ADMIN — ALBUTEROL SULFATE 15 MG/HR: 2.5 SOLUTION RESPIRATORY (INHALATION) at 14:26

## 2020-01-01 RX ADMIN — IPRATROPIUM BROMIDE AND ALBUTEROL SULFATE 1 AMPULE: .5; 3 SOLUTION RESPIRATORY (INHALATION) at 20:27

## 2020-01-01 RX ADMIN — POLYMYXIN B SULFATE, TRIMETHOPRIM SULFATE 1 DROP: 10000; 1 SOLUTION/ DROPS OPHTHALMIC at 16:35

## 2020-01-01 RX ADMIN — BUMETANIDE 2 MG/HR: 0.25 INJECTION INTRAMUSCULAR; INTRAVENOUS at 07:38

## 2020-01-01 RX ADMIN — IPRATROPIUM BROMIDE AND ALBUTEROL SULFATE 1 AMPULE: .5; 3 SOLUTION RESPIRATORY (INHALATION) at 08:46

## 2020-01-01 RX ADMIN — ALBUTEROL SULFATE 2.5 MG: 2.5 SOLUTION RESPIRATORY (INHALATION) at 00:17

## 2020-01-01 RX ADMIN — FENTANYL CITRATE 25 MCG: 50 INJECTION, SOLUTION INTRAMUSCULAR; INTRAVENOUS at 12:34

## 2020-01-01 RX ADMIN — PREDNISONE 40 MG: 20 TABLET ORAL at 08:50

## 2020-01-01 RX ADMIN — SODIUM CHLORIDE: 9 INJECTION, SOLUTION INTRAVENOUS at 23:45

## 2020-01-01 RX ADMIN — IPRATROPIUM BROMIDE AND ALBUTEROL SULFATE 1 AMPULE: .5; 3 SOLUTION RESPIRATORY (INHALATION) at 12:04

## 2020-01-01 RX ADMIN — POLYMYXIN B SULFATE, TRIMETHOPRIM SULFATE 1 DROP: 10000; 1 SOLUTION/ DROPS OPHTHALMIC at 09:20

## 2020-01-01 RX ADMIN — METOPROLOL TARTRATE 25 MG: 25 TABLET ORAL at 08:03

## 2020-01-01 RX ADMIN — SODIUM CHLORIDE 5 ML/HR: 9 INJECTION, SOLUTION INTRAVENOUS at 17:11

## 2020-01-01 RX ADMIN — DILTIAZEM HYDROCHLORIDE 10 MG: 5 INJECTION INTRAVENOUS at 10:52

## 2020-01-01 RX ADMIN — POLYMYXIN B SULFATE, TRIMETHOPRIM SULFATE 1 DROP: 10000; 1 SOLUTION/ DROPS OPHTHALMIC at 16:22

## 2020-01-01 RX ADMIN — DOCUSATE SODIUM 100 MG: 50 LIQUID ORAL at 21:11

## 2020-01-01 RX ADMIN — HEPARIN SODIUM AND DEXTROSE 6 UNITS/KG/HR: 10000; 5 INJECTION INTRAVENOUS at 18:58

## 2020-01-01 RX ADMIN — POLYMYXIN B SULFATE, TRIMETHOPRIM SULFATE 1 DROP: 10000; 1 SOLUTION/ DROPS OPHTHALMIC at 23:47

## 2020-01-01 RX ADMIN — HEPARIN SODIUM 2710 UNITS: 1000 INJECTION INTRAVENOUS; SUBCUTANEOUS at 05:42

## 2020-01-01 RX ADMIN — ALBUTEROL SULFATE 2.5 MG: 2.5 SOLUTION RESPIRATORY (INHALATION) at 22:39

## 2020-01-01 RX ADMIN — POLYMYXIN B SULFATE, TRIMETHOPRIM SULFATE 1 DROP: 10000; 1 SOLUTION/ DROPS OPHTHALMIC at 04:03

## 2020-01-01 RX ADMIN — MIDODRINE HYDROCHLORIDE 5 MG: 5 TABLET ORAL at 17:00

## 2020-01-01 RX ADMIN — CISATRACURIUM BESYLATE 4 MCG/KG/MIN: 10 INJECTION, SOLUTION INTRAVENOUS at 17:30

## 2020-01-01 RX ADMIN — IPRATROPIUM BROMIDE AND ALBUTEROL SULFATE 1 AMPULE: .5; 3 SOLUTION RESPIRATORY (INHALATION) at 21:41

## 2020-01-01 RX ADMIN — POLYMYXIN B SULFATE, TRIMETHOPRIM SULFATE 1 DROP: 10000; 1 SOLUTION/ DROPS OPHTHALMIC at 16:30

## 2020-01-01 RX ADMIN — ASPIRIN 81 MG: 81 TABLET, CHEWABLE ORAL at 09:18

## 2020-01-01 RX ADMIN — FENTANYL CITRATE 50 MCG: 50 INJECTION, SOLUTION INTRAMUSCULAR; INTRAVENOUS at 21:45

## 2020-01-01 RX ADMIN — ALBUTEROL SULFATE 2.5 MG: 2.5 SOLUTION RESPIRATORY (INHALATION) at 03:19

## 2020-01-01 RX ADMIN — INSULIN LISPRO 2 UNITS: 100 INJECTION, SOLUTION INTRAVENOUS; SUBCUTANEOUS at 01:52

## 2020-01-01 RX ADMIN — SODIUM CHLORIDE: 9 INJECTION, SOLUTION INTRAVENOUS at 02:04

## 2020-01-01 RX ADMIN — INSULIN LISPRO 4 UNITS: 100 INJECTION, SOLUTION INTRAVENOUS; SUBCUTANEOUS at 16:15

## 2020-01-01 RX ADMIN — ALBUTEROL SULFATE 2.5 MG: 2.5 SOLUTION RESPIRATORY (INHALATION) at 02:57

## 2020-01-01 RX ADMIN — ASPIRIN 81 MG: 81 TABLET, CHEWABLE ORAL at 08:14

## 2020-01-01 SDOH — HEALTH STABILITY: MENTAL HEALTH: HOW OFTEN DO YOU HAVE A DRINK CONTAINING ALCOHOL?: NEVER

## 2020-01-01 ASSESSMENT — PULMONARY FUNCTION TESTS
PIF_VALUE: 28
PIF_VALUE: 26
PIF_VALUE: 27
PIF_VALUE: 31
PIF_VALUE: 27
PIF_VALUE: 26
PIF_VALUE: 29
PIF_VALUE: 26
PIF_VALUE: 32
PIF_VALUE: 32
PIF_VALUE: 29
PIF_VALUE: 27
PIF_VALUE: 26
PIF_VALUE: 30
PIF_VALUE: 31
PIF_VALUE: 32
PIF_VALUE: 32
PIF_VALUE: 29
PIF_VALUE: 31
PIF_VALUE: 16
PIF_VALUE: 26
PIF_VALUE: 27
PIF_VALUE: 26
PIF_VALUE: 32
PIF_VALUE: 31
PIF_VALUE: 27
PIF_VALUE: 26
PIF_VALUE: 26
PIF_VALUE: 32
PIF_VALUE: 32
PIF_VALUE: 30
PIF_VALUE: 25
PIF_VALUE: 27
PIF_VALUE: 26
PIF_VALUE: 27
PIF_VALUE: 26
PIF_VALUE: 32
PIF_VALUE: 30
PIF_VALUE: 32
PIF_VALUE: 31
PIF_VALUE: 31
PIF_VALUE: 26
PIF_VALUE: 31
PIF_VALUE: 32
PIF_VALUE: 30
PIF_VALUE: 29
PIF_VALUE: 26
PIF_VALUE: 30
PIF_VALUE: 31
PIF_VALUE: 26
PIF_VALUE: 30
PIF_VALUE: 31
PIF_VALUE: 30
PIF_VALUE: 29
PIF_VALUE: 29
PIF_VALUE: 27
PIF_VALUE: 26
PIF_VALUE: 27
PIF_VALUE: 26
PIF_VALUE: 32
PIF_VALUE: 26
PIF_VALUE: 32
PIF_VALUE: 27
PIF_VALUE: 27
PIF_VALUE: 26
PIF_VALUE: 30
PIF_VALUE: 29
PIF_VALUE: 26
PIF_VALUE: 32
PIF_VALUE: 29
PIF_VALUE: 26
PIF_VALUE: 26
PIF_VALUE: 30
PIF_VALUE: 26
PIF_VALUE: 29
PIF_VALUE: 27
PIF_VALUE: 28
PIF_VALUE: 27
PIF_VALUE: 26
PIF_VALUE: 26
PIF_VALUE: 24
PIF_VALUE: 29
PIF_VALUE: 33
PIF_VALUE: 30
PIF_VALUE: 30
PIF_VALUE: 29
PIF_VALUE: 26
PIF_VALUE: 30
PIF_VALUE: 27
PIF_VALUE: 31
PIF_VALUE: 31
PIF_VALUE: 30
PIF_VALUE: 27
PIF_VALUE: 31
PIF_VALUE: 29
PIF_VALUE: 28
PIF_VALUE: 34
PIF_VALUE: 31
PIF_VALUE: 28
PIF_VALUE: 26
PIF_VALUE: 32
PIF_VALUE: 31
PIF_VALUE: 27
PIF_VALUE: 33
PIF_VALUE: 31
PIF_VALUE: 26
PIF_VALUE: 34
PIF_VALUE: 31
PIF_VALUE: 30
PIF_VALUE: 21
PIF_VALUE: 29
PIF_VALUE: 31
PIF_VALUE: 31
PIF_VALUE: 28
PIF_VALUE: 26
PIF_VALUE: 30
PIF_VALUE: 31
PIF_VALUE: 31
PIF_VALUE: 27
PIF_VALUE: 21
PIF_VALUE: 26
PIF_VALUE: 26
PIF_VALUE: 28
PIF_VALUE: 27
PIF_VALUE: 27
PIF_VALUE: 28
PIF_VALUE: 27
PIF_VALUE: 30
PIF_VALUE: 26
PIF_VALUE: 30
PIF_VALUE: 32
PIF_VALUE: 29
PIF_VALUE: 30
PIF_VALUE: 26
PIF_VALUE: 31
PIF_VALUE: 27
PIF_VALUE: 32
PIF_VALUE: 31
PIF_VALUE: 32
PIF_VALUE: 24
PIF_VALUE: 32
PIF_VALUE: 28
PIF_VALUE: 27
PIF_VALUE: 28
PIF_VALUE: 33
PIF_VALUE: 26
PIF_VALUE: 29
PIF_VALUE: 28
PIF_VALUE: 29
PIF_VALUE: 26
PIF_VALUE: 34
PIF_VALUE: 27
PIF_VALUE: 28
PIF_VALUE: 36
PIF_VALUE: 28
PIF_VALUE: 31
PIF_VALUE: 26
PIF_VALUE: 30
PIF_VALUE: 31
PIF_VALUE: 27
PIF_VALUE: 33
PIF_VALUE: 30
PIF_VALUE: 28
PIF_VALUE: 30
PIF_VALUE: 30
PIF_VALUE: 27
PIF_VALUE: 26
PIF_VALUE: 26
PIF_VALUE: 30
PIF_VALUE: 31
PIF_VALUE: 30
PIF_VALUE: 32
PIF_VALUE: 30
PIF_VALUE: 28
PIF_VALUE: 27
PIF_VALUE: 31
PIF_VALUE: 26
PIF_VALUE: 31
PIF_VALUE: 27
PIF_VALUE: 29
PIF_VALUE: 31
PIF_VALUE: 28
PIF_VALUE: 29
PIF_VALUE: 29
PIF_VALUE: 32
PIF_VALUE: 27
PIF_VALUE: 27
PIF_VALUE: 28
PIF_VALUE: 28
PIF_VALUE: 27
PIF_VALUE: 32
PIF_VALUE: 29
PIF_VALUE: 34
PIF_VALUE: 28
PIF_VALUE: 27
PIF_VALUE: 26
PIF_VALUE: 22
PIF_VALUE: 26
PIF_VALUE: 26
PIF_VALUE: 31
PIF_VALUE: 28
PIF_VALUE: 32
PIF_VALUE: 26
PIF_VALUE: 24
PIF_VALUE: 29
PIF_VALUE: 26
PIF_VALUE: 26
PIF_VALUE: 32
PIF_VALUE: 27
PIF_VALUE: 29
PIF_VALUE: 28
PIF_VALUE: 26
PIF_VALUE: 36
PIF_VALUE: 31
PIF_VALUE: 33
PIF_VALUE: 30
PIF_VALUE: 31
PIF_VALUE: 26
PIF_VALUE: 32
PIF_VALUE: 30
PIF_VALUE: 32
PIF_VALUE: 27
PIF_VALUE: 29
PIF_VALUE: 28
PIF_VALUE: 27
PIF_VALUE: 33
PIF_VALUE: 26
PIF_VALUE: 31
PIF_VALUE: 31
PIF_VALUE: 27
PIF_VALUE: 18
PIF_VALUE: 29
PIF_VALUE: 29
PIF_VALUE: 33
PIF_VALUE: 31
PIF_VALUE: 13
PIF_VALUE: 30
PIF_VALUE: 32
PIF_VALUE: 28
PIF_VALUE: 26
PIF_VALUE: 26
PIF_VALUE: 31
PIF_VALUE: 30
PIF_VALUE: 30
PIF_VALUE: 27
PIF_VALUE: 26
PIF_VALUE: 34
PIF_VALUE: 27
PIF_VALUE: 31
PIF_VALUE: 27
PIF_VALUE: 29
PIF_VALUE: 28
PIF_VALUE: 26
PIF_VALUE: 28
PIF_VALUE: 31
PIF_VALUE: 30
PIF_VALUE: 26
PIF_VALUE: 26
PIF_VALUE: 29
PIF_VALUE: 30
PIF_VALUE: 29
PIF_VALUE: 26
PIF_VALUE: 27
PIF_VALUE: 28
PIF_VALUE: 31
PIF_VALUE: 26
PIF_VALUE: 27
PIF_VALUE: 28
PIF_VALUE: 31
PIF_VALUE: 11
PIF_VALUE: 27
PIF_VALUE: 30

## 2020-01-01 ASSESSMENT — PAIN SCALES - GENERAL
PAINLEVEL_OUTOF10: 0
PAINLEVEL_OUTOF10: 4
PAINLEVEL_OUTOF10: 0
PAINLEVEL_OUTOF10: 3
PAINLEVEL_OUTOF10: 0
PAINLEVEL_OUTOF10: 2
PAINLEVEL_OUTOF10: 0
PAINLEVEL_OUTOF10: 3
PAINLEVEL_OUTOF10: 0
PAINLEVEL_OUTOF10: 4
PAINLEVEL_OUTOF10: 0
PAINLEVEL_OUTOF10: 1
PAINLEVEL_OUTOF10: 0
PAINLEVEL_OUTOF10: 1
PAINLEVEL_OUTOF10: 0
PAINLEVEL_OUTOF10: 5
PAINLEVEL_OUTOF10: 2
PAINLEVEL_OUTOF10: 0
PAINLEVEL_OUTOF10: 0

## 2020-02-28 PROBLEM — I27.20 PULMONARY HYPERTENSION (HCC): Status: ACTIVE | Noted: 2020-01-01

## 2020-02-28 PROBLEM — J96.02 ACUTE HYPERCAPNIC RESPIRATORY FAILURE (HCC): Status: ACTIVE | Noted: 2020-01-01

## 2020-02-28 PROBLEM — J96.90 RESPIRATORY FAILURE (HCC): Status: ACTIVE | Noted: 2020-01-01

## 2020-02-28 PROBLEM — N18.30 CKD (CHRONIC KIDNEY DISEASE) STAGE 3, GFR 30-59 ML/MIN (HCC): Status: ACTIVE | Noted: 2020-01-01

## 2020-02-28 PROBLEM — I50.30 DIASTOLIC CHF (HCC): Status: ACTIVE | Noted: 2020-01-01

## 2020-02-28 PROBLEM — I26.99 PULMONARY EMBOLI (HCC): Status: ACTIVE | Noted: 2020-01-01

## 2020-02-28 PROBLEM — I10 HYPERTENSION: Status: ACTIVE | Noted: 2020-01-01

## 2020-02-28 NOTE — H&P
Critical Care - History and Physical Examination    Patient's name: Martha Malloy Record Number: [id number]  Patient's account/billing number: [de-identified]  Patient's YOB: 1945  Age: 67 y.o. Date of Admission: 2/28/2020 12:38 PM  Reason of ICU admission:   Date of History and Physical Examination: 2/28/2020      Primary Care Physician: Dahlia Barron  Attending Physician:    Code Status: Full Code    Chief complaint: Worsening shortness of breath, flu positive. Reason for ICU admission: Unable to tolerate Bipap required Intubation. History Of Present Illness:   History was obtained from chart review. Maldonado Xavier is a 67 y.o. with PMH of COPD she uses 3L on home O2, PE on coumadin, CKD stage 4, Diastolic heart failure, hypertension presented to [facility] initially for dyspnea. She was recently discharged from the hospital on 2/25/20 for COPD exacerbation and resp failure. Patient continued to be short of breath and [facility]. Overnight patient's breathing worsened unrelieved with Bipap so was intubated and was transferred to [facility] for further care. Pertinent labs: Influenza A positive, Leukocytosis 16.9, Hb 15.2, INR 1.54, lactate 1.0, ABG on Bipap: ph 7.283,pCO2 64.9,pO2 81.5, HCO3 30. CXR No acute findings  EKG showed sinus tachycardia  Patient was given IV solumedrol 125 mcg/ 6hours, Albuterol, Duoneb and Budesonide. On my examination, patient is responsive, following commands. As per patient's son she has a pretty sedentary lifestyle, no history of clots in the leg, travel history or any malignancy in the family. Vitals /47 with a map of 61, heart rate in 90s. Vent settings PRVC FiO2 40%, PEEP of 5, . ABG pH 7.195, CO2 69.4, O2 61.6. Patient is positive for influenza A.  UA negative  Follow-up with blood culture, urine culture, RSV panel and sputum culture.   We will start the patient on Tamiflu and IV Solu-Medrol 40 mg every 8 hours,. Patient has a history of PE and is on Coumadin with subtherapeutic INR, will start the patient on heparin drip. Past Medical History:        Diagnosis Date    COPD (chronic obstructive pulmonary disease) (HCC)     [facility]    Hx of blood clots     Hypertension        Past Surgical History:        Procedure Laterality Date    CHOLECYSTECTOMY      ENDOSCOPY, COLON, DIAGNOSTIC      FRACTURE SURGERY         Allergies: Allergies not on file      Home Medications:   Prior to Admission medications    Not on File       Social History:   TOBACCO: Chronic smoker  ETOH:   reports no history of alcohol use. DRUGS:  reports no history of drug use. Family History:   No family history on file. REVIEW OF SYSTEMS (ROS):  Review of Systems - Negative except mentioned in HPI   General ROS: negative  Psychological ROS: negative  Ophthalmic ROS: negative  ENT: negative  Hematological and Lymphatic ROS: negative  Endocrine ROS: negative  Breast ROS: negative  Respiratory ROS: no cough, shortness of breath, or wheezing  Cardiovascular ROS: no chest pain or dyspnea on exertion  Gastrointestinal ROS:negative  Genito-Urinary ROS: negative  Musculoskeletal ROS: negative  Neurological ROS: negative  Dermatological ROS: negative      Physical Exam:    Vitals: Pulse 91   Temp 97.5 °F (36.4 °C) (Oral)   Resp 20   Ht 5' (1.524 m)   Wt 144 lb 13.5 oz (65.7 kg)   SpO2 100%   BMI 28.29 kg/m²     Body weight:   Wt Readings from Last 3 Encounters:   02/28/20 144 lb 13.5 oz (65.7 kg)       Body Mass Index : Body mass index is 28.29 kg/m². PHYSICAL EXAMINATION :  Constitutional: Intubated, responsive to stimuli and following commands. EENT: PERRLA, EOMI, sclera clear  Neck: symmetrical, trachea midline, no adenopathy, thyroid symmetric  Respiratory: Bilateral air entry present, mild diffuse crackles appreciated.   No wheezing  Cardiovascular: regular rate and rhythm, normal  Pulmonary hypertension (HCC)     Assesment and Plan:    -Acute hypoxic hypercapnic respiratory failure: Intubated with vent settings FiO2 40%, PEEP of 5, RR 19, . ABG showed pH 7.195, CO2 69.4, O2 61.6. Follow-up with repeat ABG. On propofol drip. If the patient continues to be hypotensive on propofol will switch to Versed and if the patient continues to air trap, the patient might need Nimbex. -Viral pneumonia secondary to influenza A: Started the patient on Tamiflu 75 mg BID for a total of 5 days. Follow-up RSV panel, sputum culture, blood culture. Droplet precautions. -?COPD exacerbation: Continue DuoNeb, Proventil and IV solumedrol 40mg q8 hours    -History of PE: Patient takes Coumadin at home, INR 1.5. We will start the patient on heparin drip for now. -Chronic diastolic heart failure: We will repeat echo. -CKD stage 3: Creatinine 1.03, unsure of the baseline creatinine.    -Essential hypertension: Patient takes Toprol, Spironolactone at home. Will have to check with pharmacy. Monitor vitals, and resume home meds as required. -GERD: Patient takes omeprazole at home. -DVT prophylaxis: patient is on heparin drip. Sugar Das MD  PGY1 Internal Medicine Resident  [address] [place] [address]  2/28/2020 4:52 PM    Attending Physician Statement  I have discussed the care of Murtaza Sheldon, including pertinent history and exam findings with the resident. I have reviewed the key elements of all parts of the encounter with the resident. I have seen and examined the patient with the resident. I agree with the assessment and plan and status of the problem list as documented. I seen the patient in the ICU, I have reviewed the labs available from [facility] and also seen the x-ray report on the imaging not available from there.   She has history of COPD on home oxygen, chronic hypoxemic hypercapnic respiratory failure, history of diastolic dysfunction

## 2020-02-29 NOTE — CARE COORDINATION
Case Management Initial Discharge Plan  Roxie Garvin,             Met with:called son on phone to discuss discharge plans. Information verified: address, contacts, phone number, , insurance Yes  PCP: Van Cramer  Date of last visit: 20    Insurance Provider: [facility]    Discharge Planning    Living Arrangements:  Children   Support Systems:  Children, Family Members    Home has 2 stories  3 stairs to climb to get into front door, 1 flight stairs to climb to reach second floor  Location of bedroom/bathroom in home main    Patient able to perform ADL's:Assisted    Current Services (outpatient & in home) dme  DME equipment: walker, cane, oxygen  DME provider: Janeth      Potential Assistance Needed:  Home Care    Patient agreeable to home care: Yes  Freedom of choice provided:  yes    Prior SNF/Rehab Placement and Facility: none  Agreeable to SNF/Rehab: No  [place] of choice provided: n/a   Evaluation: n/a    Expected Discharge date:     Patient expects to be discharged to: Follow Up Appointment: Best Day/ Time:      Transportation provider: son or family  Transportation arrangements needed for discharge: No    Readmission Risk              Risk of Unplanned Readmission:        17             Does patient have a readmission risk score greater than 14?: Yes  If yes, follow-up appointment must be made within 7 days of discharge. Goals of Care: improve breathing      Discharge Plan: home with son, open to home care needs [facility]. list and pcp.            Electronically signed by Jyl Lanes, RN on 20 at 10:41 AM

## 2020-02-29 NOTE — PLAN OF CARE
Problem: Fluid Volume - Imbalance:  Goal: Absence of imbalanced fluid volume signs and symptoms  Description  Absence of imbalanced fluid volume signs and symptoms  2/29/2020 1200 by Lexi Miller RN  Outcome: Ongoing  2/29/2020 0546 by Blu Kramer RN  Outcome: Ongoing     Problem: Skin Integrity/Risk  Goal: No skin breakdown during hospitalization  2/29/2020 1200 by Lexi Miller RN  Outcome: Ongoing  2/29/2020 0546 by Blu Kramer RN  Outcome: Ongoing  Goal: Wound healing  2/29/2020 1200 by Lexi Miller RN  Outcome: Ongoing  2/29/2020 0546 by Blu Kramer RN  Outcome: Ongoing     Problem: OXYGENATION/RESPIRATORY FUNCTION  Goal: Patient will maintain patent airway  Outcome: Ongoing  Goal: Patient will achieve/maintain normal respiratory rate/effort  Description  Respiratory rate and effort will be within normal limits for the patient  Outcome: Ongoing     Problem: MECHANICAL VENTILATION  Goal: Patient will maintain patent airway  Outcome: Ongoing  Goal: Oral health is maintained or improved  Outcome: Ongoing  Goal: ET tube will be managed safely  Outcome: Ongoing  Goal: Ability to express needs and understand communication  Outcome: Ongoing  Goal: Mobility/activity is maintained at optimum level for patient  Outcome: Ongoing     Problem: SKIN INTEGRITY  Goal: Skin integrity is maintained or improved  2/29/2020 1200 by Lexi Miller RN  Outcome: Ongoing  2/29/2020 0546 by Blu Kramer RN  Outcome: Ongoing     Problem: Falls - Risk of:  Goal: Will remain free from falls  Description  Will remain free from falls  Outcome: Ongoing  Goal: Absence of physical injury  Description  Absence of physical injury  Outcome: Ongoing     Problem: Risk for Impaired Skin Integrity  Goal: Tissue integrity - skin and mucous membranes  Description  Structural intactness and normal physiological function of skin and  mucous membranes.   Outcome: Ongoing

## 2020-02-29 NOTE — PROGRESS NOTES
Critical gas report:   PH: 7.16  CO2: 76.1  PO2: 129.7  HCO3: 27.2  BE: -3.1    Dr. Ulysses Meyer notified

## 2020-02-29 NOTE — PROCEDURES
Central Line Placement Procedure Note    Performed by: Krissy Givens MD    Indication: vascular access and poor peripheral access    Consent: Unable to be obtained due to the emergent nature of this procedure. Time out performed: Immediately prior to the procedure a \"time out\" was called to verify the correct patient, the correct procedure, equipment, support staff and site/side marked as required. All elements of maximal sterile barrier techniques were followed. Procedure: The patient was positioned appropriately and the skin over the right femoral vein was prepped with betadine and draped in a sterile fashion. Local anesthesia was obtained by infiltration using 1% Lidocaine without epinephrine. A large bore needle was used to identify the vein. A guide wire was then inserted into the vein through the needle. A triple lumen catheter was then inserted into the vessel over the guide wire using the Seldinger technique. All ports showed good, free flowing blood return and were flushed with saline solution. The catheter was then securely fastened to the skin with sutures and covered with a sterile dressing. A post procedure X-ray was ordered and is still pending at this time. The patient tolerated the procedure well.     Complications: None    Krissy Givens MD  PGY1 Internal Medicine Resident  [facility]  2/29/2020 4:16 PM

## 2020-02-29 NOTE — PROGRESS NOTES
Nutrition Assessment    Type and Reason for Visit: Initial, Consult(TF ordering/management)    Nutrition Recommendations: Suggest semi-elemental formula at 35 mL/hr to provide 1008 kcals, 63 gm protein. TF formula is low in carbohydrates. Nutrition Assessment: Pt intubated. On insulin drip. Discussed with RN. Nutrition Risk Level: High    Nutrient Needs:  · Estimated Daily Total Kcal: 2923-6142 kcals/day  · Estimated Daily Protein (g): 65-90 gm/day    Nutrition Diagnosis:   · Problem: Inadequate oral intake  · Etiology: related to Impaired respiratory function-inability to consume food     Signs and symptoms:  as evidenced by NPO status due to medical condition    Objective Information:  · Wound Type: None  · Current Nutrition Therapies:  · Oral Diet Orders: NPO   · Anthropometric Measures:  · Ht: 5' (152.4 cm)   · Current Body Wt: 144 lb 13.5 oz (65.7 kg)  · Ideal Body Wt: 100 lb (45.4 kg), % Ideal Body 146%  · BMI Classification: BMI 30.0 - 34.9 Obese Class I    Nutrition Interventions:   Start Tube Feeding  Continued Inpatient Monitoring, Education Not Indicated    Nutrition Evaluation:   · Evaluation: Goals set   · Goals: Meet % of estimated nutrient needs.     · Monitoring: TF Intake, TF Tolerance, Pertinent Labs, Weight      Electronically signed by Janeth Duverney, MS, RD, LD on 2/29/20 at 1:30 PM    Contact Number: [phone]

## 2020-02-29 NOTE — PROGRESS NOTES
Duoneb and Budesonide.     On my examination, patient is responsive, following commands. As per patient's son she has a pretty sedentary lifestyle, no history of clots in the leg, travel history or any malignancy in the family. Vitals /47 with a map of 61, heart rate in 90s. Vent settings PRVC FiO2 40%, PEEP of 5, . ABG pH 7.195, CO2 69.4, O2 61.6. Patient is positive for influenza A.  UA negative  Follow-up with blood culture, urine culture, RSV panel and sputum culture. We will start the patient on Tamiflu and IV Solu-Medrol 40 mg every 8 hours,.   Patient has a history of PE and is on Coumadin with subtherapeutic INR, will start the patient on heparin drip    TODAY:      AWAKE & FOLLOWING COMMANDS:  [x] No   [] Yes    SECRETIONS Amount:  [] Small [x] Moderate  [] Large  [] None  Color:     [] White [] Colored  [] Bloody    SEDATION:  RAAS Score:  [] Propofol gtt  [x] Versed gtt  [] Ativan gtt   [] No Sedation    PARALYZED:  [] No    [x] Yes    VASOPRESSORS:  [] No    [] Yes  [x] Levophed [] Dopamine [] Vasopressin  [] Dobutamine [] Phenylephrine [] Epinephrine      OBJECTIVE:     VITAL SIGNS:  BP (!) 78/43   Pulse 117   Temp 98.4 °F (36.9 °C) (Axillary)   Resp 16   Ht 5' (1.524 m)   Wt 144 lb 13.5 oz (65.7 kg)   SpO2 96%   BMI 28.29 kg/m²   Tmax over 24 hours:  Temp (24hrs), Av.6 °F (36.4 °C), Min:97.2 °F (36.2 °C), Max:98.4 °F (36.9 °C)      Patient Vitals for the past 8 hrs:   BP Temp Temp src Pulse Resp SpO2   20 0815 (!) 78/43 -- -- 117 16 96 %   20 0804 -- -- -- 116 18 95 %   20 0800 (!) 91/43 98.4 °F (36.9 °C) Axillary 116 18 95 %   20 0745 (!) 87/44 -- -- 117 18 95 %   20 0730 (!) 82/45 -- -- 118 18 94 %   20 0715 -- -- -- 122 18 --   20 0700 (!) 120/52 -- -- 121 (!) 5 94 %   20 0630 111/60 -- -- 128 28 96 %   20 0600 (!) 102/56 -- -- 131 28 97 %   20 0530 (!) 93/57 -- -- 126 30 97 %   20 0506 -- -- -- 132 27 99 % 02/29/20 0500 (!) 103/49 97.2 °F (36.2 °C) Axillary 134 28 99 %   02/29/20 0430 (!) 97/55 -- -- 132 (!) 34 99 %   02/29/20 0400 (!) 90/52 -- -- 131 26 99 %   02/29/20 0352 -- -- -- 132 26 100 %   02/29/20 0330 (!) 114/46 -- -- 130 20 98 %   02/29/20 0300 (!) 109/47 -- -- 129 20 98 %   02/29/20 0230 (!) 118/46 -- -- 128 20 98 %   02/29/20 0215 (!) 130/48 -- -- 128 20 98 %   02/29/20 0200 (!) 127/46 -- -- 127 20 98 %   02/29/20 0145 (!) 135/52 -- -- 126 20 98 %   02/29/20 0130 (!) 132/53 -- -- 126 20 98 %   02/29/20 0115 (!) 129/52 -- -- 126 20 97 %   02/29/20 0100 (!) 116/49 97.5 °F (36.4 °C) Axillary 126 20 97 %   02/29/20 0045 (!) 91/42 -- -- 128 23 97 %         Intake/Output Summary (Last 24 hours) at 2/29/2020 0838  Last data filed at 2/29/2020 0800  Gross per 24 hour   Intake 2893.54 ml   Output 770 ml   Net 2123.54 ml     Date 02/29/20 0000 - 02/29/20 2359   Shift 4990-5415 1151-8367 9600-0104 24 Hour Total   INTAKE   I.V.(mL/kg) 1006(15.3)   1006(15.3)   Shift Total(mL/kg) 6923(32.6)   4011(70.3)   OUTPUT   Urine(mL/kg/hr) 222(0.4) 15  237   Emesis/NG output(mL/kg) 50(0.8)   50(0.8)   Shift Total(mL/kg) 272(4.1) 15(0.2)  287(4.4)   Weight (kg) 65.7 65.7 65.7 65.7     Wt Readings from Last 3 Encounters:   02/28/20 144 lb 13.5 oz (65.7 kg)     Body mass index is 28.29 kg/m².         PHYSICAL EXAM:  GEN:  Intubated, sedated and paralyzed  EYES:  lids and lashes normal, pupils equal, round, and reactive to light  LUNGS:  Diminished bilateral air entry  CV:    regular rate and rhythm and normal S1 and S2  ABDOMEN:   normal bowel sounds, soft and non-distended  MSK:    there is no redness, warmth, or swelling of the joints  NEURO[de-identified]   Mental Status Exam:  Level of Alertness:   somnolent  SKIN:   Bruising on B/L upper arm  EXTREMITIES:  No pedal or leg edema, no calf tenderness/swelling, no erythema, distal pulses intact       MEDICATIONS:  Scheduled Meds:   ipratropium-albuterol  1 ampule Inhalation 4x daily    albuterol  2.5 mg Nebulization BID    oseltamivir 6mg/ml  75 mg Oral BID    sodium chloride flush  10 mL Intravenous 2 times per day    pantoprazole  40 mg Intravenous Daily    And    sodium chloride (PF)  10 mL Intravenous Daily    methylPREDNISolone  40 mg Intravenous Q8H     Continuous Infusions:   insulin      dextrose      norepinephrine 6 mcg/min (02/29/20 0823)    heparin (porcine) 8 Units/kg/hr (02/29/20 0415)    sodium chloride 75 mL/hr at 02/29/20 0531    midazolam 5 mg/hr (02/29/20 0643)    cisatracurium (NIMBEX) infusion 3 mcg/kg/min (02/29/20 0642)     PRN Meds:   glucose, 15 g, PRN  dextrose, 12.5 g, PRN  glucagon (rDNA), 1 mg, PRN  dextrose, 100 mL/hr, PRN  sodium chloride flush, 10 mL, PRN  acetaminophen, 650 mg, Q6H PRN    Or  acetaminophen, 650 mg, Q6H PRN  polyethylene glycol, 17 g, Daily PRN  promethazine, 12.5 mg, Q6H PRN    Or  ondansetron, 4 mg, Q6H PRN  potassium chloride, 10 mEq, PRN  heparin (porcine), 60 Units/kg, PRN  heparin (porcine), 30 Units/kg, PRN  fentanNYL, 25 mcg, Q1H PRN        DATA:  Complete Blood Count:   Recent Labs     02/28/20 1751 02/29/20 0224   WBC 14.0* 16.5*   RBC 4.02 4.17   HGB 12.4 12.5   HCT 40.6 43.2   .0 103.6*   MCH 30.8 30.0   MCHC 30.5 28.9   RDW 14.3 14.3    147   MPV 10.0 10.4        Last 3 Blood Glucose:   Recent Labs     02/28/20 1751 02/29/20 0224   GLUCOSE 156* 286*        PT/INR:  No results found for: PROTIME, INR  PTT:    Lab Results   Component Value Date    APTT >120.0 02/29/2020       Comprehensive Metabolic Profile:   Recent Labs     02/28/20 1751 02/29/20 0224    134*   K 4.3 4.1    99   CO2 22 20   BUN 27* 33*   CREATININE 0.88 1.26*   GLUCOSE 156* 286*   CALCIUM 7.6* 7.6*   PROT 5.0*  --    LABALBU 2.8*  --    BILITOT 0.21*  --    ALKPHOS 54  --    AST 27  --    ALT 34*  --       Magnesium: No results found for: MG  Phosphorus: No results found for: PHOS  Ionized Calcium: No results found for: CAION last night this morning she was on respiratory rate of 18 tidal volume of 290 PEEP of 8 and FiO2 40% saturating well but continued to have high PCO2, initially ventilator setting was increased to 20 and tidal volume increased from 2  repeat blood gas showed worsening of PH and PCO2 and then respiratory rate was decreased to improve I to E ratio but repeat pH was actually worsened with 7.04 and a PCO2 of 96 so respiratory rate was increased again to 20 and will repeat blood gas in 4 hours and change I time to 0.65 I to E ratio is 1:3.  Chest x-ray this morning did not show consolidation infiltrate and no pneumothorax  He also has low urine output and creatinine is increasing to 1.65, blood sugar is high and will start on insulin drip, she is hypotensive still and will place central line will give fluid bolus and continue with IV fluid. IV fluid bolus. Start tube feeding. Continue with IV fluid. Central venous line placement. Continue with insulin drip. Continue with steroids. Aggressive bronchodilator therapy. Follow-up ABG. Echocardiogram.  Continue with heparin drip. Continue with Nimbex drip. Discussed with respiratory therapist and plan discussed. Discussed with nursing staff. Total critical care time caring for this patient with life threatening, unstable organ failure, including direct patient contact, management of life support systems, review of data including imaging and labs, discussions with other team members and physicians at least 27  Min so far today, excluding procedures. Please note that this chart was generated using voice recognition Dragon dictation software. Although every effort was made to ensure the accuracy of this automated transcription, some errors in transcription may have occurred.       Janeen Miguel MD  2/29/2020 9:36 AM

## 2020-03-01 NOTE — PLAN OF CARE
Problem: Fluid Volume - Imbalance:  Goal: Absence of imbalanced fluid volume signs and symptoms  Description  Absence of imbalanced fluid volume signs and symptoms  Outcome: Ongoing     Problem: Skin Integrity/Risk  Goal: No skin breakdown during hospitalization  Outcome: Ongoing  Goal: Wound healing  Outcome: Ongoing     Problem: Nutrition  Goal: Optimal nutrition therapy  Outcome: Ongoing     Problem: SKIN INTEGRITY  Goal: Skin integrity is maintained or improved  Outcome: Ongoing     Problem: Falls - Risk of:  Goal: Will remain free from falls  Description  Will remain free from falls  Outcome: Ongoing  Goal: Absence of physical injury  Description  Absence of physical injury  Outcome: Ongoing       Problem: Risk for Impaired Skin Integrity  Goal: Tissue integrity - skin and mucous membranes  Description  Structural intactness and normal physiological function of skin and  mucous membranes.   Outcome: Ongoing

## 2020-03-01 NOTE — PLAN OF CARE
Problem: OXYGENATION/RESPIRATORY FUNCTION  Goal: Patient will maintain patent airway  3/1/2020 0844 by Kevin Baker RCP  Outcome: Ongoing  2/29/2020 2258 by Theresa Faulkner RCP  Outcome: Ongoing  Goal: Patient will achieve/maintain normal respiratory rate/effort  Description  Respiratory rate and effort will be within normal limits for the patient  3/1/2020 0844 by Kevin Baker RCP  Outcome: Ongoing  2/29/2020 2258 by Theresa Faulkner RCP  Outcome: Ongoing     Problem: MECHANICAL VENTILATION  Goal: Patient will maintain patent airway  3/1/2020 0844 by Kevin Baker RCP  Outcome: Ongoing  2/29/2020 2258 by Theresa Faulkner RCP  Outcome: Ongoing  Goal: Oral health is maintained or improved  3/1/2020 0844 by Kevin Baker RCP  Outcome: Ongoing  2/29/2020 2258 by Theresa Faulkner RCP  Outcome: Ongoing  Goal: ET tube will be managed safely  3/1/2020 0844 by Kevin Baker RCP  Outcome: Ongoing  2/29/2020 2258 by Theresa Faulkner RCP  Outcome: Ongoing  Goal: Ability to express needs and understand communication  Outcome: Ongoing  Goal: Mobility/activity is maintained at optimum level for patient  Outcome: Ongoing     Problem: SKIN INTEGRITY  Goal: Skin integrity is maintained or improved  3/1/2020 0534 by Sahil Archuleta RN  Outcome: Ongoing

## 2020-03-01 NOTE — PROGRESS NOTES
Critical Care Team - Daily Progress Note      Date and time: 3/1/2020 8:19 AM  Patient's name: Martha Malloy Record Number: [id number]  Patient's account/billing number: [de-identified]  Patient's YOB: 1945  Age: 67 y.o. Date of Admission: 2/28/2020 12:38 PM  Length of stay during current admission: 2      Primary Care Physician: Itz Matos  ICU Attending Physician: Dr. Duarte Rank Status: Full Code    Reason for ICU admission: No chief complaint on file. Subjective:     OVERNIGHT EVENTS:   Patient seen and examined bedside. Blood pressure 135/47. Off Levophed since 2 AM.  Continued on Nimbex drip and midazolam.  Continued on heparin drip for PE. Vent settings /PEEP 8/FiO2 40%   Creatinine got worse to 2.02. Urine output 500 mL overnight. IV NS infusion running at 75 mL/h   No other acute issues overnight.     AWAKE & FOLLOWING COMMANDS:  [x] No   [] Yes    CURRENT VENTILATION STATUS:     [x] Ventilator  [] BIPAP  [] Nasal Cannula [] Room Air      IF INTUBATED, ET TUBE MARKING AT LOWER LIP:       cms    SECRETIONS Amount:  [] Small [x] Moderate  [] Large  [] None  Color:     [x] White [] Colored  [] Bloody      PARALYZED:  [] No    [x] Yes    DIARRHEA:                [x] No                [] Yes  (C. Difficile status: [] positive                                                                                                                       [] negative                                                                                                                     [] pending)    VASOPRESSORS:  [x] No    [] Yes    If yes -   [] Levophed       [] Dopamine     [] Vasopressin       [] Dobutamine  [] Phenylephrine         [] Epinephrine    CENTRAL LINES:     [] No   [x] Yes   (Date of Insertion:   )           If yes -     [] Right IJ     [] Left IJ [] Right Femoral [] Left Femoral                   [] Right Subclavian [] Left Subclavian       GRIER'S CATHETER:   [] No   [x] Yes ENZYMES: No results for input(s): CKMB, CKMBINDEX, TROPONINI in the last 72 hours. Invalid input(s): CKTOTAL;3  BNP: No results for input(s): BNP in the last 72 hours. LFTS  Recent Labs     02/28/20  1751   ALKPHOS 54   ALT 34*   AST 27   BILITOT 0.21*   LABALBU 2.8*       AMYLASE/LIPASE/AMMONIA  No results for input(s): AMYLASE, LIPASE, AMMONIA in the last 72 hours. Last 3 Blood Glucose:   Recent Labs     02/28/20  1751 02/29/20  0224 02/29/20  1307 03/01/20  0350   GLUCOSE 156* 286* 122* 181*      HgBA1c:    Lab Results   Component Value Date    LABA1C 7.1 02/29/2020         TSH:  No results found for: TSH  ANEMIA STUDIES  No results for input(s): LABIRON, TIBC, FERRITIN, AXIYYSDL57, FOLATE, OCCULTBLD in the last 72 hours. Cultures during this admission:     Blood cultures:                 [] None drawn      [x] Negative             []  Positive (Details:  )  Urine Culture:                   [] None drawn      [x] Negative             []  Positive (Details:  )  Sputum Culture:               [] None drawn       [x] Negative             []  Positive (Details:  )   Endotracheal aspirate:     [] None drawn       [] Negative             []  Positive (Details:  )        ASSESSMENT:     Principal Problem:    Acute hypercapnic respiratory failure (HCC)  Active Problems:    Respiratory failure (HCC)    CKD (chronic kidney disease) stage 3, GFR 30-59 ml/min (HCC)    Pulmonary emboli (HCC)    Diastolic CHF ([address]    Hypertension    Pulmonary hypertension ([address]  Resolved Problems:    * No resolved hospital problems. *        PLAN:     Acute hypoxic hypercapnic respiratory failure:   Blood pressure 135/47.   Off Levophed since 2 AM.  Continued on Nimbex drip and midazolam. Vent settings /PEEP 8/FiO2 40%      -Hypotension:  Off Levophed since 2 AM. Continue IV NS running at 75 ml/h.       -Viral pneumonia secondary to influenza A: Started the patient on Tamiflu 30 mg BID for a total of 5 days.  Follow-up RSV percent  Decrease FiO2 to 30% as PO2 is 145 ABG 7.1 7/66/145/24 although serum bicarb is 19. Chest x-ray today did not show any acute infiltrate  We will get nephrology evaluation and she will need to start diuretics today and will check renal function electrolytes today. We will decrease the dose of steroid. Continue with aggressive bronchodilator therapy she remains very poor air movement bilaterally. Continue with heparin drip  Will get renal ultrasound      Total critical care time caring for this patient with life threatening, unstable organ failure, including direct patient contact, management of life support systems, review of data including imaging and labs, discussions with other team members and physicians at least 27  Min so far today, excluding procedures. Please note that this chart was generated using voice recognition Dragon dictation software. Although every effort was made to ensure the accuracy of this automated transcription, some errors in transcription may have occurred.       Kaitlin Crespo MD  3/1/2020 10:47 AM

## 2020-03-01 NOTE — PLAN OF CARE
Problem: Fluid Volume - Imbalance:  Goal: Absence of imbalanced fluid volume signs and symptoms  Description  Absence of imbalanced fluid volume signs and symptoms  3/1/2020 1113 by Dylan Hobson RN  Outcome: Ongoing  3/1/2020 0534 by Chad Dumont RN  Outcome: Ongoing     Problem: Skin Integrity/Risk  Goal: No skin breakdown during hospitalization  3/1/2020 1113 by Dylan Hobson RN  Outcome: Ongoing  3/1/2020 0534 by Chad Dumont RN  Outcome: Ongoing  Goal: Wound healing  3/1/2020 1113 by Dylan Hobson RN  Outcome: Ongoing  3/1/2020 0534 by Chad Dumont RN  Outcome: Ongoing     Problem: OXYGENATION/RESPIRATORY FUNCTION  Goal: Patient will maintain patent airway  3/1/2020 1113 by Dylan Hobson RN  Outcome: Ongoing  3/1/2020 0844 by Carlton Lerner RCP  Outcome: Ongoing  2/29/2020 2258 by Angeles Aldana RCP  Outcome: Ongoing  Goal: Patient will achieve/maintain normal respiratory rate/effort  Description  Respiratory rate and effort will be within normal limits for the patient  3/1/2020 1113 by Dylan Hobson RN  Outcome: Ongoing  3/1/2020 0844 by Carlton Lerner RCP  Outcome: Ongoing  2/29/2020 2258 by Angeles Aldana RCP  Outcome: Ongoing     Problem: MECHANICAL VENTILATION  Goal: Patient will maintain patent airway  3/1/2020 1113 by Dylan Hobson RN  Outcome: Ongoing  3/1/2020 0844 by Carlton Lerner RCP  Outcome: Ongoing  2/29/2020 2258 by Angeles Aldana RCP  Outcome: Ongoing  Goal: Oral health is maintained or improved  3/1/2020 1113 by Dylan Hobson RN  Outcome: Ongoing  3/1/2020 0844 by Carlton Lerner RCP  Outcome: Ongoing  2/29/2020 2258 by Angeles Aldana RCP  Outcome: Ongoing  Goal: ET tube will be managed safely  3/1/2020 1113 by Dylan Hobson RN  Outcome: Ongoing  3/1/2020 0844 by Carlton Lerner RCP  Outcome: Ongoing  2/29/2020 2258 by Angeles Aldana RCP  Outcome: Ongoing  Goal: Ability to express needs and understand communication  3/1/2020 1113 by [facility]

## 2020-03-01 NOTE — CONSULTS
[facility]  In Patient Cardiology Consult             Date:   3/1/2020  Patient name: Brandon Moraes  Date of admission:  2/28/2020 12:38 PM  MRN:   [id number]  YOB: 1945    Reason for Admission: Shortness of breath. CHIEF COMPLAINT: Shortness of breath. History Obtained From: Chart as the patient is currently intubated    HISTORY OF PRESENT ILLNESS:    This is a 67-year-old female who initially presented on 2/28/2020 with complaints of worsening shortness of breath. Had a recent hospital admission on 2/25/2020 with COPD and respiratory failure at [facility].  She is currently intubated and sedated. Also paralyzed. She has influenza A. We are consulted due to rising troponin. Her troponin peaked at 139. Now back down to 120. Past Medical History:   has a past medical history of COPD (chronic obstructive pulmonary disease) ([facility] Diastolic CHF ([facility] Hx of blood clots, and Hypertension. Past Surgical History:   has a past surgical history that includes Cholecystectomy; Endoscopy, colon, diagnostic; and fracture surgery. Home Medications:    Prior to Admission medications    Not on File       Allergies:  Latex; Amoxicillin; Augmentin [amoxicillin-pot clavulanate]; Biaxin [clarithromycin]; Doxycycline; Levaquin [levofloxacin]; Losartan; Macrodantin [nitrofurantoin]; and Theophyllines    Social History:   reports that she quit smoking 7 days ago. Her smoking use included cigarettes. She has a 90.00 pack-year smoking history. She does not have any smokeless tobacco history on file. She reports that she does not drink alcohol or use drugs.      Family History:   noncontributory    REVIEW OF SYSTEMS:    Unable to obtain     PHYSICAL EXAM:    Physical Examination:    BP (!) 121/46   Pulse 118   Temp 97.5 °F (36.4 °C) (Oral)   Resp 20   Ht 5' (1.524 m)   Wt 144 lb 13.5 oz (65.7 kg)   SpO2 100%   BMI 28.29 kg/m²    Constitutional and General Appearance: intubated, sedated, paralyzed  HEENT: ET tube in palce  Respiratory:  · Normal excursion and expansion without use of accessory muscles  · Resp Auscultation: Good respiratory effort. No for increased work of breathing. On auscultation: mechanical BS  Cardiovascular:  · The apical impulse is not displaced  · Heart tones are crisp and normal. regular S1 and S2. Murmurs: none  · Jugular venous pulsation Normal  · The carotid upstroke is normal in amplitude and contour without delay or bruit  · Peripheral pulses are symmetrical and full   Abdomen:  · No masses or tenderness  · Bowel sounds present  Extremities:  ·  No Cyanosis or Clubbing  ·  Lower extremity edema: nnone  ·  Skin: Warm and dry  Neurological:  intubated, sedated, paralyzed    DATA:    Diagnostics:      EKG:   Results for orders placed or performed during the hospital encounter of 02/28/20   EKG 12 Lead   Result Value Ref Range    Ventricular Rate 121 BPM    Atrial Rate 121 BPM    P-R Interval 138 ms    QRS Duration 74 ms    Q-T Interval 314 ms    QTc Calculation (Bazett) 445 ms    P Axis 84 degrees    R Axis 82 degrees    T Axis 90 degrees    Narrative    Sinus tachycardia  Septal infarct (cited on or before 28-FEB-2020)  Abnormal ECG  When compared with ECG of 28-FEB-2020 23:20,  No significant change was found       Labs:     CBC:   Recent Labs     02/29/20  0224 03/01/20  0350   WBC 16.5* 19.8*   HGB 12.5 10.2*   HCT 43.2 33.7*    119*     BMP:   Recent Labs     02/29/20  1307 03/01/20  0350    137   K 4.0 4.2   CO2 22 19*   BUN 41* 54*   CREATININE 1.65* 2.22*   LABGLOM 30* 21*   GLUCOSE 122* 181*     BNP: No results for input(s): BNP in the last 72 hours. PT/INR: No results for input(s): PROTIME, INR in the last 72 hours.   APTT:  Recent Labs     02/29/20  2308 03/01/20  0350   APTT 51.0* 53.0*     CARDIAC ENZYMES:  Recent Labs     02/29/20  1450 02/29/20  1906 03/01/20  0350   TROPHS 131* 139* 120*     FASTING LIPID PANEL:No results found for: HDL, LDLDIRECT, LDLCALC, TRIG  LIVER PROFILE:  Recent Labs     02/28/20  1751   AST 27   ALT 34*   LABALBU 2.8*         IMPRESSION:    Patient Active Problem List   Diagnosis    Respiratory failure (HCC)    Acute hypercapnic respiratory failure (HCC)    CKD (chronic kidney disease) stage 3, GFR 30-59 ml/min (HCC)    Pulmonary emboli (HCC)    Diastolic CHF ([address]    Hypertension    Pulmonary hypertension (HCC)     -NSTEMI- most likely type 2- due to Resp failure  -Flu  -Resp failure  -CKD  -h/o PE  -h/o HTN  -h/o PHTN    RECOMMENDATIONS:  -agree with heparin drip for now  -await 2d echo  -add ASA, statin  -remainder of care per CC team  -consideration for further workup once imprved    Discussed with patient and nursing. Thank you for allowing me to participate in the care of this patient, please do not hesitate to call if you have any questions. Nikhil Olsen DO, [facility], [facility]  [email or web address]  [phone]

## 2020-03-01 NOTE — CONSULTS
Nephrology Consult Note    Reason for Consult:  Acute kidney injury, oliguria, metabolic acidosis  Requesting Physician:  Prieto Camarena    Chief Complaint:  Unable to voice chief complaint as she is intubated on the ventilator  History Obtained From:  patient, electronic medical record    History of Present Illness: This is a 67 y.o. female who presents with shortness of breath from an outside hospital.  At that outside hospital, she was intubated and transferred to [facility] after intubation. She previously had a COPD exacerbation and respiratory failure and was discharged from [facility] on 2/25/20/20. Her labs at admission showed a normal serum creatinine from 2/28/2028 0.88. The creatinine is quickly risen up to 2.22 this morning. Sodium is 137 with potassium 4.2 and chloride 106 and CO2 19 with a BUN of 54. Of note, urine output has fallen dramatically. She is making about 10-15 mL an hour of urine. Chest x-ray today on the ventilator shows hyperinflated lung fields with no consolidation or pulmonary vascular congestion. She tested positive for influenza and was started on Tamiflu and IV Solu-Medrol. Other past medical history includes diastolic CHF, pulmonary embolism on warfarin, and hypertension. Past surgical history includes a cholecystectomy and colonoscopy along with fracture surgery in the past.     Her current medications are reviewed. She has multiple allergies, including a latex allergy. Those allergies are reviewed. She is a former smoker. /14,  bpm in sinus tachycardia, Parlayzed and sedated on vent with FIO2 of 40%. Urine output 10-15 ml/minute iwith total IV fluids and feedings at 120-125 ml/hr. Arterial blood gas from earlier today showed pH 7.17, PCO2 66, PO2 145 and bicarbonate of 24 on  FiO2 of 40%.     Past Medical History:        Diagnosis Date    [facility]) [facility])     Diastolic CHF ([facility] 6/26/3816    Theophyllines    Social History:   Social History     Socioeconomic History    Marital status: Not on file     Spouse name: Not on file    Number of children: 3    Years of education: Not on file    Highest education level: Not on file   Occupational History    Not on file   Social Needs    Financial resource strain: Not on file    Food insecurity:     Worry: Not on file     Inability: Not on file    Transportation needs:     Medical: Not on file     Non-medical: Not on file   Tobacco Use    Smoking status: Former Smoker     Packs/day: 1.50     Years: 60.00     Pack years: 90.00     Types: Cigarettes     Last attempt to quit: 2020     Years since quittin.0   Substance and Sexual Activity    Alcohol use: Never     Frequency: Never    Drug use: Never    Sexual activity: Not on file     Comment: N/A   Lifestyle    Physical activity:     Days per week: Not on file     Minutes per session: Not on file    Stress: Not on file   Relationships    Social connections:     Talks on phone: Not on file     Gets together: Not on file     Attends [employer] service: Not on file     Active member of club or organization: Not on file     Attends meetings of clubs or organizations: Not on file     Relationship status: Not on file    Intimate partner violence:     Fear of current or ex partner: Not on file     Emotionally abused: Not on file     Physically abused: Not on file     Forced sexual activity: Not on file   Other Topics Concern    Not on file   Social History Narrative    Not on file       Family History:   No family history on file. Review of Systems:    Pertinent positives stated above in HPI. All other systems were reviewed and were negative.     Objective:  CURRENT TEMPERATURE:  Temp: 98.4 °F (36.9 °C)  MAXIMUM TEMPERATURE OVER 24HRS:  Temp (24hrs), Av.8 °F (36.6 °C), Min:97.2 °F (36.2 °C), Max:98.4 °F (36.9 °C)    CURRENT RESPIRATORY RATE:  Resp: 20  CURRENT PULSE:  Pulse: 117  CURRENT BLOOD PRESSURE:  BP: (!) 122/44  24HR BLOOD PRESSURE RANGE:  Systolic (73QZY), OJH:450 , Min:104 , ICX:620   ; Diastolic (13BUV), DW, Min:42, Max:54    24HR INTAKE/OUTPUT:      Intake/Output Summary (Last 24 hours) at 3/1/2020 0941  Last data filed at 3/1/2020 0900  Gross per 24 hour   Intake 3764.25 ml   Output 468 ml   Net 3296.25 ml       Physical Exam:  General appearance: sedated and paralyzed on the ventilator with an index as the patient had breath stacking earlier  Skin: warm and dry, no rash or erythema  Eyes: conjunctivae normal and sclera anicteric   ENT: :no thrush, moist mucous membranes   Neck: midline trachea, no accessory muscle use   Pulmonary: Bilateral air entry on the ventilator    Cardiovascular: tachycardic rate, regular rhythm, positive S1 and S2 and no rubs appreciated with distant heart tones  Abdomen:soft and mildly distended with sluggish bowel sounds  Extremities: No particular pitting peripheral edema  Labs:   CBC:   Recent Labs     20  1751 20  0224 20  0350   WBC 14.0* 16.5* 19.8*   RBC 4.02 4.17 3.34*   HGB 12.4 12.5 10.2*   HCT 40.6 43.2 33.7*   .0 103.6* 100.9   MCH 30.8 30.0 30.5   MCHC 30.5 28.9 30.3   RDW 14.3 14.3 14.6*    147 119*   MPV 10.0 10.4 10.6      BMP:   Recent Labs     20  0224 20  1307 20  0350   * 137 137   K 4.1 4.0 4.2   CL 99 104 106   CO2 20 22 19*   BUN 33* 41* 54*   CREATININE 1.26* 1.65* 2.22*   GLUCOSE 286* 122* 181*   CALCIUM 7.6* 7.9* 7.7*        Phosphorus:  No results for input(s): PHOS in the last 72 hours. Magnesium: No results for input(s): MG in the last 72 hours. Albumin:   Recent Labs     20  1751   LABALBU 2.8*         SPEP:   Lab Results   Component Value Date    PROT 5.0 2020     UPEP: No results found for: TPU     C3: No results found for: C3  C4: No results found for: C4  MPO ANCA:  No results found for: MPO .   PR3 ANCA:  No results found for: PR3  Urine Sodium:  No results found for: LINNETTE   Urine Potassium:  No results found for: KUR  Urine Chloride:  No components found for: CLU  Urine Ph:  No components found for: PO4U  Urine Osmolarity:  No results found for: OSMOU  Urine Creatinine:  No results found for: LABCREA  Urine Eosinophils: No components found for: EOSU  Urine Protein:  No results found for: TPU  Urinalysis:  U/A:   Lab Results   Component Value Date    NITRU NEGATIVE 03/01/2020    COLORU YELLOW 03/01/2020    PHUR 5.0 03/01/2020    WBCUA 5 TO 10 03/01/2020    RBCUA TOO NUMEROUS TO COUNT 03/01/2020    MUCUS NOT REPORTED 03/01/2020    TRICHOMONAS NOT REPORTED 03/01/2020    YEAST NOT REPORTED 03/01/2020    BACTERIA NOT REPORTED 03/01/2020    SPECGRAV 1.014 03/01/2020    LEUKOCYTESUR NEGATIVE 03/01/2020    UROBILINOGEN Normal 03/01/2020    BILIRUBINUR NEGATIVE 03/01/2020    GLUCOSEU TRACE 03/01/2020    [address] NEGATIVE 03/01/2020    AMORPHOUS NOT REPORTED 03/01/2020         Radiology:  Reviewed as available. Assessment:  1. Acute kidney injury secondary to hypoxic/ischemic ATN with septic/hy[ovlemic shock initially from Influenza A with rapidly rising creatinine and low urine output with sg Grav of urine of 1.014  2. Shock, initially, on Levophed, improved with IV fjluids, now off Levophed  3. Acute respiratory failure, ventilator dependent, sedated and paralyzed on Nimbex because of breath stacking   4. Severe metabolic acidosis, pH 8.04  5. Normokalemia  6. Oliguria  7, Microscopic hematuria and pyuria, question hardy trauma vs infection vs GN vs vasculitis  8. hypoalbuminemia       Plan:  1. Diagonostics:  Urine studies, including a urine culture with eosinophils, myoglobin, sodium creatiinine and protein and renal ultrasound   2. Acute hepatitis panel  3. Serum free light chains,  4. C3, C4, FOSTER, ANCA, Anti GBM  5. Bumex 2 mg IV push x 1 and if not sufficent urine output will utilize a Bumex drip  6.  Begin bicarbonate containing IV fluids with KCL at 75 ml/hr to

## 2020-03-02 NOTE — PLAN OF CARE
Problem: OXYGENATION/RESPIRATORY FUNCTION  Goal: Patient will maintain patent airway  3/2/2020 0016 by Cathie Montez RCP  Outcome: Ongoing     Problem: OXYGENATION/RESPIRATORY FUNCTION  Goal: Patient will achieve/maintain normal respiratory rate/effort  Description  Respiratory rate and effort will be within normal limits for the patient  3/2/2020 0016 by Cathie Montez RCP  Outcome: Ongoing     Problem: MECHANICAL VENTILATION  Goal: Patient will maintain patent airway  3/2/2020 0016 by Cathie Montez RCP  Outcome: Ongoing     Problem: MECHANICAL VENTILATION  Goal: Oral health is maintained or improved  3/2/2020 0016 by Cathie Montez RCP  Outcome: Ongoing     Problem: MECHANICAL VENTILATION  Goal: ET tube will be managed safely  3/2/2020 0016 by Cathie Montez RCP  Outcome: Ongoing     Problem: MECHANICAL VENTILATION  Goal: Ability to express needs and understand communication  3/2/2020 0016 by Cathie Montez RCP  Outcome: Ongoing     Problem: MECHANICAL VENTILATION  Goal: Mobility/activity is maintained at optimum level for patient  3/2/2020 0016 by Cathie Montez RCP  Outcome: Ongoing     Problem: SKIN INTEGRITY  Goal: Skin integrity is maintained or improved  3/2/2020 0016 by Cathie Montez RCP  Outcome: Ongoing

## 2020-03-02 NOTE — PROGRESS NOTES
Smoking Cessation - topics covered   []  Health Risks  []  Benefits of Quitting   []  Smoking Cessation  []  Patient has no history of tobacco use per note in significant history. [x]  Patient is former smoker per note in significant history. Patient quit in 2020. [x]  No need for tobacco cessation education. []  Booklet given  []  Patient verbalizes understanding. []  Patient denies need for tobacco cessation education. []  Unable to meet with patient today. Will follow up as able.   Giovanni Rhodes  9:16 AM

## 2020-03-02 NOTE — PROGRESS NOTES
02/29/20  1307 03/01/20  0350 03/01/20  1709 03/02/20  0431    137 136 135   K 4.0 4.2 4.2 4.6    106 103 98   CO2 22 19* 23 22   BUN 41* 54*  --  72*   CREATININE 1.65* 2.22*  --  2.87*   GLUCOSE 122* 181*  --  190*     Hepatic:   Recent Labs     02/28/20  1751   AST 27   ALT 34*   BILITOT 0.21*   ALKPHOS 54     Troponin:  Recent Labs     02/29/20  1906 03/01/20  0350 03/01/20  0911   TROPHS 139* 120* 113*            No results for input(s): TROPONINI in the last 72 hours. Recent Labs     02/29/20  1906 03/01/20  0350 03/01/20  0911   TROPONINT NOT REPORTED NOT REPORTED NOT REPORTED     BNP:   Recent Labs     02/28/20  1751   PROBNP 4,794*               No results for input(s): BNP in the last 72 hours. Lipids: No results for input(s): CHOL, HDL in the last 72 hours. Invalid input(s): LDLCALCU  INR: No results for input(s): INR in the last 72 hours. Objective:   Vitals: BP (!) 107/44   Pulse 111   Temp 97.7 °F (36.5 °C) (Oral)   Resp 22   Ht 5' (1.524 m)   Wt 159 lb 9.8 oz (72.4 kg)   SpO2 100%   BMI 31.17 kg/m²    Constitutional: intubated, sedated, paralyzed. HEENT: PERRLA, EOMI, sclera clear, anicteric  Respiratory: clear to auscultation, no wheezes or rales and unlabored breathing. Cardiovascular: regular rate and rhythm, normal S1, S2, no murmur noted and 2+ pulses throughout  Abdomen: soft, nontender, nondistended, no masses or organomegaly  Extremities:  peripheral pulses normal, no pedal edema,. Diagnostic Studies:     EKG: Sinus tachycardia, heart rate 121    Patient's Active Problem List   Principal Problem:    Acute hypercapnic respiratory failure (HCC)  Active Problems:    Respiratory failure (HCC)    CKD (chronic kidney disease) stage 3, GFR 30-59 ml/min (HCC)    Pulmonary emboli (HCC)    Diastolic CHF ([address]    Hypertension    Pulmonary hypertension ([facility] [address]  Resolved Problems:    * No resolved hospital problems.  *        Assessment / Acute Cardiac Problems: 1. Type II MI due to COPD exacerbation  2. Influenza A  3. COPD  4. BANDAR on CKD  5. HFpEF per EMR -no echo available  6. H/o PE  7. Pulmonary hypertension  8. BANDAR on CKD stage III          Plan of Treatment:   1. Troponins peaked at 139  2. Continue aspirin and statin  3. Continue heparin until results of 2D echo are available  4. Further work-up based on echo results. Will continue to follow        Robyn Leong MD  Fellow, [address]            Please note that part of this chart were generated using voice recognition  dictation software. Although every effort was made to ensure the accuracy of this automated transcription, some errors in transcription may have occurred. I performed a history and physical examination of the patient and discussed management with the resident. I reviewed the residents note and agree with the documented findings and plan of care. Any areas of disagreement are noted on the chart. I was personally present for the key portions of any procedures. I have documented in the chart those procedures where I was not present during the key portions. I have personally evaluated this patient and have completed at least one if not all key elements of the E/M (history, physical exam, and MDM). Additional findings are as noted.     Los Muniz MD

## 2020-03-02 NOTE — PROGRESS NOTES
Critical Care Team - Daily Progress Note      Date and time: 3/2/2020 7:33 AM  Patient's name: Martha Malloy Record Number: [id number]  Patient's account/billing number: [de-identified]  Patient's YOB: 1945  Age: 67 y.o. Date of Admission: 2/28/2020 12:38 PM  Length of stay during current admission: 3      Primary Care Physician: Caryl Painter  ICU Attending Physician:  Coley     Code Status: Full Code    Reason for ICU admission: Acute respiratory failure    Subjective:     OVERNIGHT EVENTS:   Patient seen and examined bedside. Blood pressure 130/80, HR-117 this am. Continued on Nimbex drip and midazolam.  Continued on heparin drip for PE. Vent settings PRVC RR-22//PEEP 8/FiO2 40%   ABG this morning pH 7.2/PCO2 57.7/bicarb 25.7  Afebrile. WBC trended up to 23. 9.blood culture, urine culture negative so far. Creatinine getting worse 2.87 today. Urine output 1.3 L. Received 1 dose of IV 2 mg Bumex yesterday. on bumex gtt currently. No other acute issues overnight.     AWAKE & FOLLOWING COMMANDS:  [x] No   [] Yes    CURRENT VENTILATION STATUS:     [x] Ventilator  [] BIPAP  [] Nasal Cannula [] Room Air      IF INTUBATED, ET TUBE MARKING AT LOWER LIP:       cms    SECRETIONS Amount:  [] Small [x] Moderate  [] Large  [] None  Color:     [x] White [] Colored  [] Bloody      PARALYZED:  [] No    [x] Yes    DIARRHEA:                [x] No                [] Yes  (C. Difficile status: [] positive                                                                                                                       [] negative                                                                                                                     [] pending)    VASOPRESSORS:  [x] No    [] Yes    If yes -   [] Levophed       [] Dopamine     [] Vasopressin       [] Dobutamine  [] Phenylephrine         [] Epinephrine    CENTRAL LINES:     [] No   [x] Yes   (Date of Insertion:   )           If yes -     [] Right IJ     [] Left IJ [] Right Femoral [] Left Femoral                   [] Right Subclavian [] Left Subclavian       GRIER'S CATHETER:   [] No   [x] Yes  (Date of Insertion:   )     URINE OUTPUT:            [x] Good   [] Low              [] Anuric    REVIEW OF SYSTEMS:  Limited as patient is intubated and sedated.     OBJECTIVE:     VITAL SIGNS:  BP (!) 138/46   Pulse 117   Temp 97.5 °F (36.4 °C) (Oral)   Resp 22   Ht 5' (1.524 m)   Wt 159 lb 9.8 oz (72.4 kg)   SpO2 100%   BMI 31.17 kg/m²   Tmax over 24 hours:  Temp (24hrs), Av.8 °F (36.6 °C), Min:97.2 °F (36.2 °C), Max:98.4 °F (36.9 °C)      Patient Vitals for the past 8 hrs:   BP Temp Temp src Pulse Resp SpO2 Weight   20 0600 -- -- -- 117 22 100 % --   20 0500 -- -- -- 111 22 100 % --   20 0400 -- 97.5 °F (36.4 °C) Oral 113 20 100 % 159 lb 9.8 oz (72.4 kg)   20 0300 -- -- -- 110 20 100 % --   20 0200 -- -- -- 108 20 100 % --   20 0100 -- -- -- 107 20 99 % --   20 0012 -- -- -- -- 20 99 % --   20 0009 -- -- -- -- 20 99 % --   20 0008 -- -- -- -- 20 98 % --   20 0007 -- -- -- -- 20 98 % --   20 0006 -- -- -- -- 20 99 % --   20 0005 -- -- -- -- 20 99 % --   20 0004 -- -- -- -- 20 99 % --   20 -- -- -- -- 20 99 % --   20 0002 -- -- -- -- 20 99 % --   20 0001 -- -- -- -- 20 99 % --   20 0000 (!) 138/46 97.7 °F (36.5 °C) Oral 113 20 99 % --         Intake/Output Summary (Last 24 hours) at 3/2/2020 0733  Last data filed at 3/2/2020 0600  Gross per 24 hour   Intake 2500.53 ml   Output 1349 ml   Net 1151.53 ml     Date 20 0000 - 20 2359   Shift 8639-3499 2842-3333 9121-0199 24 Hour Total   INTAKE   I.V.(mL/kg) 538(7.4)   538(7.4)   NG/GT(mL/kg) 286(4)   286(4)   Shift Total(mL/kg) 550(63.5)   824(11.4)   OUTPUT   Urine(mL/kg/hr) 560   560   Shift Total(mL/kg) 560(7.7)   560(7.7)   Weight (kg) 72.4 72.4 72.4 72.4     Wt Readings from Last 3 Encounters:   03/02/20 159 lb 9.8 oz (72.4 kg)     Body mass index is 31.17 kg/m². PHYSICAL EXAM:  Constitutional: intubated, sedated, paralyzed. HEENT: PERRLA, EOMI, sclera clear, anicteric  Respiratory: clear to auscultation, no wheezes or rales and unlabored breathing. Cardiovascular: regular rate and rhythm, normal S1, S2, no murmur noted and 2+ pulses throughout  Abdomen: soft, nontender, nondistended, no masses or organomegaly  Extremities:  peripheral pulses normal, no pedal edema,.       Any additional physical findings:      MEDICATIONS:  Scheduled Meds:   aspirin  81 mg Oral Daily    atorvastatin  40 mg Oral Nightly    methylPREDNISolone  40 mg Intravenous Q12H    docusate  100 mg Per NG tube BID    ipratropium-albuterol  1 ampule Inhalation 4x daily    albuterol  2.5 mg Nebulization BID    oseltamivir 6mg/ml  30 mg Oral BID    insulin lispro  0-12 Units Subcutaneous Q4H    sodium chloride flush  10 mL Intravenous 2 times per day    pantoprazole  40 mg Intravenous Daily    And    sodium chloride (PF)  10 mL Intravenous Daily     Continuous Infusions:   sodium bicarbonate infusion 75 mL/hr at 03/01/20 1024    bumetanide 0.1 mg/mL infusion 2 mg/hr (03/02/20 0530)    albuterol 30 mg/hr (03/02/20 0010)    dextrose      norepinephrine Stopped (03/01/20 0235)    heparin (porcine) 6 Units/kg/hr (03/02/20 0551)    midazolam 5 mg/hr (03/02/20 0148)    cisatracurium (NIMBEX) infusion 3 mcg/kg/min (03/02/20 0148)     PRN Meds:   labetalol, 10 mg, Q4H PRN  glucose, 15 g, PRN  dextrose, 12.5 g, PRN  glucagon (rDNA), 1 mg, PRN  dextrose, 100 mL/hr, PRN  sodium chloride flush, 10 mL, PRN  acetaminophen, 650 mg, Q6H PRN    Or  acetaminophen, 650 mg, Q6H PRN  polyethylene glycol, 17 g, Daily PRN  promethazine, 12.5 mg, Q6H PRN    Or  ondansetron, 4 mg, Q6H PRN  potassium chloride, 10 mEq, PRN  heparin (porcine), 60 Units/kg, PRN  heparin (porcine), 30 Units/kg, PRN  fentanNYL, 25 mcg, Q1H PRN        SUPPORT DEVICES: [x] Ventilator [] BIPAP  [] Nasal Cannula [] Room Air    VENT SETTINGS (Comprehensive) (if applicable):  Vent Information  $Ventilation: $Subsequent Day  Ventilator Started: Yes  Skin Assessment: Clean, dry, & intact  Equipment Changed: HME  Vent Type: Servo i  Vent Mode: PRVC  Vt Ordered: (S) 360 mL  Rate Set: (S) 22 bmp  FiO2 : 40 %  Sensitivity: 5  PEEP/CPAP: 8  I Time/ I Time %: 0.65 s  Cuff Pressure (cm H2O): 25 cm H2O  Humidification Source: HME  Nitric Oxide/Epoprostenol In Use?: No  Additional Respiratory  Assessments  Pulse: 117  Resp: 22  SpO2: 100 %  End Tidal CO2: 39 (%)  pCO2 (TCOM, mmHg): 38 mmHg  Position: Semi-Handley's  Humidification Source: HME  Oral Care Completed?: Yes  Oral Care: Mouthwash, Mouth swabbed, Mouth suctioned  Subglottic Suction Done?: Yes  Cuff Pressure (cm H2O): 25 cm H2O  Skin barrier applied: No    ABGs:     Lab Results   Component Value Date    CTD1TEJ 28 03/02/2020    FIO2 40.0 03/02/2020     Lactic Acid:   Lab Results   Component Value Date    LACTA NOT REPORTED 02/28/2020         DATA:  Complete Blood Count:   Recent Labs     02/29/20  0224 03/01/20  0350 03/02/20  0431   WBC 16.5* 19.8* 23.9*   HGB 12.5 10.2* 11.8*   .6* 100.9 99.5    119* 125*   RBC 4.17 3.34* 3.93*   HCT 43.2 33.7* 39.1   MCH 30.0 30.5 30.0   MCHC 28.9 30.3 30.2   RDW 14.3 14.6* 14.9*   MPV 10.4 10.6 10.9        PT/INR:  No results found for: PROTIME, INR  PTT:    Lab Results   Component Value Date    APTT 42.4 03/02/2020       Basal Metabolic Profile:   Recent Labs     02/29/20  1307 03/01/20  0350 03/01/20  1709 03/02/20  0431    137 136 135   K 4.0 4.2 4.2 4.6   BUN 41* 54*  --  72*   CREATININE 1.65* 2.22*  --  2.87*    106 103 98   CO2 22 19* 23 22      Magnesium: No results found for: MG  Phosphorus: No results found for: PHOS  S. Calcium:  Recent Labs     03/02/20  0431   CALCIUM 7.7*     S.  Ionized Calcium:No results for input(s): IONCA in the last 72 hours. Urinalysis:   Lab Results   Component Value Date    NITRU NEGATIVE 03/01/2020    COLORU YELLOW 03/01/2020    PHUR 5.0 03/01/2020    WBCUA 5 TO 10 03/01/2020    RBCUA TOO NUMEROUS TO COUNT 03/01/2020    MUCUS NOT REPORTED 03/01/2020    TRICHOMONAS NOT REPORTED 03/01/2020    YEAST NOT REPORTED 03/01/2020    BACTERIA NOT REPORTED 03/01/2020    SPECGRAV 1.014 03/01/2020    LEUKOCYTESUR NEGATIVE 03/01/2020    UROBILINOGEN Normal 03/01/2020    BILIRUBINUR NEGATIVE 03/01/2020    GLUCOSEU TRACE 03/01/2020    KETUA NEGATIVE 03/01/2020    AMORPHOUS NOT REPORTED 03/01/2020       CARDIAC ENZYMES: No results for input(s): CKMB, CKMBINDEX, TROPONINI in the last 72 hours. Invalid input(s): CKTOTAL;3  BNP: No results for input(s): BNP in the last 72 hours. LFTS  Recent Labs     02/28/20  1751   ALKPHOS 54   ALT 34*   AST 27   BILITOT 0.21*   LABALBU 2.8*       AMYLASE/LIPASE/AMMONIA  No results for input(s): AMYLASE, LIPASE, AMMONIA in the last 72 hours. Last 3 Blood Glucose:   Recent Labs     02/28/20  1751 02/29/20  0224 02/29/20  1307 03/01/20  0350 03/02/20  0431   GLUCOSE 156* 286* 122* 181* 190*      HgBA1c:    Lab Results   Component Value Date    LABA1C 7.1 02/29/2020         TSH:  No results found for: TSH  ANEMIA STUDIES  No results for input(s): LABIRON, TIBC, FERRITIN, [id number], FOLATE, OCCULTBLD in the last 72 hours.       Cultures during this admission:     Blood cultures:                 [] None drawn      [x] Negative             []  Positive (Details:  )  Urine Culture:                   [] None drawn      [x] Negative             []  Positive (Details:  )        ASSESSMENT:     Principal Problem:    Acute hypercapnic respiratory failure (HCC)  Active Problems:    Respiratory failure (HCC)    CKD (chronic kidney disease) stage 3, GFR 30-59 ml/min (HCC)    Pulmonary emboli (HCC)    Diastolic CHF ([facility] [address]    Hypertension    Pulmonary hypertension ([facility] [address]  Resolved Problems:    * No resolved hospital problems. *        PLAN:     Acute hypoxic hypercapnic respiratory failure:  Vent settings RR-22,/PEEP 8/FiO2 40%. ABG this morning pH 7.2/PCO2 57.7/bicarb 25.7  On Nimbex drip and midazolam.  Continued on heparin drip for PE. Afebrile. WBC trended up to 23. 9.blood culture, urine culture negative so far. -Hypotension:  Will resume home blood pressure medications as appropriate. Influenza A: Continued on Tamiflu 30 mg twice daily for 8 doses. RSV panel, sputum culture, blood and urine culture negative so far. Culture.  Droplet precautions.     -COPD exacerbation: Continue DuoNeb, Proventil and IV solumedrol 40mg q12  hours     -Hyperglycemia secondary to steroid use. Off drip. Medium dose insulin sliding scale. Hypoglycemia protocol.    -Leukocytosis likely secondary to steroid use. Afebrile. WBC trended up. blood culture, sputum culture-negative so far.     -History of PE: Patient takes Coumadin at home. Continue heparin drip for now.     -Chronic diastolic heart failure:  Echocardiogram pending     -BANDAR on CKD stage 3: Creatinine getting worse 2.87 today. Urine output 1.3 L. On bumex gtt.     -Essential hypertension: On IV labetalol for now. Will resume home medications as appropriate. .      -GERD: On IV Protonix 40 mg daily.     -DVT prophylaxis: patient is on heparin drip  -GI prophylaxis-IV Protonix 40 mg daily          Leilani Goldmann, M.D.              Department of Internal Medicine/ Critical care  [facility])             3/2/2020, 7:33 AM

## 2020-03-02 NOTE — PROGRESS NOTES
Nephrology Progress Note      SUBJECTIVE       patient was seen and examined in the ICU. Renal function continues to deteriorate. However, the patient does have a urine output of at least 100 mL an hour on the intravenous Bumex infusion drip at 2 mg an hour, maximum dose. She continues on a ventilator. The patient was weaned off her paralytic but now is back on the paralytic and sedation. She continues to have edema of the extremities. FiO2 is 40% on the ventilator. The patient is becoming more fluid overloaded despite the maximum dose of loop diuretic drip with the weight up 7 kg about since admission. Initiation of Levophed being considered. The patient's 2 sons were in the room and I discussed at length the patient's significant illness, worsening renal function and ejection in multiple organ failure in the setting of the influenza a period of high mortality rate based on the patient's development of acute kidney injury, acute respiratory failure in the setting of influenza A and underlying significant comorbid conditions. They expressed understanding and questions were answered to the best of my ability.          OBJECTIVE      CURRENT TEMPERATURE:  Temp: 97.9 °F (36.6 °C)  MAXIMUM TEMPERATURE OVER 24HRS:  Temp (24hrs), Av.6 °F (36.4 °C), Min:97.5 °F (36.4 °C), Max:97.9 °F (36.6 °C)    CURRENT RESPIRATORY RATE:  Resp: 23  CURRENT PULSE:  Pulse: 120  CURRENT BLOOD PRESSURE:  BP: (!) 107/44  24HR BLOOD PRESSURE RANGE:  Systolic (46QGT), QDN:021 , Min:107 , GWS:025   ; Diastolic (54PSP), YBL:21, Min:44, Max:51    24HR INTAKE/OUTPUT:      Intake/Output Summary (Last 24 hours) at 3/2/2020 1718  Last data filed at 3/2/2020 1700  Gross per 24 hour   Intake 2883.2 ml   Output 2300 ml   Net 583.2 ml     WEIGHT :  Patient Vitals for the past 96 hrs (Last 3 readings):   Weight   20 0400 159 lb 9.8 oz (72.4 kg)   20 0400 154 lb 12.2 oz (70.2 kg)   20 0500 146 lb 13.2 oz (66.6 kg)

## 2020-03-02 NOTE — PLAN OF CARE
Problem: Fluid Volume - Imbalance:  Goal: Absence of imbalanced fluid volume signs and symptoms  Description  Absence of imbalanced fluid volume signs and symptoms  Outcome: Ongoing     Problem: Skin Integrity/Risk  Goal: No skin breakdown during hospitalization  Outcome: Ongoing  Goal: Wound healing  Outcome: Ongoing     Problem: Nutrition  Goal: Optimal nutrition therapy  Outcome: Ongoing     Problem: SKIN INTEGRITY  Goal: Skin integrity is maintained or improved  3/2/2020 0610 by To Bhakta RN  Outcome: Ongoing     Problem: Falls - Risk of:  Goal: Will remain free from falls  Description  Will remain free from falls  Outcome: Ongoing  Goal: Absence of physical injury  Description  Absence of physical injury  Outcome: Ongoing     Problem: Risk for Impaired Skin Integrity  Goal: Tissue integrity - skin and mucous membranes  Description  Structural intactness and normal physiological function of skin and  mucous membranes.   Outcome: Ongoing

## 2020-03-03 NOTE — PROGRESS NOTES
SUBJECTIVE    Patient was seen and examined. Patient is on Bumex drip and making close to 200 mL of urine per hour. Currently the rate of Bumex drip is 2 mg/h  Patient remains vent dependent. Her current FiO2 is 40%  Her blood pressure is labile currently off pressors  Acute renal failure is evolving and creatinine is 3.2 today    OBJECTIVE      CURRENT TEMPERATURE:  Temp: 97.9 °F (36.6 °C)  MAXIMUM TEMPERATURE OVER 24HRS:  Temp (24hrs), Av.5 °F (36.4 °C), Min:95.9 °F (35.5 °C), Max:97.9 °F (36.6 °C)    CURRENT RESPIRATORY RATE:  Resp: 27  CURRENT PULSE:  Pulse: 118  CURRENT BLOOD PRESSURE:  BP: (!) 103/50  24HR BLOOD PRESSURE RANGE:  Systolic (55OUL), KNX:195 , Min:90 , GBQ:555   ; Diastolic (42UCV), LSI:84, Min:49, Max:60    24HR INTAKE/OUTPUT:      Intake/Output Summary (Last 24 hours) at 3/3/2020 0905  Last data filed at 3/3/2020 9260  Gross per 24 hour   Intake 2773.67 ml   Output 2775 ml   Net -1.33 ml     WEIGHT :  Patient Vitals for the past 96 hrs (Last 3 readings):   Weight   20 0600 159 lb 2.8 oz (72.2 kg)   20 0400 159 lb 9.8 oz (72.4 kg)   20 0400 154 lb 12.2 oz (70.2 kg)     PHYSICAL EXAM      GENERAL APPEARANCE: Intubated and sedated. Not much response to physical stimuli   sKIN: Warm to touch and no erythema  ENT: No thrush or pharyngeal congestion  NECK:   No JVD or carotid bruit.   PULMONARY: Bilateral air entry and clear to auscultation no crackles or wheezes  CADRDIOVASCULAR: S1 and S2 audible no S3.   ABDOMEN: Soft and nontender bowel sounds are positive  EXTREMITIES: Trace edema    CURRENT MEDICATIONS      albuterol (PROVENTIL) nebulizer solution 150 mg, Continuous  labetalol (NORMODYNE;TRANDATE) injection 10 mg, Q4H PRN  methylPREDNISolone sodium (SOLU-MEDROL) injection 40 mg, Daily  oseltamivir 6mg/ml (TAMIFLU) suspension 30 mg, Daily  fentaNYL 20 mcg/mL Infusion, Continuous  aspirin chewable tablet 81 mg, Daily  atorvastatin (LIPITOR) tablet 40 mg, Nightly  sodium bicarbonate 75 mEq in sodium chloride 0.45 % 1,000 mL infusion, Continuous  bumetanide (BUMEX) 12.5 mg in sodium chloride 0.9 % 125 mL infusion, Continuous  docusate (COLACE) 50 MG/5ML liquid 100 mg, BID  glucose (GLUTOSE) 40 % oral gel 15 g, PRN  dextrose 50 % IV solution, PRN  glucagon (rDNA) injection 1 mg, PRN  dextrose 5 % solution, PRN  norepinephrine (LEVOPHED) 16 mg in dextrose 5% 250 mL infusion, Continuous  insulin lispro (HUMALOG) injection vial 0-12 Units, Q4H  sodium chloride flush 0.9 % injection 10 mL, 2 times per day  sodium chloride flush 0.9 % injection 10 mL, PRN  acetaminophen (TYLENOL) tablet 650 mg, Q6H PRN    Or  acetaminophen (TYLENOL) suppository 650 mg, Q6H PRN  polyethylene glycol (GLYCOLAX) packet 17 g, Daily PRN  promethazine (PHENERGAN) tablet 12.5 mg, Q6H PRN    Or  ondansetron (ZOFRAN) injection 4 mg, Q6H PRN  potassium chloride 10 mEq/100 mL IVPB (Peripheral Line), PRN  pantoprazole (PROTONIX) injection 40 mg, Daily    And  sodium chloride (PF) 0.9 % injection 10 mL, Daily  heparin (porcine) injection 3,940 Units, PRN  heparin (porcine) injection 1,970 Units, PRN  heparin 25,000 units in dextrose 5% 250 mL infusion, Continuous  midazolam (VERSED) 100 mg in dextrose 5% 100 mL infusion, Continuous  cisatracurium besylate (NIMBEX) 200 mg in sodium chloride 0.9 % 100 mL infusion, Continuous  fentaNYL (SUBLIMAZE) injection 25 mcg, Q1H PRN          LABS      CBC:   Recent Labs     03/01/20  0350 03/02/20  0431 03/03/20  0516   WBC 19.8* 23.9* 18.1*   RBC 3.34* 3.93* 3.88*   HGB 10.2* 11.8* 12.1   HCT 33.7* 39.1 37.1   .9 99.5 95.6   MCH 30.5 30.0 31.2   MCHC 30.3 30.2 32.6   RDW 14.6* 14.9* 15.3*   * 125* See Reflexed IPF Result   MPV 10.6 10.9 NOT REPORTED      BMP:   Recent Labs     03/01/20  0350 03/01/20  1709 03/02/20  0431 03/03/20  0516    136 135 136   K 4.2 4.2 4.6 4.5    103 98 95*   CO2 19* 23 22 26   BUN 54*  --  72* 87*   CREATININE

## 2020-03-03 NOTE — PROGRESS NOTES
Critical Care Team - Daily Progress Note      Date and time: 3/3/2020 7:20 AM  Patient's name: Martha Malloy Record Number: [id number]  Patient's account/billing number: [de-identified]  Patient's YOB: 1945  Age: 67 y.o. Date of Admission: 2/28/2020 12:38 PM  Length of stay during current admission: 4      Primary Care Physician: Juan Gruber  ICU Attending Physician: Shakila Valencia     Code Status: Full Code    Reason for ICU admission: Acute respiratory failure    Subjective:     OVERNIGHT EVENTS:   Patient seen and examined bedside. Blood pressure 130/80, HR-117 this am. Continued on Nimbex drip and midazolam.  Continued on heparin drip for PE. Vent settings PRVC RR-22//PEEP 8/FiO2 40%   ABG this morning pH 7.2/PCO2 57.7/bicarb 25.7  Afebrile. WBC trended down. blood culture, urine culture negative so far. Creatinine getting worse 3.25 . Urine output 2.8 L. On bumex gtt currently. No other acute issues overnight.     AWAKE & FOLLOWING COMMANDS:  [x] No   [] Yes    CURRENT VENTILATION STATUS:     [x] Ventilator  [] BIPAP  [] Nasal Cannula [] Room Air      IF INTUBATED, ET TUBE MARKING AT LOWER LIP:       cms    SECRETIONS Amount:  [] Small [x] Moderate  [] Large  [] None  Color:     [x] White [] Colored  [] Bloody      PARALYZED:  [] No    [x] Yes    DIARRHEA:                [x] No                [] Yes  (C. Difficile status: [] positive                                                                                                                       [] negative                                                                                                                     [] pending)    VASOPRESSORS:  [x] No    [] Yes    If yes -   [] Levophed       [] Dopamine     [] Vasopressin       [] Dobutamine  [] Phenylephrine         [] Epinephrine    CENTRAL LINES:     [] No   [x] Yes   (Date of Insertion:   )           If yes -     [] Right IJ     [] Left IJ [] Right Femoral [] Left Femoral                   [] Right Subclavian [] Left Subclavian       GRIER'S CATHETER:   [] No   [x] Yes  (Date of Insertion:   )     URINE OUTPUT:            [x] Good   [] Low              [] Anuric    REVIEW OF SYSTEMS:  Limited as patient is intubated and sedated. OBJECTIVE:     VITAL SIGNS:  BP (!) 103/50   Pulse 122   Temp 97.9 °F (36.6 °C) (Oral)   Resp 23   Ht 5' (1.524 m)   Wt 159 lb 9.8 oz (72.4 kg)   SpO2 99%   BMI 31.17 kg/m²   Tmax over 24 hours:  Temp (24hrs), Av.5 °F (36.4 °C), Min:95.9 °F (35.5 °C), Max:97.9 °F (36.6 °C)      Patient Vitals for the past 8 hrs:   BP Temp Temp src Pulse Resp SpO2   20 0700 -- -- -- 122 23 99 %   20 0600 -- -- -- 121 24 100 %   20 0500 -- -- -- 121 24 100 %   20 0432 -- -- -- 120 24 --   20 0400 (!) 103/50 97.9 °F (36.6 °C) Oral 119 24 99 %   20 0300 -- -- -- 119 24 100 %   20 0245 -- -- -- 118 24 100 %   20 0230 -- -- -- 119 24 100 %   20 0215 -- -- -- 119 24 99 %   20 0200 -- -- -- 118 24 99 %   20 0145 -- -- -- 119 24 99 %   20 0130 -- -- -- 125 27 99 %   20 0115 -- -- -- 123 24 99 %   20 0100 -- 95.9 °F (35.5 °C) Oral 122 24 99 %   20 0045 -- -- -- 121 24 99 %   20 0030 -- -- -- 121 24 99 %   20 0015 -- -- -- 120 24 99 %   20 0000 (!) 129/57 -- -- 122 24 99 %   20 2327 -- -- -- -- 23 100 %         Intake/Output Summary (Last 24 hours) at 3/3/2020 0720  Last data filed at 3/3/2020 0600  Gross per 24 hour   Intake 2250.2 ml   Output 2875 ml   Net -624.8 ml     Date 20 0000 - 20 2359   Shift 9793-8936 0526-6141 4990-4517 24 Hour Total   INTAKE   Shift Total(mL/kg)       OUTPUT   Urine(mL/kg/hr) 950   950   Shift Total(mL/kg) 950(13.1)   950(13.1)   Weight (kg) 72.4 72.4 72.4 72.4     Wt Readings from Last 3 Encounters:   20 159 lb 9.8 oz (72.4 kg)     Body mass index is 31.17 kg/m².         PHYSICAL EXAM:  Constitutional: intubated, sedated, paralyzed. HEENT: PERRLA, EOMI, sclera clear, anicteric  Respiratory: clear to auscultation, no wheezes or rales and unlabored breathing. Cardiovascular: regular rate and rhythm, normal S1, S2, no murmur noted and 2+ pulses throughout  Abdomen: soft, nontender, nondistended, no masses or organomegaly  Extremities:  peripheral pulses normal, no pedal edema,.       Any additional physical findings:      MEDICATIONS:  Scheduled Meds:   methylPREDNISolone  40 mg Intravenous Daily    oseltamivir 6mg/ml  30 mg Oral Daily    aspirin  81 mg Oral Daily    atorvastatin  40 mg Oral Nightly    docusate  100 mg Per NG tube BID    insulin lispro  0-12 Units Subcutaneous Q4H    sodium chloride flush  10 mL Intravenous 2 times per day    pantoprazole  40 mg Intravenous Daily    And    sodium chloride (PF)  10 mL Intravenous Daily     Continuous Infusions:   albuterol 150 mg (03/03/20 0359)    fentaNYL 75 mcg/hr (03/02/20 2205)    sodium bicarbonate infusion 75 mL/hr at 03/02/20 2355    bumetanide 0.1 mg/mL infusion 2 mg/hr (03/03/20 0239)    dextrose      norepinephrine Stopped (03/03/20 0518)    heparin (porcine) 8 Units/kg/hr (03/03/20 0553)    midazolam 3 mg/hr (03/02/20 1816)    cisatracurium (NIMBEX) infusion 3 mcg/kg/min (03/02/20 2205)     PRN Meds:   labetalol, 10 mg, Q4H PRN  glucose, 15 g, PRN  dextrose, 12.5 g, PRN  glucagon (rDNA), 1 mg, PRN  dextrose, 100 mL/hr, PRN  sodium chloride flush, 10 mL, PRN  acetaminophen, 650 mg, Q6H PRN    Or  acetaminophen, 650 mg, Q6H PRN  polyethylene glycol, 17 g, Daily PRN  promethazine, 12.5 mg, Q6H PRN    Or  ondansetron, 4 mg, Q6H PRN  potassium chloride, 10 mEq, PRN  heparin (porcine), 60 Units/kg, PRN  heparin (porcine), 30 Units/kg, PRN  fentanNYL, 25 mcg, Q1H PRN        SUPPORT DEVICES: [x] Ventilator [] BIPAP  [] Nasal Cannula [] Room Air    VENT SETTINGS (Comprehensive) (if applicable):  Vent Information  $Ventilation: $Subsequent Day  Ventilator Started: Yes  Skin Assessment: Clean, dry, & intact  Equipment Changed: HME  Vent Type: Servo i  Vent Mode: PRVC  Vt Ordered: 360 mL  Rate Set: 24 bmp  FiO2 : 40 %  Sensitivity: 5  PEEP/CPAP: 8  I Time/ I Time %: 0.65 s  Cuff Pressure (cm H2O): 25 cm H2O  Humidification Source: HME  Nitric Oxide/Epoprostenol In Use?: No  Additional Respiratory  Assessments  Pulse: 122  Resp: 23  SpO2: 99 %  End Tidal CO2: 33 (%)  pCO2 (TCOM, mmHg): 38 mmHg  Position: Semi-Handley's  Humidification Source: HME  Oral Care Completed?: Yes  Oral Care: Mouth suctioned  Subglottic Suction Done?: Yes  Cuff Pressure (cm H2O): 25 cm H2O  Skin barrier applied: No    ABGs:     Lab Results   Component Value Date    MVG0XWM 32 03/03/2020    FIO2 40.0 03/03/2020     Lactic Acid:   Lab Results   Component Value Date    LACTA NOT REPORTED 02/28/2020         DATA:  Complete Blood Count:   Recent Labs     03/01/20  0350 03/02/20  0431 03/03/20  0516   WBC 19.8* 23.9* 18.1*   HGB 10.2* 11.8* 12.1   .9 99.5 95.6   * 125* See Reflexed IPF Result   RBC 3.34* 3.93* 3.88*   HCT 33.7* 39.1 37.1   MCH 30.5 30.0 31.2   MCHC 30.3 30.2 32.6   RDW 14.6* 14.9* 15.3*   MPV 10.6 10.9 NOT REPORTED        PT/INR:  No results found for: PROTIME, INR  PTT:    Lab Results   Component Value Date    APTT 63.8 03/03/2020       Basal Metabolic Profile:   Recent Labs     03/01/20  0350 03/01/20  1709 03/02/20  0431 03/03/20  0516    136 135 136   K 4.2 4.2 4.6 4.5   BUN 54*  --  72* 87*   CREATININE 2.22*  --  2.87* 3.25*    103 98 95*   CO2 19* 23 22 26      Magnesium: No results found for: MG  Phosphorus: No results found for: PHOS  S. Calcium:  Recent Labs     03/03/20  0516   CALCIUM 7.7*     S. Ionized Calcium:No results for input(s): IONCA in the last 72 hours.       Urinalysis:   Lab Results   Component Value Date    NITRU NEGATIVE 03/01/2020    COLORU YELLOW 03/01/2020    PHUR 5.0 03/01/2020    WBCUA 5 TO 10 03/01/2020    RBCUA TOO NUMEROUS TO COUNT 03/01/2020    MUCUS NOT REPORTED 03/01/2020    TRICHOMONAS NOT REPORTED 03/01/2020    YEAST NOT REPORTED 03/01/2020    BACTERIA NOT REPORTED 03/01/2020    SPECGRAV 1.014 03/01/2020    LEUKOCYTESUR NEGATIVE 03/01/2020    UROBILINOGEN Normal 03/01/2020    BILIRUBINUR NEGATIVE 03/01/2020    GLUCOSEU TRACE 03/01/2020    KETUA NEGATIVE 03/01/2020    AMORPHOUS NOT REPORTED 03/01/2020       CARDIAC ENZYMES: No results for input(s): CKMB, CKMBINDEX, TROPONINI in the last 72 hours. Invalid input(s): CKTOTAL;3  BNP: No results for input(s): BNP in the last 72 hours. LFTS  No results for input(s): ALKPHOS, ALT, AST, BILITOT, BILIDIR, LABALBU in the last 72 hours. AMYLASE/LIPASE/AMMONIA  No results for input(s): AMYLASE, LIPASE, AMMONIA in the last 72 hours. Last 3 Blood Glucose:   Recent Labs     02/29/20  1307 03/01/20  0350 03/02/20  0431 03/03/20  0516   GLUCOSE 122* 181* 190* 169*      HgBA1c:    Lab Results   Component Value Date    LABA1C 7.1 02/29/2020         TSH:  No results found for: TSH  ANEMIA STUDIES  No results for input(s): LABIRON, TIBC, FERRITIN, [id number], FOLATE, OCCULTBLD in the last 72 hours. Cultures during this admission:     Blood cultures:                 [] None drawn      [x] Negative             []  Positive (Details:  )  Urine Culture:                   [] None drawn      [x] Negative             []  Positive (Details:  )        ASSESSMENT:     Principal Problem:    Acute hypercapnic respiratory failure (HCC)  Active Problems:    Respiratory failure (HCC)    CKD (chronic kidney disease) stage 3, GFR 30-59 ml/min (HCC)    Pulmonary emboli (HCC)    Diastolic CHF ([address]    Hypertension    Pulmonary hypertension ([facility] [address]  Resolved Problems:    * No resolved hospital problems. *        PLAN:     · Acute hypoxic hypercapnic respiratory failure:  vent settings RR-22,/PEEP 8/FiO2 40%.   ABG this morning pH 7.3/PCO2 54.9/bicarb 30  On Nimbex drip and midazolam.

## 2020-03-03 NOTE — PATIENT CARE CONFERENCE
Palliative Care Family Conference    Patient:  Emmy Barbour  Room: 0108/0108-01    Attended By: Dr. Corine Bower care attending, critical care resident Dr. Yocasta Forman, patient's nurse Chula Campos, patient's son Royal Beard [facility] ) and patient's cousin Alireza Mcknight    Reason for meeting:Routine meeting  Disease progression  Discuss goals of care  Treatment options/plans  Provide clinical updates and answer questions  Share information and provider education for the family  Listen to patient/family concerns  Assess family understanding, concerns, and coping  Communicate update after a long length of stay  Build trust  Provide emotional support to the family  Elicit patient's goals and values, and use these to establish or modify goals of care  Other major care decisions  CODE STATUS     Conference Summary:  The meeting was held in the conference room on the first floor in the MICU    The meeting was conducted by me and critical care resident Dr. Yocasta Forman and attended by patient's nurse Chula Campos, patient's son Royal Beard who also is patient's POA for [facility] and patient's cousin Alireza Mcknight    We discussed patient's current medical conditions in detail with the family    Dr. Yocasta Forman explained to Royal Beard and Alireza Mcknight that patient has severe COPD for which she had to be intubated and also her kidney function is declining    I explained the different types of codes to Royal Beard and Cosmo Lulujovani stated that he wanted to discuss with his brothers before making any changes and we respected his wishes     Royal Beard had many questions and they were appropriately answered    Alireza Mcknight did state that she has been taking care of the patient and has been with the patient during her doctor visits    Preeti and Royal Beard were also told that patient had been needing pressors off and on to support her blood pressure    We also explained to Tao that if patient needed dialysis then she has to be in as stable condition to have the dialysis done and Tao stated that he understands about it and did discuss this with patient's nephrologist    We offered comfort and emotional support to the family        Conclusion/Plan: We will follow-up with the family for further goals of care  We will continue to provide comfort and support to the patient and family    The total time I spent in face-to-face family meeting discussing goals of care, CODE STATUS and other major issues was more than 30 minutes  The note has been dictated by dragon, typing errors may be a possibility.      Electronically signed by   Leonel Ly MD  Palliative Care Team  on 3/3/2020 at 3:57 PM

## 2020-03-03 NOTE — PROGRESS NOTES
[facility] Nurse  Consult Note       NAME:  Miguel Worthy  MEDICAL RECORD NUMBER:  [id number]  AGE: 67 y.o. GENDER: female  : 1945  TODAY'S DATE:  3/3/2020    Subjective:     Reason for WOCN Evaluation and Assessment: Pressure injury prevention      Miguel Worthy is a 67 y.o. female referred by:   [x] Physician  [] Nursing  [] Other:     Risk factors[de-identified] diabetes, poor glucose control, chronic pressure, decreased mobility, shear force, smoking, decreased tissue oxygenation, immunosuppression, anticoagulation therapy, incontinence of stool and incontinence of urine        PAST MEDICAL HISTORY        Diagnosis Date    [facility]) [facility]     Diastolic CHF ([facility] 6981    Hx of blood clots     Hypertension        PAST SURGICAL HISTORY    Past Surgical History:   Procedure Laterality Date    CHOLECYSTECTOMY      ENDOSCOPY, COLON, DIAGNOSTIC      FRACTURE SURGERY         FAMILY HISTORY    No family history on file.     SOCIAL HISTORY    Social History     Tobacco Use    Smoking status: Former Smoker     Packs/day: 1.50     Years: 60.00     Pack years: 90.00     Types: Cigarettes     Last attempt to quit: 2020     Years since quittin.0   Substance Use Topics    Alcohol use: Never     Frequency: Never    Drug use: Never       ALLERGIES    Allergies   Allergen Reactions    Latex     Amoxicillin     Augmentin [Amoxicillin-Pot Clavulanate]     Biaxin [Clarithromycin]     Doxycycline     Levaquin [Levofloxacin]     Losartan     Macrodantin [Nitrofurantoin]     Theophyllines            Objective:      BP (!) 103/50   Pulse 118   Temp 97.9 °F (36.6 °C) (Oral)   Resp 27   Ht 5' (1.524 m)   Wt 159 lb 2.8 oz (72.2 kg)   SpO2 100%   BMI 31.09 kg/m²   Chema Risk Score: Chema Scale Score: 9    LABS    CBC:   Lab Results   Component Value Date    WBC 18.1 2020    RBC 3.88 2020    HGB 12.1 2020     CMP:  Albumin:    Lab Results   Component Value Date    LABALBU 2.8 02/28/2020     PT/INR:  No results found for: PROTIME, INR  HgBA1c:    Lab Results   Component Value Date    LABA1C 7.1 02/29/2020     PTT: No components found for: LABPTT      Assessment:     Patient Active Problem List   Diagnosis    Respiratory failure (HCC)    Acute hypercapnic respiratory failure (HCC)    CKD (chronic kidney disease) stage 3, GFR 30-59 ml/min (HCC)    Pulmonary emboli (HCC)    Diastolic CHF ([facility] [address] [facility]    Hypertension    Pulmonary hypertension ([facility] [address]       Measurements:   bilateral hand skin tears covered by Mepilex foam. Did not disrupt dressings as this would be counterproductive to healing a skin tear. Numerous bruises and scars to the BUE. Sacrococcygeal area is intact. Response to treatment:  Well tolerated by patient. Plan:     Plan of Care: Turn every 2 hours  Float heels of of bed with pillows under calves    Mepilex sacrum dressing to sacrococcygeal area. Peel back dressing, inspect skin beneath, re-secure. Change every 72 hours and prn wrinkles, soilage. Discontinue Sacral Mepilex if repeatedly soiled by incontinence. Routine incontinence care with barrier cloths and zinc oxide cream.   Apply zinc oxide cream BID and prn incontinence. moisture wicking under pads vs cloth backed briefs  Bilateral hand skin tears: silicone foam 6P4\" dressings. Leave in place for 5 days.      Specialty Bed Required : Yes   [] Low Air Loss   [x] Pressure Redistribution  [] Fluid Immersion  [] Bariatric  [] Total Pressure Relief  [] Other:     Discharge Plan:  TBD    Patient/Caregiver Teaching:  Level of patientunderstanding able to:     [] Indicates understanding       [] Needs reinforcement  [] Unsuccessful      [] Verbal Understanding  [] Demonstrated understanding       [x] No evidence of learning  [] Refused teaching         [] N/A       Electronically signed by Araceli Horton RN, CWON on 3/3/2020 at 10:00 AM

## 2020-03-03 NOTE — PLAN OF CARE
Problem: OXYGENATION/RESPIRATORY FUNCTION  Goal: Patient will maintain patent airway  3/3/2020 0736 by Stephany Zuniga RCP  Outcome: Ongoing  3/2/2020 1907 by Darienl Tomlinson RN  Outcome: Ongoing  Goal: Patient will achieve/maintain normal respiratory rate/effort  Description  Respiratory rate and effort will be within normal limits for the patient  3/3/2020 0736 by Stephany Zuniga RCP  Outcome: Ongoing  3/2/2020 1907 by Darinel Tomlinson RN  Outcome: Ongoing     Problem: MECHANICAL VENTILATION  Goal: Patient will maintain patent airway  3/3/2020 0736 by Stephany Zuniga RCP  Outcome: Ongoing  3/2/2020 1907 by Darinel Tomlinson RN  Outcome: Ongoing  Goal: Oral health is maintained or improved  3/3/2020 0736 by Stephany Zuniga RCP  Outcome: Ongoing  3/2/2020 1907 by Darinel Tomlinson RN  Outcome: Ongoing  Goal: ET tube will be managed safely  3/3/2020 0736 by Stephany Zuniga RCP  Outcome: Ongoing  3/2/2020 1907 by Darinel Tomlinson RN  Outcome: Ongoing  Goal: Ability to express needs and understand communication  3/3/2020 0736 by Stephany Zuniga RCP  Outcome: Ongoing  3/2/2020 1907 by Darinel Tomlinson RN  Outcome: Ongoing  Goal: Mobility/activity is maintained at optimum level for patient  3/3/2020 0736 by Stephany Zuniga RCP  Outcome: Ongoing  3/2/2020 1907 by Darinel Tomlinson RN  Outcome: Ongoing     Problem: SKIN INTEGRITY  Goal: Skin integrity is maintained or improved  3/3/2020 0736 by Stephany Zuniga RCP  Outcome: Ongoing  3/3/2020 0643 by Janessa Ledbetter RN  Outcome: Ongoing  3/2/2020 1907 by Darinel Tomlinson RN  Outcome: Ongoing

## 2020-03-03 NOTE — CONSULTS
hypertension. Palliative care consulted for CODE STATUS, goals of care and family support.     Active Hospital Problems    Diagnosis Date Noted    Respiratory failure ([facility] [J96.90] 2020    Acute hypercapnic respiratory failure (HCC) [J96.02] 2020    CKD (chronic kidney disease) stage 3, GFR 30-59 ml/min (HCC) [N18.3] 2020    Pulmonary emboli (HCC) [I26.99]     Diastolic [facility]F ([facility]) [C35.67] 2020    Hypertension [I10] 2020    Pul[facility] [I27.20] 2020       PAST MEDICAL HISTORY      Diagnosis Date    COPD (chronic obstructive pulmonary disease) ([facility]     Diastolic CHF ([facility]     Hx of blood clots     Hypertension        PAST SURGICAL HISTORY  Past Surgical History:   Procedure Laterality Date    CHOLECYSTECTOMY      ENDOSCOPY, COLON, DIAGNOSTIC      FRACTURE SURGERY         SOCIAL HISTORY  Social History     Tobacco Use    Smoking status: Former Smoker     Packs/day: 1.50     Years: 60.00     Pack years: 90.00     Types: Cigarettes     Last attempt to quit: 2020     Years since quittin.0   Substance Use Topics    Alcohol use: Never     Frequency: Never    Drug use: Never       ALLERGIES  Allergies   Allergen Reactions    Latex     Amoxicillin     Augmentin [Amoxicillin-Pot Clavulanate]     Biaxin [Clarithromycin]     Doxycycline     Levaquin [Levofloxacin]     Losartan     Macrodantin [Nitrofurantoin]     Theophyllines          MEDICATIONS  Current Medications    methylPREDNISolone  40 mg Intravenous Daily    oseltamivir 6mg/ml  30 mg Oral Daily    aspirin  81 mg Oral Daily    atorvastatin  40 mg Oral Nightly    docusate  100 mg Per NG tube BID    insulin lispro  0-12 Units Subcutaneous Q4H    sodium chloride flush  10 mL Intravenous 2 times per day    pantoprazole  40 mg Intravenous Daily    And    sodium chloride (PF)  10 mL Intravenous Daily     labetalol, glucose, dextrose, glucagon (rDNA), dextrose, sodium chloride flush, acetaminophen **OR** acetaminophen, polyethylene glycol, promethazine **OR** ondansetron, potassium chloride, heparin (porcine), heparin (porcine), fentanNYL  IV Drips/Infusions   albuterol 150 mg (03/03/20 0359)    fentaNYL 75 mcg/hr (03/02/20 2205)    sodium bicarbonate infusion 75 mL/hr at 03/02/20 2355    bumetanide 0.1 mg/mL infusion 2 mg/hr (03/03/20 0239)    dextrose      norepinephrine Stopped (03/03/20 0518)    heparin (porcine) 8 Units/kg/hr (03/03/20 0553)    midazolam 3 mg/hr (03/02/20 1816)    cisatracurium (NIMBEX) infusion 2 mcg/kg/min (03/03/20 0845)     Home Medications  No current facility-administered medications on file prior to encounter. No current outpatient medications on file prior to encounter. Data         BP (!) 103/50   Pulse 118   Temp 97.9 °F (36.6 °C) (Oral)   Resp 27   Ht 5' (1.524 m)   Wt 159 lb 2.8 oz (72.2 kg)   SpO2 100%   BMI 31.09 kg/m²     Wt Readings from Last 3 Encounters:   03/03/20 159 lb 2.8 oz (72.2 kg)        Code Status: Full Code     ADVANCED CARE PLANNING:  Patient has capacity for medical decisions: no  Health Care Power of : yes  Living Will: yes     Personal, Social, and Family History  Marital Status:   Living situation:with family:  son  Does patient understand diagnosis/treatment? no  Does caregiver understand diagnosis/treatment? not asked      Assessment        REVIEW OF SYSTEMS    ROS unable to be done, patient intubated and sedated    PHYSICAL ASSESSMENT:  Constitutional: Intubated, Sedated  Head: Normocephalic and atraumatic. Eyes: EOM are normal. Pupils are equal, round   Neck: Normal range of motion. Neck supple. No tracheal deviation present. Cardiovascular: Normal rate and regular rhythm, S1, S2, no murmur   Pulmonary/Chest: On mechanical ventilator, no rales or wheezes. Abdomen: Soft. No tenderness, not distended, no ascites, no organomegaly   Musculoskeletal: Trace edema lower ext. you for allowing Palliative Care to participate in the care of Ms. Lambert . This note has been dictated by dragon, typing errors may be a possibility.     The total time I spent in seeing the patient, discussing goals of care, advanced directives, code status and other major issues was more than 55 minutes      Electronically signed by   Salvador Chavez MD  Palliative Care Team  on 3/3/2020 at 9:22 AM    Palliative care office: [phone]

## 2020-03-03 NOTE — CARE COORDINATION
Family at bedside, palliative following, per patients nurse palliative planning to see family tonight for further discussion regarding care, may need LTACH choice tomorrow, continue to follow for needs

## 2020-03-04 NOTE — PROGRESS NOTES
152*   CALCIUM 7.7* 7.7* 7.9*    FOSTER:   Lab Results   Component Value Date    FOSTER NEGATIVE 03/01/2020       SPEP:   Lab Results   Component Value Date    PROT 5.0 02/28/2020    PATH ELECTRONICALLY SIGNED. Adam Lugo M.D. 03/02/2020     UPEP: No results found for: TPU   HEPBSAG:  Lab Results   Component Value Date    HEPBSAG NONREACTIVE 03/01/2020     HEPCAB:  Lab Results   Component Value Date    HEPCAB NONREACTIVE 03/01/2020     C3:   Lab Results   Component Value Date    C3 70 (L) 03/01/2020     C4:   Lab Results   Component Value Date    C4 29 03/01/2020   URINE SODIUM:    Lab Results   Component Value Date    LINNETTE 32 03/01/2020    URINE CREATININE:    Lab Results   Component Value Date    LABCREA 93.0 03/01/2020   URINALYSIS:  U/A:   Lab Results   Component Value Date    NITRU NEGATIVE 03/01/2020    COLORU YELLOW 03/01/2020    PHUR 5.0 03/01/2020    WBCUA 5 TO 10 03/01/2020    RBCUA TOO NUMEROUS TO COUNT 03/01/2020    MUCUS NOT REPORTED 03/01/2020    TRICHOMONAS NOT REPORTED 03/01/2020    YEAST NOT REPORTED 03/01/2020    BACTERIA NOT REPORTED 03/01/2020    SPECGRAV 1.014 03/01/2020    LEUKOCYTESUR NEGATIVE 03/01/2020    UROBILINOGEN Normal 03/01/2020    BILIRUBINUR NEGATIVE 03/01/2020    GLUCOSEU TRACE 03/01/2020    KETUA NEGATIVE 03/01/2020    AMORPHOUS NOT REPORTED 03/01/2020     ANTIGBM:  Lab Results   Component Value Date    GBMABIGG 10 03/01/2020         RADIOLOGY      Reviewed as available. ASSESSMENT      1. Acute kidney injury with oliguria. Patient responded to Bumex drip. Bumex infusion was stopped this morning, due to increasing BUN. And is partly due to being on steroids her creatinine is showing some improvement    2. Respiratory failure. Patient is a still on ventilator and on 40% FiO2  3. Septic shock with labile blood pressure: Patient is on phenylephrine  4. Metabolic acidosis: Corrected. Patient is on bicarbonate drip  5. Advanced COPD  6.   Influenza pneumonia  PLAN

## 2020-03-04 NOTE — PROGRESS NOTES
PRN  glucagon (rDNA), 1 mg, PRN  dextrose, 100 mL/hr, PRN  sodium chloride flush, 10 mL, PRN  acetaminophen, 650 mg, Q6H PRN    Or  acetaminophen, 650 mg, Q6H PRN  polyethylene glycol, 17 g, Daily PRN  promethazine, 12.5 mg, Q6H PRN    Or  ondansetron, 4 mg, Q6H PRN  potassium chloride, 10 mEq, PRN  heparin (porcine), 60 Units/kg, PRN  heparin (porcine), 30 Units/kg, PRN  fentanNYL, 25 mcg, Q1H PRN        SUPPORT DEVICES: [x] Ventilator [] BIPAP  [] Nasal Cannula [] Room Air    VENT SETTINGS (Comprehensive) (if applicable):  Vent Information  $Ventilation: $Subsequent Day  Ventilator Started: Yes  Skin Assessment: Clean, dry, & intact  Equipment Changed: HME  Vent Type: Servo i  Vent Mode: PRVC  Vt Ordered: 360 mL  Rate Set: 24 bmp  FiO2 : 40 %  Sensitivity: 5  PEEP/CPAP: 5  I Time/ I Time %: 0.65 s  Cuff Pressure (cm H2O): 25 cm H2O  Humidification Source: HME  Nitric Oxide/Epoprostenol In Use?: No  Additional Respiratory  Assessments  Pulse: 134  Resp: 24  SpO2: 98 %  End Tidal CO2: 33 (%)  pCO2 (TCOM, mmHg): 38 mmHg  Position: Semi-Handley's  Humidification Source: HME  Oral Care Completed?: Yes  Oral Care: Teeth brushed, Suction toothette, Mouth suctioned, Mouth moisturizer  Subglottic Suction Done?: Yes  Cuff Pressure (cm H2O): 25 cm H2O  Skin barrier applied: No    ABGs:     Lab Results   Component Value Date    FEC9ZBM 39 03/04/2020    FIO2 40.0 03/04/2020     Lactic Acid:   Lab Results   Component Value Date    LACTA NOT REPORTED 02/28/2020         DATA:  Complete Blood Count:   Recent Labs     03/02/20  0431 03/03/20  0516 03/04/20  0452   WBC 23.9* 18.1* 14.4*   HGB 11.8* 12.1 11.9   MCV 99.5 95.6 93.9   * See Reflexed IPF Result See Reflexed IPF Result   RBC 3.93* 3.88* 3.92*   HCT 39.1 37.1 36.8   MCH 30.0 31.2 30.4   MCHC 30.2 32.6 32.3   RDW 14.9* 15.3* 15.4*   MPV 10.9 NOT REPORTED NOT REPORTED        PT/INR:  No results found for: PROTIME, INR  PTT:    Lab Results   Component Value Date    APTT Negative             []  Positive (Details:  )        ASSESSMENT:     Principal Problem:    Acute hypercapnic respiratory failure (HCC)  Active Problems:    Respiratory failure (HCC)    CKD (chronic kidney disease) stage 3, GFR 30-59 ml/min (HCC)    Pulmonary emboli (HCC)    Diastolic CHF ([facility]    Hypertension    Pulmonary hypertension ([facility]    Encounter for palliative care    Influenza A  Resolved Problems:    * No resolved hospital problems. *        PLAN:     · Acute hypoxic hypercapnic respiratory failure:  Blood pressure 130/80 HR-120-130 currently. Labile blood pressure. Continued on versad and Nimbex drip. (Trying to wean off). On Rudolph-Synephrine for pressure support  titrated as needed. Vent settings PRVC RR-24//PEEP 5/FiO2 40%   ABG this morning pH 7.4/PCO2 53/bicarb 37  EKG this morning showed sinus tachycardia. · BANDAR on CKD stage 3: Creatinine better 2.83 . BUN got worse. Nephrology notified. Bumex drip stopped for now. Urine output 4.4L. -Hypovolemic shock. No sepsis-labile blood pressure. On Rudolph-Synephrine for pressure support.    -Influenza A: Continued on Tamiflu 30 mg twice daily for 8 doses. RSV panel, sputum culture, blood and urine culture negative so far. Culture.  Droplet precautions.     · COPD exacerbation: Continue DuoNeb, Proventil and IV solumedrol 40mg daily.     · Hyperglycemia secondary to steroid use. Off drip. Medium dose insulin sliding scale. Hypoglycemia protocol.    -Leukocytosis likely secondary to steroid use. Afebrile. WBC trended down. Blood culture, sputum culture-negative so far.       · NSTEMI/type II MI-continued on heparin drip. · History of PE:Continue heparin drip for now.     · Chronic diastolic heart failure:  Echocardiogram showed EF more than 55%, moderate TR and pulmonary insufficiency. Elevated CVP.     · Essential hypertension: Labile blood pressure. On Rudolph-Synephrine for pressure support currently.   Titrated as

## 2020-03-04 NOTE — PROGRESS NOTES
[facility]   Progress Note                    Date:   3/4/2020  Patient name: Reji Sanches  Date of admission:  2/28/2020 12:38 PM  MRN:   [id number]  YOB: 1945  PCP:    Cassandra Recinos    Reason for Admission:  Respiratory failure ([address] [J96.90]    Subjective:       Clinical Changes / Abnormalities: The patient was seen and examined. Patient remains intubated and sedated and paralyzed. Was having tachycardia with Levophed. I/O last 3 completed shifts: In: 3356.4 [I.V.:2475.4; NG/GT:881]  Out: 7774 [Urine:4315]  I/O this shift:  In: 1173 [I.V.:794; NG/GT:379]  Out: 325 [Urine:325]      In: 3454.9 [I.V.:2468. 9; NG/GT:986]  Out: 3120 [Urine:3120]      Intake/Output Summary (Last 24 hours) at 3/4/2020 1322  Last data filed at 3/4/2020 1200  Gross per 24 hour   Intake 3454.88 ml   Output 3480 ml   Net -25.12 ml         I/O since admission:  liters    Medications:   Scheduled Meds:   lansoprazole  30 mg Oral Daily    heparin (porcine)  5,000 Units Subcutaneous 3 times per day    bumetanide  2 mg Intravenous TID    ipratropium-albuterol  1 ampule Inhalation Q4H WA    [START ON 3/5/2020] albuterol  2.5 mg Nebulization BID    oseltamivir 6mg/ml  30 mg Oral Daily    methylPREDNISolone  40 mg Intravenous Daily    aspirin  81 mg Oral Daily    atorvastatin  40 mg Oral Nightly    docusate  100 mg Per NG tube BID    insulin lispro  0-12 Units Subcutaneous Q4H    sodium chloride flush  10 mL Intravenous 2 times per day     Continuous Infusions:   phenylephrine (RICKY-SYNEPHRINE) 50mg/250mL infusion 80 mcg/min (03/04/20 1302)    sodium chloride 20 mL/hr at 03/04/20 1131    sodium chloride 5 mL/hr (03/03/20 1711)    fentaNYL 125 mcg/hr (03/04/20 0829)    dextrose      midazolam 5 mg/hr (03/04/20 0706)    cisatracurium (NIMBEX) infusion 3 mcg/kg/min (03/04/20 1307)     CBC:   Recent Labs     03/02/20  0431 03/03/20  0516 03/04/20  0452   WBC 23.9* 18.1* 14.4*   HGB 11.8* 12.1 11.9   * See Reflexed IPF Result See Reflexed IPF Result     BMP:    Recent Labs     03/02/20  0431 03/03/20  0516 03/04/20  0452    136 137   K 4.6 4.5 4.9   CL 98 95* 91*   CO2 22 26 32*   BUN 72* 87* 95*   CREATININE 2.87* 3.25* 2.83*   GLUCOSE 190* 169* 152*     Hepatic:   No results for input(s): AST, ALT, ALB, BILITOT, ALKPHOS in the last 72 hours. Troponin:  Recent Labs     03/03/20  0516   TROPHS 98*            No results for input(s): TROPONINI in the last 72 hours. Recent Labs     03/03/20 0516   TROPONINT NOT REPORTED     BNP:   No results for input(s): PROBNP in the last 72 hours. No results for input(s): BNP in the last 72 hours. Lipids: No results for input(s): CHOL, HDL in the last 72 hours. Invalid input(s): LDLCALCU  INR: No results for input(s): INR in the last 72 hours. Objective:   Vitals: BP (!) 93/47   Pulse 122   Temp 99.5 °F (37.5 °C) (Oral)   Resp 24   Ht 5' (1.524 m)   Wt 162 lb 4.1 oz (73.6 kg)   SpO2 97%   BMI 31.69 kg/m²    Constitutional: intubated, sedated, paralyzed. HEENT: PERRLA, EOMI, sclera clear, anicteric  Respiratory: clear to auscultation, no wheezes or rales and unlabored breathing. Cardiovascular: regular rate and rhythm, normal S1, S2, no murmur noted and 2+ pulses throughout  Abdomen: soft, nontender, nondistended, no masses or organomegaly  Extremities:  peripheral pulses normal, no pedal edema,. Diagnostic Studies:     EKG: Sinus tachycardia, heart rate 121      Echo 3/3/2020  Extremely Technically difficult and limited study due to patient being on  ventilator and supine. Heart rate in 120's during exam.  Normal left ventricular ejection fraction (>55%). Mild to moderate tricuspid regurgitation. Pulmonic valve not well visualized but Doppler velocities are normal.  Mild to moderate pulmonic insufficiency.   IVC Increased diameter and impaired or no inspiratory variation indicating  elevated RA filling pressure (i.e. CVP) .      Patient's Active Problem List   Principal Problem:    Acute hypercapnic respiratory failure (HCC)  Active Problems:    Respiratory failure (HCC)    CKD (chronic kidney disease) stage 3, GFR 30-59 ml/min (HCC)    Pulmonary emboli (HCC)    Diastolic CHF ([facility] [facility]    Hypertension    Pulmonary hypertension ([facility] [address]    Encounter for palliative care    Influenza A  Resolved Problems:    * No resolved hospital problems. *        Assessment / Acute Cardiac Problems:   1. Type II MI due to COPD exacerbation  2. Influenza A  3. COPD  4. BANDAR on CKD  5. HFpEF per EMR -no echo available  6. H/o PE  7. Pulmonary hypertension  8. BANDAR: Worsening     Plan of Treatment:   1. Troponins peaked at 139  2. Continue aspirin and statin  3. Can discontinue heparin drip as no evidence of wall motion abnormalities on echo  4. Cardiology will sign off  5. Can follow-up outpatient    Will continue to follow    Tung Callaway MD  Fellow, [address]        Attending Cardiologist Addendum: I have reviewed and performed the history, physical, subjective, objective, assessment, and plan with the resident/fellow and agree with the note. I performed the history and physical personally. I have made changes to the note above as needed. Thank you for allowing me to participate in the care of this patient, please do not hesitate to call if you have any questions. Jackie Selby DO, [facility], [address] [facility]  [facility]. [facility]  [phone]

## 2020-03-04 NOTE — PROGRESS NOTES
Palliative Care Progress Note    NAME:  Ever Arellano RECORD NUMBER:  [id number]  AGE: 67 y.o. GENDER: female  : 1945  TODAY'S DATE:  3/4/2020    Reason for Consult:  goals of care  History of Present Illness     The patient is a 67 y.o. Non-/non  female who presents with No chief complaint on file. Referred to Palliative Care by  ?x Physician   ? Nursing  ? Family Request   ? Other:       She was admitted to the critical care service for Respiratory failure ([facility] [J96.90]. Her hospital course has been associated with acute respiratory failure. The patient has a complicated medical history and has been hospitalized since 2020 12:38 PM.    OVERNIGHT EVENTS:  No acute events overnight  Patient remains sedated and paralyzed  Patient intermittently off and on pressors     BP (!) 86/45   Pulse 119   Temp 99.1 °F (37.3 °C) (Oral)   Resp 20   Ht 5' (1.524 m)   Wt 162 lb 4.1 oz (73.6 kg)   SpO2 99%   BMI 31.69 kg/m²     Assessment        REVIEW OF SYSTEMS    ?x   UNABLE TO OBTAIN: Patient intubated    Constitutional:  ?   Chills   ? Fatigue   ? Fevers   ? Malaise   ? Weight loss   ? Other:     Respiratory:   ?  Cough    ? Shortness of breath    ? Chest pain    ? Other:     Cardiovascular:   ?  Chest pain  ? Dyspnea    ? Exertional chest pressure/discomfort     ? Fatigue      ? Palpitations    ? Syncope   ? Other:     Gastrointestinal:   ?  Abdominal pain   ? Constipation    ? Diarrhea    ? Dysphagia   ? Reflux             ? Vomiting   ? Other:     Genitourinary:  ?  Dysuria     ? Frequency   ? Hematuria   ? Nocturia   ? Urinary incontinence   ? Other:     Musculoskeletal:   ? Back pain    ? Muscle weakness   ? Myalgias    ? Neck pain   ? Stiff joints   ? Other:     Behavioral/Psych:   ? Anxiety    ? Depression     ? Mood swings   ?  Other:     PHYSICAL ASSESSMENT:     General: ?  Oriented x3      ? well appearing      ?x Intubated      ?x ill comfort and emotional support to the patient      Education/support to staff  Education/support to family  Education/support to patient  Discharge planning/helping to coordinate care  Communications with primary service  Caregiver support/education     Principle Problem/Diagnosis:  Acute hypercapnic respiratory failure ([facility] [facility]    Additional Assessments:  Principal Problem:    Acute hypercapnic respiratory failure (HCC)  Active Problems:    Respiratory failure (HCC)    CKD (chronic kidney disease) stage 3, GFR 30-59 ml/min (HCC)    Pulmonary emboli (HCC)    Diastolic CHF ([facility]    Hypertension    Pulmonary hypertension ([facility]    Encounter for palliative care    Influenza A    1- Symptom management/ pain control     Pain Assessment:  Pain is controlled with current analgesics. Medication(s) being used: acetaminophen, narcotic analgesics including fentanyl IV               Anxiety:  none                          Dyspnea:  none                          Fatigue:  none    Other: Intubated    We feel the patient symptoms are being controlled. her current regimen is reviewed by myself and discussed with the staff. 2- Goals of care evaluation   The patient goals of care are spiritual needs, strengthening relationships, preserve independence/autonomy/control and support for family/caregiver   Goals of care discussed with:    ? Patient independently    ? Patient and Family    ? Family or Healthcare DPOA independently    ?x Unable to discuss with patient, family/DPOA not present    3- Code Status  DNR-CCA    4- Other recommendations  - We will continue to provide comfort and support to the patient and the family    Please call with any palliative questions or concerns. Palliative Care Team is available via perfect serve or via phone. Palliative Care will continue to follow Ms. Lambert's care as needed.     The note has been dictated by dragon, typing errors may be a possibility     Thank you for allowing Palliative Care

## 2020-03-04 NOTE — PLAN OF CARE
Problem: Fluid Volume - Imbalance:  Goal: Absence of imbalanced fluid volume signs and symptoms  Description  Absence of imbalanced fluid volume signs and symptoms  Outcome: Ongoing     Problem: Skin Integrity/Risk  Goal: No skin breakdown during hospitalization  Outcome: Ongoing  Goal: Wound healing  Outcome: Ongoing     Problem: Nutrition  Goal: Optimal nutrition therapy  Outcome: Ongoing     Problem: SKIN INTEGRITY  Goal: Skin integrity is maintained or improved  3/4/2020 0505 by Sanford Sylvester RN  Outcome: Ongoing  3/3/2020 2009 by Halle Archuleta RCP  Outcome: Ongoing     Problem: Falls - Risk of:  Goal: Will remain free from falls  Description  Will remain free from falls  Outcome: Ongoing  Goal: Absence of physical injury  Description  Absence of physical injury  Outcome: Ongoing     Problem: Risk for Impaired Skin Integrity  Goal: Tissue integrity - skin and mucous membranes  Description  Structural intactness and normal physiological function of skin and  mucous membranes.   Outcome: Ongoing

## 2020-03-04 NOTE — PLAN OF CARE
Problem: Fluid Volume - Imbalance:  Goal: Absence of imbalanced fluid volume signs and symptoms  Description  Absence of imbalanced fluid volume signs and symptoms  3/4/2020 1516 by Augustin Dumont RN  Outcome: Ongoing  3/4/2020 0505 by Blu Kramer RN  Outcome: Ongoing     Problem: Skin Integrity/Risk  Goal: No skin breakdown during hospitalization  3/4/2020 1516 by Augustin Dumont RN  Outcome: Ongoing  3/4/2020 0505 by Blu Kramer RN  Outcome: Ongoing  Goal: Wound healing  3/4/2020 1516 by Augustin Dumont RN  Outcome: Ongoing  3/4/2020 0505 by Blu Kramer RN  Outcome: Ongoing     Problem: OXYGENATION/RESPIRATORY FUNCTION  Goal: Patient will maintain patent airway  3/4/2020 1516 by Augustin Dumont RN  Outcome: Ongoing  3/4/2020 0815 by Дмитрий Francois RCP  Outcome: Ongoing  Goal: Patient will achieve/maintain normal respiratory rate/effort  Description  Respiratory rate and effort will be within normal limits for the patient  3/4/2020 1516 by Augustin Dumont RN  Outcome: Ongoing  3/4/2020 0815 by Дмитрий Francois RCP  Outcome: Ongoing     Problem: MECHANICAL VENTILATION  Goal: Patient will maintain patent airway  3/4/2020 1516 by Augustin Dumont RN  Outcome: Ongoing  3/4/2020 0815 by Дмитрий Francois RCP  Outcome: Ongoing  Goal: Oral health is maintained or improved  3/4/2020 1516 by Augustin Dumont RN  Outcome: Ongoing  3/4/2020 0815 by Дмитрий Francois RCP  Outcome: Ongoing  Goal: ET tube will be managed safely  3/4/2020 1516 by Augustin Dumont RN  Outcome: Ongoing  3/4/2020 0815 by Дмитрий Francois RCP  Outcome: Ongoing  Goal: Ability to express needs and understand communication  3/4/2020 [phone] by Augustin Dumont RN  Outcome: Ongoing  3/4/2020 0815 by Дмитрий Francois RCP  Outcome: Ongoing  Goal: Mobility/activity is maintained at optimum level for patient  3/4/2020 1516 by Augustin Dumont RN  Outcome: Ongoing  3/4/2020 0815 by Дмитрий Francois RCP  Outcome: Ongoing     Problem: SKIN INTEGRITY  Goal: Skin integrity is maintained or improved  3/4/2020 1516 by Mark Acevedo RN  Outcome: Ongoing  3/4/2020 0815 by Annetta Schirmer, RCP  Outcome: Ongoing  3/4/2020 0505 by Simeon Pierre RN  Outcome: Ongoing     Problem: Falls - Risk of:  Goal: Will remain free from falls  Description  Will remain free from falls  3/4/2020 1516 by Mark Acevedo RN  Outcome: Ongoing  3/4/2020 0505 by Simeon Pierre RN  Outcome: Ongoing  Goal: Absence of physical injury  Description  Absence of physical injury  3/4/2020 1516 by Mark Acevedo RN  Outcome: Ongoing  3/4/2020 0505 by Simeon Pierre RN  Outcome: Ongoing     Problem: Risk for Impaired Skin Integrity  Goal: Tissue integrity - skin and mucous membranes  Description  Structural intactness and normal physiological function of skin and  mucous membranes.   3/4/2020 1516 by Mark Acevedo RN  Outcome: Ongoing  3/4/2020 0505 by Simeon Pierre RN  Outcome: Ongoing     Problem: Nutrition  Goal: Optimal nutrition therapy  3/4/2020 1516 by Mark Acevedo RN  Outcome: Ongoing  3/4/2020 0505 by Simeon Pierre RN  Outcome: Ongoing

## 2020-03-04 NOTE — PROGRESS NOTES
Ventilator Bronchodilator assessment    Breath sounds: Diminished  Inspiratory Pressure: 31  Plateau Pressure: 22    Patient assessed at level 3          [x]    Bronchodilator Assessment    BRONCHODILATOR ASSESSMENT SCORE  Score 0 (Home) 1 2 3 4   Breath Sounds   []  Chronic Ventilator: Patient at baseline []  Mild Wheezes/ Clear []  Intermittent wheezes with good air entry [x]  Bilateral/unilateral wheezing with diminished air entry []  Insp/Exp wheeze and/or poor aeration   Ventilator Pressures   []  Chronic Ventilator []  Insp. Pressure less than 25 cm H20 []  Insp. Pressure less than 25 cm H20 [x]  Insp. Pressure exceeds 25 cm H20 []  Insp.  Pressure exceeds 30 cm H20   Plateau Pressure []  NA   []  Plateau Pressure less than 4  []  Plateau Pressure less than or equal to 5 []  Plateau Pressure greater than or equal to 6 [x]  Plateau Pressure greater than or equal to 8       GUILLERMINA Muro  9:03 AM

## 2020-03-05 NOTE — PROGRESS NOTES
Critical Care Team - Daily Progress Note      Date and time: 3/5/2020 10:13 AM  Patient's name: Martha Malloy Record Number: [id number]  Patient's account/billing number: [de-identified]  Patient's YOB: 1945  Age: 67 y.o. Date of Admission: 2/28/2020 12:38 PM  Length of stay during current admission: 6      Primary Care Physician: Cosme Warren  ICU Attending Physician: Ramona Palmer     Code Status: DNR-CCA    Reason for ICU admission: Acute respiratory failure    Subjective:   Patient has history of COPD, chronic hypoxemic respiratory failure, pulmonary embolism, diastolic heart failure. He presented to outside hospital with dyspnea. She was recently discharged after treatment for COPD exacerbation and respiratory failure. Patient required intubation and mechanical ventilation due to worsening respiratory failure. OVERNIGHT EVENTS:   Patient seen and examined bedside. Blood pressure 120/74, Remains tachycardic with HR in around 120s. Continued on versad and Nimbex drip. (Trying to wean off today). On Rudolph-Synephrine for pressure support. Vent settings PRVC RR-24//PEEP 5/FiO2 40%   ABG this morning pH 7.4/PCO2 56.8/bicarb 42  Afebrile. WBC trended down. blood culture, urine culture negative so far. BUN worsening 100 today. Creatinine better 2.41  Urine output 4.9L. No BM yet. On Bumex 2 TID. No other acute issues overnight.      AWAKE & FOLLOWING COMMANDS:  [x] No   [] Yes    CURRENT VENTILATION STATUS:     [x] Ventilator  [] BIPAP  [] Nasal Cannula [] Room Air      IF INTUBATED, ET TUBE MARKING AT LOWER LIP:       cms    SECRETIONS Amount:  [] Small [x] Moderate  [] Large  [] None  Color:     [x] White [] Colored  [] Bloody      PARALYZED:  [] No    [x] Yes    DIARRHEA:                [x] No                [] Yes  (C. Difficile status: [] positive                                                                                                                       [] negative [] pending)    VASOPRESSORS:  [x] No    [] Yes    If yes -   [] Levophed       [] Dopamine     [] Vasopressin       [] Dobutamine  [] Phenylephrine         [] Epinephrine    CENTRAL LINES:     [] No   [x] Yes   (Date of Insertion:   )           If yes -     [] Right IJ     [] Left IJ [] Right Femoral [] Left Femoral                   [] Right Subclavian [] Left Subclavian       GRIER'S CATHETER:   [] No   [x] Yes  (Date of Insertion:   )     URINE OUTPUT:            [x] Good   [] Low              [] Anuric    REVIEW OF SYSTEMS:  Limited as patient is intubated and sedated.     OBJECTIVE:     VITAL SIGNS:  BP (!) 111/53   Pulse 122   Temp 97.9 °F (36.6 °C) (Oral)   Resp 25   Ht 5' (1.524 m)   Wt 152 lb 5.4 oz (69.1 kg)   SpO2 98%   BMI 29.75 kg/m²   Tmax over 24 hours:  Temp (24hrs), Av.8 °F (37.1 °C), Min:97.9 °F (36.6 °C), Max:99.5 °F (37.5 °C)      Patient Vitals for the past 8 hrs:   BP Temp Temp src Pulse Resp SpO2 Weight   20 0900 -- -- -- 122 25 98 % --   20 0845 -- -- -- 115 25 98 % --   20 0837 -- -- -- 112 24 97 % --   20 0830 -- -- -- 114 27 97 % --   20 0815 -- -- -- 112 24 97 % --   20 0800 (!) 111/53 97.9 °F (36.6 °C) Oral 111 24 97 % --   20 0745 -- -- -- 116 24 98 % --   20 0730 -- -- -- 114 24 98 % --   20 0715 -- -- -- 114 24 98 % --   20 0700 -- -- -- 114 24 98 % --   20 0630 -- -- -- 117 24 98 % --   20 0615 -- -- -- 116 24 98 % --   20 0600 -- -- -- 116 24 98 % --   20 0545 -- -- -- 116 24 99 % --   20 0530 -- -- -- 117 24 99 % --   20 0515 -- -- -- -- -- 99 % --   20 0500 -- -- -- 117 24 99 % 152 lb 5.4 oz (69.1 kg)   20 0445 -- -- -- 117 24 99 % --   20 0430 -- -- -- 117 24 99 % --   20 0415 -- -- -- 117 24 99 % --   20 0400 -- -- -- 116 24 97 % --   20 mL/hr at 03/04/20 1131    sodium chloride 5 mL/hr (03/03/20 1711)    fentaNYL 100 mcg/hr (03/05/20 0101)    dextrose      midazolam 1 mg/hr (03/05/20 0523)    cisatracurium (NIMBEX) infusion Stopped (03/05/20 1008)     PRN Meds:   labetalol, 10 mg, Q4H PRN  glucose, 15 g, PRN  dextrose, 12.5 g, PRN  glucagon (rDNA), 1 mg, PRN  dextrose, 100 mL/hr, PRN  sodium chloride flush, 10 mL, PRN  acetaminophen, 650 mg, Q6H PRN    Or  acetaminophen, 650 mg, Q6H PRN  polyethylene glycol, 17 g, Daily PRN  promethazine, 12.5 mg, Q6H PRN    Or  ondansetron, 4 mg, Q6H PRN  potassium chloride, 10 mEq, PRN  fentanNYL, 25 mcg, Q1H PRN        SUPPORT DEVICES: [x] Ventilator [] BIPAP  [] Nasal Cannula [] Room Air    VENT SETTINGS (Comprehensive) (if applicable):  Vent Information  $Ventilation: $Subsequent Day  Ventilator Started: Yes  Ventilation Day(s): 6  Skin Assessment: Clean, dry, & intact  Equipment ID: #[id number]  Equipment Changed: HME  Vent Type: Servo i  Vent Mode: PRVC  Vt Ordered: 360 mL  Rate Set: 24 bmp  FiO2 : 40 %  Sensitivity: 1  PEEP/CPAP: 5  I Time/ I Time %: 0.65 s  Cuff Pressure (cm H2O): 25 cm H2O  Humidification Source: HME  Nitric Oxide/Epoprostenol In Use?: No  Additional Respiratory  Assessments  Pulse: 122  Resp: 25  SpO2: 98 %  End Tidal CO2: 36 (%)  pCO2 (TCOM, mmHg): 43 mmHg  Position: Semi-Handley's  Humidification Source: HME  Oral Care Completed?: Yes  Oral Care: Mouthwash, Lip moisturizer applied, Mouth moisturizer, Mouth suctioned  Subglottic Suction Done?: Yes  Cuff Pressure (cm H2O): 25 cm H2O  Skin barrier applied: No    ABGs:     Lab Results   Component Value Date    HNW9PWF 45 03/05/2020    FIO2 40.0 03/05/2020     Lactic Acid:   Lab Results   Component Value Date    LACTA NOT REPORTED 02/28/2020         DATA:  Complete Blood Count:   Recent Labs     03/03/20  0516 03/04/20  0452 03/05/20  0424   WBC 18.1* 14.4* 12.4*   HGB 12.1 11.9 11.7*   MCV 95.6 93.9 93.6   PLT See Reflexed IPF Result See Reflexed IPF Result See Reflexed IPF Result   RBC 3.88* 3.92* 3.75*   HCT 37.1 36.8 35.1*   MCH 31.2 30.4 31.2   MCHC 32.6 32.3 33.3   RDW 15.3* 15.4* 15.1*   MPV NOT REPORTED NOT REPORTED NOT REPORTED        PT/INR:  No results found for: PROTIME, INR  PTT:    Lab Results   Component Value Date    APTT 38.6 03/04/2020       Basal Metabolic Profile:   Recent Labs     03/03/20  0516 03/04/20  0452 03/05/20  0424    137 135   K 4.5 4.9 5.4*   BUN 87* 95* 100*   CREATININE 3.25* 2.83* 2.41*   CL 95* 91* 87*   CO2 26 32* 36*      Magnesium: No results found for: MG  Phosphorus: No results found for: PHOS  S. Calcium:  Recent Labs     03/05/20  0424   CALCIUM 8.6     S. Ionized Calcium:No results for input(s): IONCA in the last 72 hours. Urinalysis:   Lab Results   Component Value Date    NITRU NEGATIVE 03/01/2020    COLORU YELLOW 03/01/2020    PHUR 5.0 03/01/2020    WBCUA 5 TO 10 03/01/2020    RBCUA TOO NUMEROUS TO COUNT 03/01/2020    MUCUS NOT REPORTED 03/01/2020    TRICHOMONAS NOT REPORTED 03/01/2020    YEAST NOT REPORTED 03/01/2020    BACTERIA NOT REPORTED 03/01/2020    SPECGRAV 1.014 03/01/2020    LEUKOCYTESUR NEGATIVE 03/01/2020    UROBILINOGEN Normal 03/01/2020    BILIRUBINUR NEGATIVE 03/01/2020    GLUCOSEU TRACE 03/01/2020    KETUA NEGATIVE 03/01/2020    AMORPHOUS NOT REPORTED 03/01/2020       CARDIAC ENZYMES: No results for input(s): CKMB, CKMBINDEX, TROPONINI in the last 72 hours. Invalid input(s): CKTOTAL;3  BNP: No results for input(s): BNP in the last 72 hours. LFTS  No results for input(s): ALKPHOS, ALT, AST, BILITOT, BILIDIR, LABALBU in the last 72 hours. AMYLASE/LIPASE/AMMONIA  No results for input(s): AMYLASE, LIPASE, AMMONIA in the last 72 hours.     Last 3 Blood Glucose:   Recent Labs     03/03/20  0516 03/04/20  0452 03/05/20  0424   GLUCOSE 169* 152* 110*      HgBA1c:    Lab Results   Component Value Date    LABA1C 7.1 02/29/2020         TSH:  No results found for: TSH  ANEMIA History of PE:off heparin for now.  due to low plt. No active bleeding. · Chronic diastolic heart failure:  Echocardiogram showed EF more than 55%, moderate TR and pulmonary insufficiency. Elevated CVP.     · Essential hypertension: Labile blood pressure. On Rudolph-Synephrine for pressure support currently. Titrated as needed.     · GERD: On IV Protonix 40 mg daily. · -DVT prophylaxis:EPC cuffs  · GI prophylaxis-IV Protonix 40 mg daily      CODE STATUS: DNR CCA currently. Palliative care following.     · Sadia Grant M.D.             · Department of Internal Medicine/ Critical care  · [facility])             · 3/5/2020, 10:13 AM  ·

## 2020-03-05 NOTE — PROGRESS NOTES
chloride infusion, Continuous  labetalol (NORMODYNE;TRANDATE) injection 10 mg, Q4H PRN  methylPREDNISolone sodium (SOLU-MEDROL) injection 40 mg, Daily  fentaNYL 20 mcg/mL Infusion, Continuous  aspirin chewable tablet 81 mg, Daily  atorvastatin (LIPITOR) tablet 40 mg, Nightly  docusate (COLACE) 50 MG/5ML liquid 100 mg, BID  glucose (GLUTOSE) 40 % oral gel 15 g, PRN  dextrose 50 % IV solution, PRN  glucagon (rDNA) injection 1 mg, PRN  dextrose 5 % solution, PRN  insulin lispro (HUMALOG) injection vial 0-12 Units, Q4H  sodium chloride flush 0.9 % injection 10 mL, 2 times per day  sodium chloride flush 0.9 % injection 10 mL, PRN  acetaminophen (TYLENOL) tablet 650 mg, Q6H PRN    Or  acetaminophen (TYLENOL) suppository 650 mg, Q6H PRN  polyethylene glycol (GLYCOLAX) packet 17 g, Daily PRN  promethazine (PHENERGAN) tablet 12.5 mg, Q6H PRN    Or  ondansetron (ZOFRAN) injection 4 mg, Q6H PRN  potassium chloride 10 mEq/100 mL IVPB (Peripheral Line), PRN  midazolam (VERSED) 100 mg in dextrose 5% 100 mL infusion, Continuous  cisatracurium besylate (NIMBEX) 200 mg in sodium chloride 0.9 % 100 mL infusion, Continuous  fentaNYL (SUBLIMAZE) injection 25 mcg, Q1H PRN          LABS      CBC:   Recent Labs     03/03/20 0516 03/04/20 0452 03/05/20  0424   WBC 18.1* 14.4* 12.4*   RBC 3.88* 3.92* 3.75*   HGB 12.1 11.9 11.7*   HCT 37.1 36.8 35.1*   MCV 95.6 93.9 93.6   MCH 31.2 30.4 31.2   MCHC 32.6 32.3 33.3   RDW 15.3* 15.4* 15.1*   PLT See Reflexed IPF Result See Reflexed IPF Result See Reflexed IPF Result   MPV NOT REPORTED NOT REPORTED NOT REPORTED      BMP:   Recent Labs     03/03/20  0516 03/04/20 0452 03/05/20 0424    137 135   K 4.5 4.9 5.4*   CL 95* 91* 87*   CO2 26 32* 36*   BUN 87* 95* 100*   CREATININE 3.25* 2.83* 2.41*   GLUCOSE 169* 152* 110*   CALCIUM 7.7* 7.9* 8.6    FOSTER:   Lab Results   Component Value Date    FOSTER NEGATIVE 03/01/2020       SPEP:   Lab Results   Component Value Date    PROT 5.0 02/28/2020

## 2020-03-05 NOTE — CARE COORDINATION
Transition Planning:  Update per bedside nurse pt remains intubated, is off Nimbex, remains on Rudolph. No family at bedside will need to discuss LTACH. Pt is DNRCC-A, palliative care following    1820 CM phoned pt's son Cheryl Sexton, discussed LTACH and possible needs at d/c, he informs that he has not spoke with palliative care and does not wish to discuss LTACH at this time as he does not feel things are going very well. He may consider discussing on Monday after the weekend to evaluate how his mom is doing.

## 2020-03-05 NOTE — PLAN OF CARE
physical injury  3/5/2020 1852 by Basil Vasquez RN  Outcome: Ongoing     Problem: Risk for Impaired Skin Integrity  Goal: Tissue integrity - skin and mucous membranes  Description  Structural intactness and normal physiological function of skin and  mucous membranes.   3/5/2020 1852 by Basil Vasquez RN  Outcome: Ongoing     Problem: Nutrition  Goal: Optimal nutrition therapy  3/5/2020 1852 by Basil Vasquez RN  Outcome: Ongoing        Problem: MECHANICAL VENTILATION  Goal: Oral health is maintained or improved  3/5/2020 1852 by Basil Vasquez RN  Outcome: Ongoing

## 2020-03-05 NOTE — PLAN OF CARE
Problem: OXYGENATION/RESPIRATORY FUNCTION  Goal: Patient will maintain patent airway  3/5/2020 0851 by Nithya Sauceda RCP  Outcome: Ongoing  3/5/2020 0637 by Jose Luis Jung RN  Outcome: Ongoing  Goal: Patient will achieve/maintain normal respiratory rate/effort  Description  Respiratory rate and effort will be within normal limits for the patient  3/5/2020 0851 by Nithya Sauceda RCP  Outcome: Ongoing  3/5/2020 0637 by Jose Luis Jung RN  Outcome: Ongoing     Problem: MECHANICAL VENTILATION  Goal: Patient will maintain patent airway  3/5/2020 0851 by Nithya Sauceda RCP  Outcome: Ongoing  3/5/2020 0637 by Jose Luis Jung RN  Outcome: Ongoing  Goal: Oral health is maintained or improved  3/5/2020 0851 by Nithya Sauceda RCP  Outcome: Ongoing  3/5/2020 0637 by Jose Luis Jung RN  Outcome: Ongoing  Goal: ET tube will be managed safely  3/5/2020 0851 by Nithya Sauceda RCP  Outcome: Ongoing  3/5/2020 0637 by Jose Luis Jung RN  Outcome: Ongoing  Goal: Ability to express needs and understand communication  3/5/2020 0851 by Nithya Sauceda RCP  Outcome: Ongoing  3/5/2020 0637 by Jose Luis Jung RN  Outcome: Ongoing  Goal: Mobility/activity is maintained at optimum level for patient  3/5/2020 0851 by Nithya Sauceda RCP  Outcome: Ongoing  3/5/2020 0637 by Jose Luis Jung RN  Outcome: Ongoing     Problem: SKIN INTEGRITY  Goal: Skin integrity is maintained or improved  3/5/2020 0851 by Nithya Sauceda RCP  Outcome: Ongoing  3/5/2020 0637 by Jose Luis Jung RN  Outcome: Ongoing

## 2020-03-06 NOTE — PROGRESS NOTES
03/05/20 2102   Vent Information   Vent Type Servo i   Vent Mode AC/PC   Pressure Ordered 22   Rate Set 20 bmp   FiO2  30 %   Sensitivity 3   PEEP/CPAP 10   I Time/ I Time % 0.65 s   Vent Patient Data   Peak Inspiratory Pressure 31 cmH2O   Mean Airway Pressure 14.6 cmH20   Rate Measured 20 br/min   Vt Exhaled 429 mL   Minute Volume 8.3 Liters   I:E Ratio 1:3.57   Plateau Pressure 20 [id number]   Static Compliance 66 mL/cmH2O   Dynamic Compliance 54 mL/cmH2O   Total PEEP 15 cmH20   Auto PEEP 5 cmH20     ABG drawn with results and ventilator changes:     PH - 7.60                PC decreased from 26 --> 22 ~ (350ml - 370ml = 8ml/kg IBW)  CO2 - 43                RR decreased from 24 --> 20  PO2 - 108             FiO2 decreased from 40% --> 35%  HCO3 - 42.2        Peep increased from 5 --> 10 (for significant air trapping)

## 2020-03-06 NOTE — PROGRESS NOTES
Critical Care Team - Daily Progress Note      Date and time: 3/6/2020 10:17 AM  Patient's name: Martha Malloy Record Number: [id number]  Patient's account/billing number: [de-identified]  Patient's YOB: 1945  Age: 67 y.o. Date of Admission: 2/28/2020 12:38 PM  Length of stay during current admission: 7      Primary Care Physician: Kolton Evans  ICU Attending Physician: Dr. Rayna Penaloza Status: DNR-CCA    Reason for ICU admission: No chief complaint on file.         SUBJECTIVE:     OVERNIGHT EVENTS:       Intubated  On fentanyl 25 continuous infusion  Of sedation and paralytic  Spontaneously opened in her eyes with some upward gaze  Not alert,  Not following commands  Patient has a left red eye      AWAKE & FOLLOWING COMMANDS:  [x] No   [] Yes    CURRENT VENTILATION STATUS:     [x] Ventilator  [] BIPAP  [] Nasal Cannula [] Room Air      IF INTUBATED, ET TUBE MARKING AT LOWER LIP:       cms    SECRETIONS Amount:  [x] Small [] Moderate  [] Large  [] None  Color:     [x] White [] Colored  [] Bloody    SEDATION:  RAAS Score:  [] Propofol gtt  [] Versed gtt  [] Ativan gtt   [x] No Sedation    PARALYZED:  [x] No    [] Yes    DIARRHEA:                [x] No                [] Yes  (C. Difficile status: [] positive                                                                                                                       [] negative                                                                                                                     [] pending)    VASOPRESSORS:  [] No    [x] Yes    If yes -   [] Levophed       [] Dopamine     [] Vasopressin       [] Dobutamine  [x] Phenylephrine         [] Epinephrine    CENTRAL LINES:     [] No   [x] Yes   (Date of Insertion:   )           If yes -     [] Right IJ     [] Left IJ [x] Right Femoral [] Left Femoral                   [] Right Subclavian [] Left Subclavian       GRIER'S CATHETER:   [] No   [x] Yes  (Date of Insertion:   )     URINE OUTPUT: EXAM:  Constitutional: Intubated, sedated, spontaneously opening her eyes but not following any commands on fentanyl PCA not on any sedation or paralytic  EENT: Left red eye with exudates  Neck: Supple, symmetrical, trachea midline, no adenopathy, thyroid symmetric, no jvd skin normal  Respiratory: clear to auscultation, no wheezes or rales and unlabored breathing.  No intercostal tenderness  Cardiovascular: regular rate and rhythm, normal S1, S2, no murmur noted and 2+ pulses throughout  Abdomen: soft, nontender, nondistended, no masses or organomegaly  NEUROLOGIC intubated, sedated, on fentanyl PCA but spontaneously opening her eyes but not following any commands   Extremities:  peripheral pulses normal, no pedal edema, no clubbing or cyanosis  SKIN: normal coloration and turgor    Any additional physical findings:      MEDICATIONS:  Scheduled Meds:   predniSONE  40 mg Oral Daily    [START ON 3/7/2020] oseltamivir 6mg/ml  30 mg Oral Daily    erythromycin   Left Eye 4x daily    lansoprazole  30 mg Oral Daily    heparin (porcine)  5,000 Units Subcutaneous 3 times per day    ipratropium-albuterol  1 ampule Inhalation Q4H WA    albuterol  2.5 mg Nebulization BID    aspirin  81 mg Oral Daily    atorvastatin  40 mg Oral Nightly    docusate  100 mg Per NG tube BID    insulin lispro  0-12 Units Subcutaneous Q4H    sodium chloride flush  10 mL Intravenous 2 times per day     Continuous Infusions:   phenylephrine (RICKY-SYNEPHRINE) 50mg/250mL infusion Stopped (03/06/20 1005)    sodium chloride 20 mL/hr at 03/05/20 1955    fentaNYL Stopped (03/06/20 0903)    dextrose      midazolam Stopped (03/05/20 1636)    cisatracurium (NIMBEX) infusion Stopped (03/05/20 1008)     PRN Meds:   labetalol, 10 mg, Q4H PRN  glucose, 15 g, PRN  dextrose, 12.5 g, PRN  glucagon (rDNA), 1 mg, PRN  dextrose, 100 mL/hr, PRN  sodium chloride flush, 10 mL, PRN  acetaminophen, 650 mg, Q6H PRN    Or  acetaminophen, 650 mg, Q6H PRN  polyethylene glycol, 17 g, Daily PRN  promethazine, 12.5 mg, Q6H PRN    Or  ondansetron, 4 mg, Q6H PRN  potassium chloride, 10 mEq, PRN  fentanNYL, 25 mcg, Q1H PRN        SUPPORT DEVICES: [x] Ventilator [] BIPAP  [] Nasal Cannula [] Room Air    VENT SETTINGS (Comprehensive) (if applicable):    Vent Information  $Ventilation: $Subsequent Day  Ventilator Started: Yes  Ventilation Day(s): 6  Skin Assessment: Clean, dry, & intact  Equipment ID: #[id number]  Equipment Changed: HME  Vent Type: Servo i  Vent Mode: PCV Assist  Vt Ordered: 360 mL  Pressure Ordered: (S) 20  Rate Set: 20 bmp  Pressure Support: 12 cmH20  FiO2 : 35 %  Sensitivity: 3  PEEP/CPAP: 5  I Time/ I Time %: 0.7 s  Cuff Pressure (cm H2O): (mov)  Humidification Source: HME  Nitric Oxide/Epoprostenol In Use?: No  Additional Respiratory  Assessments  Pulse: 125  Resp: 24  SpO2: 91 %  End Tidal CO2: 64 (%)  pCO2 (TCOM, mmHg): 43 mmHg  Position: Semi-Handley's  Humidification Source: HME  Oral Care Completed?: Yes  Oral Care: Teeth brushed  Subglottic Suction Done?: Yes  Cuff Pressure (cm H2O): (mov)  Skin barrier applied: No    ABGs:     Lab Results   Component Value Date    CMI5KBY 45 03/06/2020    FIO2 35.0 03/06/2020     Lactic Acid:   Lab Results   Component Value Date    LACTA NOT REPORTED 02/28/2020         DATA:  Complete Blood Count:   Recent Labs     03/04/20  0452 03/05/20  0424 03/06/20  0459   WBC 14.4* 12.4* 11.5*   HGB 11.9 11.7* 11.8*   MCV 93.9 93.6 95.0   PLT See Reflexed IPF Result See Reflexed IPF Result 191   RBC 3.92* 3.75* 3.82*   HCT 36.8 35.1* 36.3   MCH 30.4 31.2 30.9   MCHC 32.3 33.3 32.5   RDW 15.4* 15.1* 15.3*   MPV NOT REPORTED NOT REPORTED 11.8        PT/INR:  No results found for: PROTIME, INR  PTT:    Lab Results   Component Value Date    APTT 38.6 03/04/2020       Basal Metabolic Profile:   Recent Labs     03/04/20  0452 03/05/20  0424 03/05/20  1424 03/05/20  1849 03/06/20  0459    135  --   --  137   K 4.9 5.4* 5.5* 5.3 5. 3   BUN 95* 100*  --   --  104*   CREATININE 2.83* 2.41*  --   --  2.05*   CL 91* 87*  --   --  86*   CO2 32* 36*  --   --  38*      Magnesium: No results found for: MG  Phosphorus: No results found for: PHOS  S. Calcium:  Recent Labs     03/06/20  0459   CALCIUM 9.1     S. Ionized Calcium:No results for input(s): IONCA in the last 72 hours. Urinalysis:   Lab Results   Component Value Date    NITRU NEGATIVE 03/01/2020    COLORU YELLOW 03/01/2020    PHUR 5.0 03/01/2020    WBCUA 5 TO 10 03/01/2020    RBCUA TOO NUMEROUS TO COUNT 03/01/2020    MUCUS NOT REPORTED 03/01/2020    TRICHOMONAS NOT REPORTED 03/01/2020    YEAST NOT REPORTED 03/01/2020    BACTERIA NOT REPORTED 03/01/2020    SPECGRAV 1.014 03/01/2020    LEUKOCYTESUR NEGATIVE 03/01/2020    UROBILINOGEN Normal 03/01/2020    BILIRUBINUR NEGATIVE 03/01/2020    GLUCOSEU TRACE 03/01/2020    KETUA NEGATIVE 03/01/2020    AMORPHOUS NOT REPORTED 03/01/2020       CARDIAC ENZYMES: No results for input(s): CKMB, CKMBINDEX, TROPONINI in the last 72 hours. Invalid input(s): CKTOTAL;3  BNP: No results for input(s): BNP in the last 72 hours. LFTS  No results for input(s): ALKPHOS, ALT, AST, BILITOT, BILIDIR, LABALBU in the last 72 hours. AMYLASE/LIPASE/AMMONIA  No results for input(s): AMYLASE, LIPASE, AMMONIA in the last 72 hours. Last 3 Blood Glucose:   Recent Labs     03/04/20  0452 03/05/20  0424 03/06/20  0459   GLUCOSE 152* 110* 138*      HgBA1c:    Lab Results   Component Value Date    LABA1C 7.1 02/29/2020         TSH:  No results found for: TSH  ANEMIA STUDIES  No results for input(s): LABIRON, TIBC, FERRITIN, [id number], FOLATE, OCCULTBLD in the last 72 hours.             Cultures during this admission:     Blood cultures:                 [] None drawn      [x] Negative             []  Positive (Details:  )  Urine Culture:                   [] None drawn      [x] Negative             []  Positive (Details:  )  Sputum Culture:               [] None

## 2020-03-06 NOTE — PROGRESS NOTES
5.0 02/28/2020    PATH ELECTRONICALLY SIGNED.  Anthony Banks M.D. 03/02/2020     UPEP: No results found for: TPU   HEPBSAG:  Lab Results   Component Value Date    HEPBSAG NONREACTIVE 03/01/2020     HEPCAB:  Lab Results   Component Value Date    HEPCAB NONREACTIVE 03/01/2020     C3:   Lab Results   Component Value Date    C3 70 (L) 03/01/2020     C4:   Lab Results   Component Value Date    C4 29 03/01/2020   URINE SODIUM:    Lab Results   Component Value Date    LINNETTE 32 03/01/2020    URINE CREATININE:    Lab Results   Component Value Date    LABCREA 93.0 03/01/2020   URINALYSIS:  U/A:   Lab Results   Component Value Date    NITRU NEGATIVE 03/01/2020    COLORU YELLOW 03/01/2020    PHUR 5.0 03/01/2020    WBCUA 5 TO 10 03/01/2020    RBCUA TOO NUMEROUS TO COUNT 03/01/2020    MUCUS NOT REPORTED 03/01/2020    TRICHOMONAS NOT REPORTED 03/01/2020    YEAST NOT REPORTED 03/01/2020    BACTERIA NOT REPORTED 03/01/2020    SPECGRAV 1.014 03/01/2020    LEUKOCYTESUR NEGATIVE 03/01/2020    UROBILINOGEN Normal 03/01/2020    BILIRUBINUR NEGATIVE 03/01/2020    GLUCOSEU TRACE 03/01/2020    KETUA NEGATIVE 03/01/2020    AMORPHOUS NOT REPORTED 03/01/2020     ANTIGBM:  Lab Results   Component Value Date    GBMABIGG 4 03/03/2020         RADIOLOGY      HISTORY:   ORDERING SYSTEM PROVIDED HISTORY: ACUTE KIDNEY INJURY   TECHNOLOGIST PROVIDED HISTORY:   ACUTE KIDNEY INJURY       FINDINGS:       Kidneys:       The right kidney measures 9.27 x 5.42 x 4.86 cm.       Left kidney measures 9.44 x 4.68 x 4.83 cm.       Kidneys demonstrate normal cortical echogenicity.  Scattered tiny   echogenicities are present bilaterally in the papillary regions without   shadowing.  These may represent small vascular calcifications or   nonobstructing papillary nephroliths.  No hydronephrosis or cortical thinning   is noted.  Superior pole cyst right kidney of 4.27 x 4.02 by 4.04 cm is noted.           Bladder:       Bladder could not be accurately assessed due

## 2020-03-06 NOTE — PLAN OF CARE
Problem: OXYGENATION/RESPIRATORY FUNCTION  Goal: Patient will maintain patent airway  3/5/2020 1951 by Jose Alejandro Sigala RCP  Outcome: Ongoing     Problem: OXYGENATION/RESPIRATORY FUNCTION  Goal: Patient will achieve/maintain normal respiratory rate/effort  Description  Respiratory rate and effort will be within normal limits for the patient  3/5/2020 1951 by Jose Alejandro Sigala RCP  Outcome: Ongoing     Problem: MECHANICAL VENTILATION  Goal: Patient will maintain patent airway  3/5/2020 1951 by Jose Alejandro Sigala RCP  Outcome: Ongoing     Problem: MECHANICAL VENTILATION  Goal: Oral health is maintained or improved  3/5/2020 1951 by Jose Alejandro Sigala RCP  Outcome: Ongoing     Problem: MECHANICAL VENTILATION  Goal: ET tube will be managed safely  3/5/2020 1951 by Jose Alejandro Sigala RCP  Outcome: Ongoing     Problem: MECHANICAL VENTILATION  Goal: Ability to express needs and understand communication  3/5/2020 1951 by Jose Alejandro Sigala RCP  Outcome: Ongoing     Problem: MECHANICAL VENTILATION  Goal: Mobility/activity is maintained at optimum level for patient  3/5/2020 1951 by Jose Alejandro Sigala RCP  Outcome: Ongoing     Problem: SKIN INTEGRITY  Goal: Skin integrity is maintained or improved  3/5/2020 1951 by Jose Alejandro Sigala RCP  Outcome: Ongoing     BRONCHOSPASM/BRONCHOCONSTRICTION     [x]         IMPROVE AERATION/BREATH SOUNDS  [x]   ADMINISTER BRONCHODILATOR THERAPY AS APPROPRIATE  [x]   ASSESS BREATH SOUNDS  []   IMPLEMENT AEROSOL/MDI PROTOCOL  [x]   PATIENT EDUCATION AS NEEDED

## 2020-03-06 NOTE — PROGRESS NOTES
current Tube Feeding  Continued Inpatient Monitoring    Nutrition Evaluation:   · Evaluation: Goal achieved   · Goals: Meet % of estimated nutrient needs.    · Monitoring: TF Intake, TF Tolerance, Monitor Bowel Function      Electronically signed by Bianca Heath RD, LD on 3/6/20 at 3:06 PM    Contact Number: [phone]

## 2020-03-06 NOTE — PROGRESS NOTES
03/06/20 1638   Patient Transport   Time spent transporting 16-30   Transport ventillation type Transport vent   Transport from ICU  (SICU)   Transport destination CT scan       The transport originated from SICU, room 108. Pt. was transported to CT-scan. Assisting with the transport was RN. Appropriate devices were applied to monitor the patient's condition during transport. Patient transported  via 40% O2 via ventilator. Patient tolerated well.         Marcial Hardy  5:20 PM

## 2020-03-07 NOTE — PLAN OF CARE
Problem: OXYGENATION/RESPIRATORY FUNCTION  Goal: Patient will maintain patent airway  3/6/2020 1924 by Magdalena Valentine RCP  Outcome: Ongoing  3/6/2020 0842 by Jacquelyn Knight RCP  Outcome: Ongoing     Problem: OXYGENATION/RESPIRATORY FUNCTION  Goal: Patient will achieve/maintain normal respiratory rate/effort  Description  Respiratory rate and effort will be within normal limits for the patient  3/6/2020 1924 by Magdalena Valentine RCP  Outcome: Ongoing  3/6/2020 0842 by Jacquelyn Knight RCP  Outcome: Ongoing     Problem: MECHANICAL VENTILATION  Goal: Patient will maintain patent airway  3/6/2020 1924 by Magdalena Valentine RCP  Outcome: Ongoing  3/6/2020 0842 by Jacquelyn Knight RCP  Outcome: Ongoing     Problem: MECHANICAL VENTILATION  Goal: Oral health is maintained or improved  3/6/2020 1924 by Magdalena Valentine RCP  Outcome: Ongoing  3/6/2020 0842 by Jacquelyn Knight RCP  Outcome: Ongoing     Problem: MECHANICAL VENTILATION  Goal: ET tube will be managed safely  3/6/2020 1924 by Magdalena Valentine RCP  Outcome: Ongoing  3/6/2020 0842 by Jacquelyn Knight RCP  Outcome: Ongoing     Problem: MECHANICAL VENTILATION  Goal: Ability to express needs and understand communication  3/6/2020 1924 by Magdalena Valentine RCP  Outcome: Ongoing  3/6/2020 0842 by Jacquelyn Knight RCP  Outcome: Ongoing     Problem: MECHANICAL VENTILATION  Goal: Mobility/activity is maintained at optimum level for patient  3/6/2020 1924 by Magdalena Valentine RCP  Outcome: Ongoing  3/6/2020 0842 by Jacquelyn Knight RCP  Outcome: Ongoing     Problem: SKIN INTEGRITY  Goal: Skin integrity is maintained or improved  3/6/2020 1924 by Magdalena Valentine RCP  Outcome: Ongoing  3/6/2020 0842 by Jacquelyn Knight RCP  Outcome: Ongoing   BRONCHOSPASM/BRONCHOCONSTRICTION     [x]         IMPROVE AERATION/BREATH SOUNDS  [x]   ADMINISTER BRONCHODILATOR THERAPY AS APPROPRIATE  [x]   ASSESS BREATH SOUNDS  [] IMPLEMENT AEROSOL/MDI PROTOCOL  [x]   PATIENT EDUCATION AS NEEDED

## 2020-03-07 NOTE — PLAN OF CARE
Problem: Fluid Volume - Imbalance:  Goal: Absence of imbalanced fluid volume signs and symptoms  Description: Absence of imbalanced fluid volume signs and symptoms  3/7/2020 1522 by Timothy Fritz RN  Outcome: Ongoing  3/7/2020 0251 by Tank Wilson RN  Outcome: Ongoing  3/7/2020 0251 by Tank Wilson RN  Outcome: Ongoing     Problem: Skin Integrity/Risk  Goal: No skin breakdown during hospitalization  3/7/2020 1522 by Timothy Fritz RN  Outcome: Ongoing  3/7/2020 0251 by Tank Wilson RN  Outcome: Ongoing  3/7/2020 0251 by Tank Wilson RN  Outcome: Ongoing  Goal: Wound healing  3/7/2020 1522 by Timothy Fritz RN  Outcome: Ongoing  3/7/2020 0251 by Tank Wilson RN  Outcome: Ongoing  3/7/2020 0251 by Tank Wilson RN  Outcome: Ongoing     Problem: Nutrition  Goal: Optimal nutrition therapy  3/7/2020 1522 by Timothy Fritz RN  Outcome: Ongoing  3/7/2020 0251 by Tank Wilson RN  Outcome: Ongoing  3/7/2020 0251 by Tank Wilson RN  Outcome: Ongoing     Problem: SKIN INTEGRITY  Goal: Skin integrity is maintained or improved  3/7/2020 1522 by Timothy Fritz RN  Outcome: Ongoing  3/7/2020 0251 by Tnak Wilson RN  Outcome: Ongoing  3/7/2020 0251 by Tank Wilson RN  Outcome: Ongoing     Problem: Falls - Risk of:  Goal: Will remain free from falls  Description: Will remain free from falls  3/7/2020 1522 by Timothy Fritz RN  Outcome: Ongoing  3/7/2020 0251 by Tank Wilson RN  Outcome: Ongoing  3/7/2020 0251 by Tank Wilson RN  Outcome: Ongoing  Goal: Absence of physical injury  Description: Absence of physical injury  3/7/2020 1522 by Timothy Fritz RN  Outcome: Ongoing  3/7/2020 0251 by Tank Wilson RN  Outcome: Ongoing  3/7/2020 0251 by Tank Wilson RN  Outcome: Ongoing     Problem: Risk for Impaired Skin Integrity  Goal: Tissue integrity - skin and mucous membranes  Description: Structural intactness and normal physiological function of skin and  mucous membranes.   3/7/2020 1522 by Timothy Fritz RN  Outcome:

## 2020-03-07 NOTE — PLAN OF CARE
Problem: Fluid Volume - Imbalance:  Goal: Absence of imbalanced fluid volume signs and symptoms  Description: Absence of imbalanced fluid volume signs and symptoms  3/7/2020 0251 by Mirta Greenwood RN  Outcome: Ongoing  3/7/2020 0251 by Mirta Greenwood RN  Outcome: Ongoing  3/6/2020 1928 by Alyx Davey RN  Outcome: Ongoing     Problem: Skin Integrity/Risk  Goal: No skin breakdown during hospitalization  3/7/2020 0251 by Mirta Greenwood RN  Outcome: Ongoing  3/7/2020 0251 by Mirta Greenwood RN  Outcome: Ongoing  3/6/2020 1928 by Alyx Davey RN  Outcome: Ongoing  Goal: Wound healing  3/7/2020 0251 by Mirta Greenwood RN  Outcome: Ongoing  3/7/2020 0251 by Mirta Greenwood RN  Outcome: Ongoing  3/6/2020 1928 by Alyx Davey RN  Outcome: Ongoing     Problem: OXYGENATION/RESPIRATORY FUNCTION  Goal: Patient will maintain patent airway  3/7/2020 0251 by Mirta Greenwood RN  Outcome: Ongoing  3/7/2020 0251 by Mirta Greenwood RN  Outcome: Ongoing  3/6/2020 1928 by Alyx Davey RN  Outcome: Ongoing  3/6/2020 1924 by Darlin Ford RCP  Outcome: Ongoing  Goal: Patient will achieve/maintain normal respiratory rate/effort  Description: Respiratory rate and effort will be within normal limits for the patient  3/7/2020 0251 by Mirta Greenwood RN  Outcome: Ongoing  3/7/2020 0251 by Mirta Greenwood RN  Outcome: Ongoing  3/6/2020 1928 by Alyx Davey RN  Outcome: Ongoing  3/6/2020 1924 by Darlin Ford RCP  Outcome: Ongoing     Problem: MECHANICAL VENTILATION  Goal: Patient will maintain patent airway  3/7/2020 0251 by Mirta Greenwood RN  Outcome: Ongoing  3/7/2020 0251 by Mirta Greenwood RN  Outcome: Ongoing  3/6/2020 1924 by Darlin Ford RCP  Outcome: Ongoing  Goal: Oral health is maintained or improved  3/7/2020 0251 by Mirta Greenwood RN  Outcome: Ongoing  3/7/2020 0251 by Mirta Greenwood RN  Outcome: Ongoing  3/6/2020 1928 by Alyx Davey RN  Outcome: Ongoing  3/6/2020 1924 by Darlin Ford RCP  Outcome: Ongoing  Goal: ET tube

## 2020-03-07 NOTE — PROGRESS NOTES
[] pending)    VASOPRESSORS:  [x] No    [] Yes    If yes -   [] Levophed       [] Dopamine     [] Vasopressin       [] Dobutamine  [] Phenylephrine         [] Epinephrine    CENTRAL LINES:     [] No   [x] Yes   (Date of Insertion:   )           If yes -     [] Right IJ     [] Left IJ [] Right Femoral [] Left Femoral                   [] Right Subclavian [] Left Subclavian       GRIER'S CATHETER:   [] No   [x] Yes  (Date of Insertion:   )     URINE OUTPUT:            [x] Good   [] Low              [] Anuric      OBJECTIVE:     VITAL SIGNS:  BP (!) 133/53   Pulse 119   Temp 97.7 °F (36.5 °C) (Oral)   Resp 22   Ht 5' (1.524 m)   Wt 145 lb 15.1 oz (66.2 kg)   SpO2 95%   BMI 28.50 kg/m²   Tmax over 24 hours:  Temp (24hrs), Av.7 °F (37.1 °C), Min:97.5 °F (36.4 °C), Max:100.4 °F (38 °C)      Patient Vitals for the past 8 hrs:   BP Temp Temp src Pulse Resp SpO2 Weight   20 0600 -- -- -- 119 22 95 % --   20 0500 -- -- -- 128 24 94 % 145 lb 15.1 oz (66.2 kg)   20 0400 (!) 133/53 97.7 °F (36.5 °C) Oral 122 22 96 % --   20 0327 -- -- -- -- 22 96 % --   20 0326 -- -- -- 118 22 96 % --   20 0300 -- -- -- 123 23 95 % --   20 2315 138/63 97.5 °F (36.4 °C) Oral 119 22 97 % --         Intake/Output Summary (Last 24 hours) at 3/7/2020 0700  Last data filed at 3/7/2020 0600  Gross per 24 hour   Intake 3068.32 ml   Output 4205 ml   Net -1136.68 ml     Date 20 0000 - 20 2359   Shift 4404-5085 7002-2695 6204-1593 24 Hour Total   INTAKE   I.V.(mL/kg) 877(13.2)   877(13.2)   NG/GT(mL/kg) 263(4)   263(4)   Shift Total(mL/kg) 7721(95.0)   6340(95.8)   OUTPUT   Urine(mL/kg/hr) 1240   1240   Shift Total(mL/kg) 7049(34.7)   1240(18.7)   Weight (kg) 66.2 66.2 66.2 66.2     Wt Readings from Last 3 Encounters:   20 145 lb 15.1 oz (66.2 kg)     Body mass index is 28.5 kg/m².         PHYSICAL EXAM:  Constitutional: Intubated, spontaneously opening her eyes but not following any commands on fentanyl PCA not on any sedation or paralytic  EENT: Left red eye with exudates  Respiratory: clear to auscultation, no wheezes/rales.   Cardiovascular: SVT with HR 120sregular rate and rhythm, normal S1, S2, no murmur noted and 2+ pulses throughout  Abdomen: soft, nontender, nondistended, no masses or organomegaly  NEUROLOGIC intubated, sedated,  spontaneously opening her eyes but not following any commands   Extremities:  peripheral pulses normal, no pedal edema, no clubbing or cyanosis  SKIN: normal coloration and turgor    Any additional physical findings:      MEDICATIONS:  Scheduled Meds:   predniSONE  40 mg Oral Daily    trimethoprim-polymyxin b  1 drop Left Eye 6 times per day    midodrine  5 mg Oral TID WC    metoprolol succinate  25 mg Oral Daily    lansoprazole  30 mg Oral Daily    ipratropium-albuterol  1 ampule Inhalation Q4H WA    albuterol  2.5 mg Nebulization BID    aspirin  81 mg Oral Daily    atorvastatin  40 mg Oral Nightly    docusate  100 mg Per NG tube BID    insulin lispro  0-12 Units Subcutaneous Q4H    sodium chloride flush  10 mL Intravenous 2 times per day     Continuous Infusions:   heparin (porcine) Stopped (03/07/20 0336)    phenylephrine (RICKY-SYNEPHRINE) 50mg/250mL infusion Stopped (03/07/20 0459)    sodium chloride 75 mL/hr at 03/06/20 1930    fentaNYL Stopped (03/06/20 0903)    dextrose       PRN Meds:   heparin (porcine), 80 Units/kg, PRN  heparin (porcine), 40 Units/kg, PRN  labetalol, 10 mg, Q4H PRN  glucose, 15 g, PRN  dextrose, 12.5 g, PRN  glucagon (rDNA), 1 mg, PRN  dextrose, 100 mL/hr, PRN  sodium chloride flush, 10 mL, PRN  acetaminophen, 650 mg, Q6H PRN    Or  acetaminophen, 650 mg, Q6H PRN  polyethylene glycol, 17 g, Daily PRN  promethazine, 12.5 mg, Q6H PRN    Or  ondansetron, 4 mg, Q6H PRN  potassium chloride, 10 mEq, PRN  fentanNYL, 25 mcg, Q1H PRN        SUPPORT DEVICES: [] Ventilator [] BIPAP  [] Nasal Cannula [] Room Air    VENT SETTINGS (Comprehensive) (if applicable):    Vent Information  $Ventilation: $Subsequent Day  Ventilator Started: Yes  Ventilation Day(s): 6  Skin Assessment: Clean, dry, & intact  Equipment ID: #[id number]  Equipment Changed: HME  Vent Type: Servo i  Vent Mode: PCV Assist  Vt Ordered: 360 mL  Pressure Ordered: 22  Rate Set: 22 bmp  Pressure Support: 12 cmH20  FiO2 : 40 %  Sensitivity: 3  PEEP/CPAP: 5  I Time/ I Time %: 0.75 s  Cuff Pressure (cm H2O): 25 cm H2O  Humidification Source: HME  Nitric Oxide/Epoprostenol In Use?: No  Additional Respiratory  Assessments  Pulse: 119  Resp: 22  SpO2: 95 %  End Tidal CO2: 41 (%)  pCO2 (TCOM, mmHg): 36 mmHg  Position: Semi-Handley's  Humidification Source: HME  Oral Care Completed?: Yes  Oral Care: Mouthwash  Subglottic Suction Done?: Yes  Cuff Pressure (cm H2O): 25 cm H2O  Skin barrier applied: No    ABGs:     Lab Results   Component Value Date    EHF1DYJ 43 03/07/2020    FIO2 40.0 03/07/2020     Lactic Acid:   Lab Results   Component Value Date    LACTA NOT REPORTED 02/28/2020         DATA:  Complete Blood Count:   Recent Labs     03/06/20 0459 03/06/20 1722 03/07/20  0502   WBC 11.5* 11.3 13.5*   HGB 11.8* 12.4 12.4   MCV 95.0 95.5 97.1    210 216   RBC 3.82* 4.04 4.08   HCT 36.3 38.6 39.6   MCH 30.9 30.7 30.4   MCHC 32.5 32.1 31.3   RDW 15.3* 15.0* 15.0*   MPV 11.8 11.3 11.7        PT/INR:  No results found for: PROTIME, INR  PTT:    Lab Results   Component Value Date    APTT >120.0 03/06/2020       Basal Metabolic Profile:   Recent Labs     03/06/20 0459 03/06/20 1722 03/07/20  0502    137 142   K 5.3 4.9 4.2   * 98* 85*   CREATININE 2.05* 1.76* 1.34*   CL 86* 85* 90*   CO2 38* 37* 35*      Magnesium: No results found for: MG  Phosphorus: No results found for: PHOS  S. Calcium:  Recent Labs     03/07/20  0502   CALCIUM 8.9     S. Ionized Calcium:No results for input(s): IONCA in the last 72 hours.       Urinalysis: A  Resolved Problems:    * No resolved hospital problems. *           PLAN:   - Acute hypoxic hypercapnic respiratory failure: resolving  Blood pressure 120/74, Remains tachycardic with HR in around 120s. Off rudolph. This morning she had run of SVT with . Given push of diltiazem. Vent settings PRVC RR-22//PEEP 5/FiO2 40%   Afebrile. WBC 13.5, trending up. Blood culture, urine culture negative so far.      - BANDAR on CKD stage 3: BUN/cr-better 85/1.34. U/o-4.2l. On Bumex 2 TID.  Nephrology following.      -Episode of SVT this am.This morning she had run of SVT with . Given push of diltiazem. F/u EKG. Electrolyte replacements as needed. K>4, Mg>2.     -Hypovolemic shock-resolved. No sepsis-labile blood pressure. off pressors.      -Influenza A: Copleted Tamiflu 30 mg twice daily. RSV panel, sputum culture, blood and urine culture negative so far.     -COPD exacerbation: Continue DuoNeb, Proventil and oral pred 40 daily.     -Diabetes mellitus type 2 likely [id number] this admission. Hyperglycemia secondary to steroid use. Off drip. Medium dose insulin sliding scale. Hypoglycemia protocol.     -Leukocytosis likely secondary to steroid use. Afebrile. WBC 13.5 trended up from yest. Likely from steroid use. Blood culture, sputum culture-negative so far.      -NSTEMI/type II MI-resolved. On heparin gtt.     -History of PE: On  heparin for now. monitor platelets and active bleeding.  -Chronic diastolic heart failure:  Echocardiogram showed EF more than 55%, moderate TR and pulmonary insufficiency. Elevated CVP.    -Essential hypertension: Labile blood pressure. off Rudolph-Synephrine for pressure support currently. Titrated as needed.     -GERD: On IV Protonix 40 mg daily.     DVT prophylaxis:Heparin gtt  GI prophylaxis-IV Protonix 40 mg daily        CODE STATUS: DNR CCA currently. Palliative care following.               Gabriel Love M.D.              Department of Internal Medicine/ Critical care  [id number] [facility] [address] [facility])             3/7/2020, 7:00 AM

## 2020-03-08 NOTE — PROGRESS NOTES
[] pending)    VASOPRESSORS:  [x] No    [] Yes    If yes -   [] Levophed       [] Dopamine     [] Vasopressin       [] Dobutamine  [] Phenylephrine         [] Epinephrine    CENTRAL LINES:     [] No   [x] Yes   (Date of Insertion:   )           If yes -     [] Right IJ     [] Left IJ [] Right Femoral [] Left Femoral                   [] Right Subclavian [] Left Subclavian       GRIER'S CATHETER:   [] No   [x] Yes  (Date of Insertion:   )     URINE OUTPUT:            [x] Good   [] Low              [] Anuric  OBJECTIVE:   VITAL SIGNS:  /60   Pulse 103   Temp 98.1 °F (36.7 °C) (Oral)   Resp 23   Ht 5' (1.524 m)   Wt 145 lb 15.1 oz (66.2 kg)   SpO2 98%   BMI 28.50 kg/m²   Tmax over 24 hours:  Temp (24hrs), Av °F (36.7 °C), Min:97.9 °F (36.6 °C), Max:98.1 °F (36.7 °C)    Patient Vitals for the past 8 hrs:   BP Temp Temp src Pulse Resp SpO2   20 0600 124/60 -- -- 103 23 98 %   20 0545 -- -- -- 102 22 99 %   20 0530 -- -- -- 105 23 99 %   20 0515 -- -- -- 106 24 99 %   20 0500 (!) 122/57 -- -- 107 21 98 %   20 0445 -- -- -- 103 26 99 %   20 0430 -- -- -- 106 21 99 %   20 0415 -- -- -- 104 23 99 %   20 0400 123/62 98.1 °F (36.7 °C) Oral 106 20 99 %   20 0345 -- -- -- 106 23 98 %   20 0330 -- -- -- 103 20 99 %   20 0320 -- -- -- 99 25 97 %   20 0315 -- -- -- 97 20 97 %   20 0300 135/65 -- -- 98 23 97 %   20 0145 -- -- -- 102 23 97 %   20 0130 -- -- -- 104 27 97 %   20 0115 -- -- -- 102 22 97 %   20 0100 117/63 -- -- 101 22 96 %   20 0045 -- -- -- 105 22 98 %   20 0030 -- -- -- 102 21 97 %   20 0015 -- -- -- 103 23 97 %   20 0000 85/ 97.9 °F (36.6 °C) Axillary 104 17 98 %         Intake/Output Summary (Last 24 hours) at 3/8/2020 0752  Last data filed at 3/8/2020 0400  Gross per 24 hour   Intake 2194 ml   Output 2305 ml   Net chloride 75 mL/hr at 03/06/20 1930    fentaNYL Stopped (03/06/20 0903)    dextrose       PRN Meds:   heparin (porcine), 80 Units/kg, PRN  heparin (porcine), 40 Units/kg, PRN  labetalol, 10 mg, Q4H PRN  glucose, 15 g, PRN  dextrose, 12.5 g, PRN  glucagon (rDNA), 1 mg, PRN  dextrose, 100 mL/hr, PRN  sodium chloride flush, 10 mL, PRN  acetaminophen, 650 mg, Q6H PRN    Or  acetaminophen, 650 mg, Q6H PRN  polyethylene glycol, 17 g, Daily PRN  promethazine, 12.5 mg, Q6H PRN    Or  ondansetron, 4 mg, Q6H PRN  potassium chloride, 10 mEq, PRN  fentanNYL, 25 mcg, Q1H PRN        SUPPORT DEVICES: [] Ventilator [] BIPAP  [] Nasal Cannula [] Room Air    VENT SETTINGS (Comprehensive) (if applicable):    Vent Information  $Ventilation: $Subsequent Day  Ventilator Started: Yes  Ventilation Day(s): 6  Skin Assessment: Clean, dry, & intact  Equipment ID: #[id number]  Equipment Changed: HME  Vent Type: Servo i  Vent Mode: PCV Assist  Vt Ordered: 360 mL  Pressure Ordered: 22  Rate Set: 22 bmp  Pressure Support: 12 cmH20  FiO2 : 40 %  Sensitivity: 3  PEEP/CPAP: 5  I Time/ I Time %: 0.75 s  Cuff Pressure (cm H2O): 25 cm H2O  Humidification Source: HME  Nitric Oxide/Epoprostenol In Use?: No  Additional Respiratory  Assessments  Pulse: 103  Resp: 23  SpO2: 98 %  End Tidal CO2: 39 (%)  pCO2 (TCOM, mmHg): 43 mmHg  Position: Semi-Handley's  Humidification Source: HME  Oral Care Completed?: Yes  Oral Care: Mouth swabbed, Mouth suctioned  Subglottic Suction Done?: Yes  Cuff Pressure (cm H2O): 25 cm H2O  Skin barrier applied: No    ABGs:     Lab Results   Component Value Date    SVL0RLC 46 03/08/2020    FIO2 40.0 03/08/2020     Lactic Acid:   Lab Results   Component Value Date    LACTA NOT REPORTED 02/28/2020         DATA:  Complete Blood Count:   Recent Labs     03/06/20  1722 03/07/20  0502 03/08/20  0525   WBC 11.3 13.5* 13.0*   HGB 12.4 12.4 11.5*   MCV 95.5 97.1 98.7    216 233   RBC 4.04 4.08 3.77*   HCT 38.6 39.6 37.2   MCH 30.7 30.4 30.5

## 2020-03-08 NOTE — PLAN OF CARE
RN  Outcome: Ongoing  3/7/2020 1931 by Izabela Torres RCP  Outcome: Ongoing  3/7/2020 1522 by Kina Wing RN  Outcome: Ongoing  Goal: Mobility/activity is maintained at optimum level for patient  3/8/2020 0037 by Javi Vargas RN  Outcome: Ongoing  3/7/2020 1931 by Izablea Torres RCP  Outcome: Ongoing  3/7/2020 1522 by Kina Wing RN  Outcome: Ongoing     Problem: SKIN INTEGRITY  Goal: Skin integrity is maintained or improved  3/8/2020 0037 by Javi Vargas RN  Outcome: Ongoing  3/7/2020 1931 by Izabela Torres RCP  Outcome: Ongoing  3/7/2020 1522 by Kina Wing RN  Outcome: Ongoing     Problem: Falls - Risk of:  Goal: Will remain free from falls  Description: Will remain free from falls  3/8/2020 0037 by Javi Vargas RN  Outcome: Ongoing  3/7/2020 1522 by Kina Wing RN  Outcome: Ongoing  Goal: Absence of physical injury  Description: Absence of physical injury  3/8/2020 0037 by Javi Vargas RN  Outcome: Ongoing  3/7/2020 1522 by Kina Wing RN  Outcome: Ongoing     Problem: Risk for Impaired Skin Integrity  Goal: Tissue integrity - skin and mucous membranes  Description: Structural intactness and normal physiological function of skin and  mucous membranes.   3/8/2020 0037 by Javi Vargas RN  Outcome: Ongoing  3/7/2020 1522 by Kina Wing RN  Outcome: Ongoing     Problem: Nutrition  Goal: Optimal nutrition therapy  3/8/2020 0037 by Javi Vargas RN  Outcome: Ongoing  3/7/2020 1522 by Kina Wing RN  Outcome: Ongoing

## 2020-03-08 NOTE — PROGRESS NOTES
250* 196* 159*   CALCIUM 8.9 8.9 9.4    FOSTER:   Lab Results   Component Value Date    FOSTER NEGATIVE 03/01/2020       SPEP:   Lab Results   Component Value Date    PROT 5.0 02/28/2020    PATH ELECTRONICALLY SIGNED.  Geraldo Fernandez M.D. 03/02/2020     UPEP: No results found for: TPU   HEPBSAG:  Lab Results   Component Value Date    HEPBSAG NONREACTIVE 03/01/2020     HEPCAB:  Lab Results   Component Value Date    HEPCAB NONREACTIVE 03/01/2020     C3:   Lab Results   Component Value Date    C3 70 (L) 03/01/2020     C4:   Lab Results   Component Value Date    C4 29 03/01/2020   URINE SODIUM:    Lab Results   Component Value Date    LINNETTE 32 03/01/2020    URINE CREATININE:    Lab Results   Component Value Date    LABCREA 93.0 03/01/2020   URINALYSIS:  U/A:   Lab Results   Component Value Date    NITRU NEGATIVE 03/01/2020    COLORU YELLOW 03/01/2020    PHUR 5.0 03/01/2020    WBCUA 5 TO 10 03/01/2020    RBCUA TOO NUMEROUS TO COUNT 03/01/2020    MUCUS NOT REPORTED 03/01/2020    TRICHOMONAS NOT REPORTED 03/01/2020    YEAST NOT REPORTED 03/01/2020    BACTERIA NOT REPORTED 03/01/2020    SPECGRAV 1.014 03/01/2020    LEUKOCYTESUR NEGATIVE 03/01/2020    UROBILINOGEN Normal 03/01/2020    BILIRUBINUR NEGATIVE 03/01/2020    GLUCOSEU TRACE 03/01/2020    KETUA NEGATIVE 03/01/2020    AMORPHOUS NOT REPORTED 03/01/2020     ANTIGBM:  Lab Results   Component Value Date    GBMABIGG 4 03/03/2020         RADIOLOGY      HISTORY:   ORDERING SYSTEM PROVIDED HISTORY: ACUTE KIDNEY INJURY   TECHNOLOGIST PROVIDED HISTORY:   ACUTE KIDNEY INJURY       FINDINGS:       Kidneys:       The right kidney measures 9.27 x 5.42 x 4.86 cm.       Left kidney measures 9.44 x 4.68 x 4.83 cm.       Kidneys demonstrate normal cortical echogenicity.  Scattered tiny   echogenicities are present bilaterally in the papillary regions without   shadowing.  These may represent small vascular calcifications or   nonobstructing papillary nephroliths.  No hydronephrosis or cortical thinning   is noted.  Superior pole cyst right kidney of 4.27 x 4.02 by 4.04 cm is noted.           Bladder:       Bladder could not be accurately assessed due to presence of a catheter.       Incidental small amount of ascites noted right upper quadrant.  Hepatic cysts   are demonstrated.           Impression   1.  No hydronephrosis.  Multiple small echoes in the papillary areas of the   kidney which may represent vascular calcifications or nonobstructing   papillary nephroliths.       2.  Superior pole cyst right kidney.       3.  Small amount of ascites right upper quadrant with appearance of hepatic   cysts.           ASSESSMENT      1. Acute kidney injury secondary to ATN, hypoxia, and shock with normal baseline: Down to baseline    2. Sinus tachycardia and supraventricular tachycardia: Resolved   3. Hypertension: Controlled  4. Respiratory failure/Hypercapnia. On 40% FiO2   5. COPD exacerbation  6. Influenza A    PLAN      1. Decrease IV fluid to 25, given Tube Feeds are at 35 mL/h . 2. Poor prognosis  3. Continue ProAmatine 5mg TID  4. Nephrology will sign off. Call if needed    Attending Physician Statement  I have discussed the care of Lashonda Deal, including pertinent history and exam findings with the resident/fellow. I have reviewed the key elements of all parts of the encounter with the resident/fellow. I have seen and examined the patient with the resident/fellow. I agree with the assessment and plan and status of the problem list as documented.   Papi Baeza

## 2020-03-08 NOTE — PLAN OF CARE
Problem: OXYGENATION/RESPIRATORY FUNCTION  Goal: Patient will maintain patent airway  3/8/2020 0810 by Mala Valdez RCP  Outcome: Ongoing  3/8/2020 0037 by Nirmala Wing RN  Outcome: Ongoing  3/7/2020 1931 by Maria T Silva RCP  Outcome: Ongoing  Goal: Patient will achieve/maintain normal respiratory rate/effort  Description: Respiratory rate and effort will be within normal limits for the patient  3/8/2020 0810 by Mala Valdez RCP  Outcome: Ongoing  3/8/2020 0037 by Nirmala Wing RN  Outcome: Ongoing  3/7/2020 1931 by Maria T Silva RCP  Outcome: Ongoing     Problem: MECHANICAL VENTILATION  Goal: Patient will maintain patent airway  3/8/2020 0810 by Mala Valdez RCP  Outcome: Ongoing  3/8/2020 0037 by Nirmala Wing RN  Outcome: Ongoing  3/7/2020 1931 by Maria T Silva RCP  Outcome: Ongoing  Goal: Oral health is maintained or improved  3/8/2020 0810 by Mala Valdez RCP  Outcome: Ongoing  3/8/2020 0037 by Nirmala Wing RN  Outcome: Ongoing  3/7/2020 1931 by Maria T Silva RCP  Outcome: Ongoing  Goal: ET tube will be managed safely  3/8/2020 0810 by Mala Valdez RCP  Outcome: Ongoing  3/8/2020 0037 by Nirmala Wing RN  Outcome: Ongoing  3/7/2020 1931 by Maria T Silva RCP  Outcome: Ongoing  Goal: Ability to express needs and understand communication  3/8/2020 0810 by Mala Valdez RCP  Outcome: Ongoing  3/8/2020 0037 by Nirmala Wing RN  Outcome: Ongoing  3/7/2020 1931 by Maria T Silva RCP  Outcome: Ongoing  Goal: Mobility/activity is maintained at optimum level for patient  3/8/2020 0810 by Mala Valdez RCP  Outcome: Ongoing  3/8/2020 0037 by Nirmala Wing RN  Outcome: Ongoing  3/7/2020 1931 by Maria T Silva RCP  Outcome: Ongoing     Problem: SKIN INTEGRITY  Goal: Skin integrity is maintained or improved  3/8/2020 0810 by Mala Valdez RCP  Outcome: Ongoing  3/8/2020 0037 by Nirmala Wing, RN  Outcome: Ongoing  3/7/2020 1931 by Cristiane Khalil RCP  Outcome: Ongoing

## 2020-03-09 NOTE — PLAN OF CARE
Problem: Fluid Volume - Imbalance:  Goal: Absence of imbalanced fluid volume signs and symptoms  Description: Absence of imbalanced fluid volume signs and symptoms  Outcome: Ongoing     Problem: Skin Integrity/Risk  Goal: No skin breakdown during hospitalization  Outcome: Ongoing  Goal: Wound healing  Outcome: Ongoing     Problem: OXYGENATION/RESPIRATORY FUNCTION  Goal: Patient will maintain patent airway  3/9/2020 0250 by Hector Hernandez RN  Outcome: Ongoing  3/8/2020 1921 by Cristiane Khalil RCP  Outcome: Ongoing  Goal: Patient will achieve/maintain normal respiratory rate/effort  Description: Respiratory rate and effort will be within normal limits for the patient  3/9/2020 0250 by Hector Hernandez RN  Outcome: Ongoing  3/8/2020 1921 by Cristiane Khalil RCP  Outcome: Ongoing     Problem: MECHANICAL VENTILATION  Goal: Patient will maintain patent airway  3/9/2020 0250 by Hector Hernandez RN  Outcome: Ongoing  3/8/2020 1921 by Cristiane Khalil RCP  Outcome: Ongoing  Goal: Oral health is maintained or improved  3/9/2020 0250 by Hector Hernandez RN  Outcome: Ongoing  3/8/2020 1921 by Cristiane Khalil RCP  Outcome: Ongoing  Goal: ET tube will be managed safely  3/9/2020 0250 by Hector Hernandez RN  Outcome: Ongoing  3/8/2020 1921 by Cristiane Khalil RCP  Outcome: Ongoing  Goal: Ability to express needs and understand communication  3/9/2020 [phone] by Hector Hernandez RN  Outcome: Ongoing  3/8/2020 1921 by Cristiane Khalil RCP  Outcome: Ongoing  Goal: Mobility/activity is maintained at optimum level for patient  3/9/2020 0250 by Hector Hernandez RN  Outcome: Ongoing  3/8/2020 1921 by Cristiane Khalil RCP  Outcome: Ongoing     Problem: SKIN INTEGRITY  Goal: Skin integrity is maintained or improved  3/9/2020 0250 by Hector Hernandez RN  Outcome: Ongoing  3/8/2020 1921 by Cristiane Khalil RCP  Outcome: Ongoing     Problem: Falls - Risk of:  Goal: Will remain free from falls  Description: Will remain free from falls  Outcome: Ongoing  Goal: Absence of physical injury  Description: Absence of physical injury  Outcome: Ongoing     Problem: Risk for Impaired Skin Integrity  Goal: Tissue integrity - skin and mucous membranes  Description: Structural intactness and normal physiological function of skin and  mucous membranes.   Outcome: Ongoing     Problem: Nutrition  Goal: Optimal nutrition therapy  Outcome: Ongoing     Problem: Pain:  Goal: Pain level will decrease  Description: Pain level will decrease  Outcome: Ongoing  Goal: Control of acute pain  Description: Control of acute pain  Outcome: Ongoing  Goal: Control of chronic pain  Description: Control of chronic pain  Outcome: Ongoing

## 2020-03-09 NOTE — PROGRESS NOTES
ETT measures 20 cm from lip to hub. The tube was secured with an endotracheal tube faustin. The endotracheal tube was moved and the skin assessed. Skin assessment revealed no problems. Endotracheal tube was taped at the  20 cm mj. Oral gastric tube was not retaped to the endotracheal tube.   Endotracheal tube faustin was applied on March 9.   amalia dutton  3:21 PM

## 2020-03-09 NOTE — PLAN OF CARE
Problem: Fluid Volume - Imbalance:  Goal: Absence of imbalanced fluid volume signs and symptoms  Description: Absence of imbalanced fluid volume signs and symptoms  3/9/2020 1232 by Nilton Chung RN  Outcome: Ongoing  3/9/2020 0250 by Pratibha Bowers RN  Outcome: Ongoing     Problem: Skin Integrity/Risk  Goal: No skin breakdown during hospitalization  3/9/2020 1232 by Nilton Chung RN  Outcome: Ongoing  3/9/2020 0250 by Pratibha Bowers RN  Outcome: Ongoing  Goal: Wound healing  3/9/2020 1232 by Nilton Chung RN  Outcome: Ongoing  3/9/2020 0250 by Pratibha Bowers RN  Outcome: Ongoing     Problem: OXYGENATION/RESPIRATORY FUNCTION  Goal: Patient will maintain patent airway  3/9/2020 1232 by Nilton Chung RN  Outcome: Ongoing  3/9/2020 0250 by Pratibha Bowers RN  Outcome: Ongoing  Goal: Patient will achieve/maintain normal respiratory rate/effort  Description: Respiratory rate and effort will be within normal limits for the patient  3/9/2020 1232 by Nilton Chung RN  Outcome: Ongoing  3/9/2020 0250 by Pratibha Bowers RN  Outcome: Ongoing     Problem: MECHANICAL VENTILATION  Goal: Patient will maintain patent airway  3/9/2020 1232 by Nilton Chung RN  Outcome: Ongoing  3/9/2020 0250 by Pratibha Bowers RN  Outcome: Ongoing  Goal: Oral health is maintained or improved  3/9/2020 1232 by Nilton Chung RN  Outcome: Ongoing  3/9/2020 0250 by Pratibha Bowers RN  Outcome: Ongoing  Goal: ET tube will be managed safely  3/9/2020 1232 by Nilton Chung RN  Outcome: Ongoing  3/9/2020 0250 by Pratibha Bowers RN  Outcome: Ongoing  Goal: Ability to express needs and understand communication  3/9/2020 1232 by Nilton Chung RN  Outcome: Ongoing  3/9/2020 0250 by Pratibha Bowers RN  Outcome: Ongoing  Goal: Mobility/activity is maintained at optimum level for patient  3/9/2020 1232 by Nilton Chung RN  Outcome: Ongoing  3/9/2020 0250 by Pratibha Bowers RN  Outcome: Ongoing     Problem:

## 2020-03-09 NOTE — PROGRESS NOTES
Critical Care Team - Daily Progress Note      Date and time: 3/9/2020 7:05 AM  Patient's name: Martha aMlloy Record Number: [id number]  Patient's account/billing number: [de-identified]  Patient's YOB: 1945  Age: 67 y.o. Date of Admission: 2/28/2020 12:38 PM  Length of stay during current admission: 10      Primary Care Physician: Dahlia Barron  ICU Attending Physician: Dr. Chrissy Mckeon    Code Status: DNR-CCA    Reason for ICU admission: Acute respiratory failure      SUBJECTIVE:   Patient has history of COPD, chronic hypoxemic respiratory failure, pulmonary embolism, diastolic heart failure.  He presented to outside hospital with dyspnea.  She was recently discharged after treatment for COPD exacerbation and respiratory failure.  Patient required intubation and mechanical ventilation due to worsening respiratory failure. OVERNIGHT EVENTS:   Patient seen and examined bedside. Awake, moving all extremities but not following commands. Blood pressure 149/63, . Vent settings PCV- RR-22/PC-22/PEEP 5/FiO2 40%   Afebrile. WBC 15.2, trending up. Blood culture, urine culture negative so far. BUN/cr-better 62/0.91. U/o-1.9l Tolerating tube feeds at goal of 35. Hudson Rapp went into PSVT's responding to diltiazem. Was also hypotensive systolic blood pressure 71S on one occasion but responded to fluid bolus. No other acute issues overnight.      AWAKE but not  FOLLOWING COMMANDS    CURRENT VENTILATION STATUS:     [x] Ventilator  [] BIPAP  [] Nasal Cannula [] Room Air      SECRETIONS Amount:  [] Small [x] Moderate  [] Large  [] None  Color:     [x] White [] Colored  [] Bloody    SEDATION:  RAAS Score:  [] Propofol gtt  [] Versed gtt  [] Ativan gtt   [x] No Sedation    PARALYZED:  [x] No    [] Yes    DIARRHEA:                [x] No                [] Yes  (C. Difficile status: [] positive [] negative                                                                                                                     [] pending)    VASOPRESSORS:  [x] No    [] Yes    If yes -   [] Levophed       [] Dopamine     [] Vasopressin       [] Dobutamine  [] Phenylephrine         [] Epinephrine    CENTRAL LINES:     [] No   [x] Yes   (Date of Insertion:   )           If yes -     [] Right IJ     [] Left IJ [] Right Femoral [] Left Femoral                   [] Right Subclavian [] Left Subclavian       GRIER'S CATHETER:   [] No   [x] Yes  (Date of Insertion:   )     URINE OUTPUT:            [x] Good   [] Low              [] Anuric      OBJECTIVE:     VITAL SIGNS:  BP (!) 149/63   Pulse 99   Temp 97.9 °F (36.6 °C) (Axillary)   Resp 25   Ht 5' (1.524 m)   Wt 145 lb 15.1 oz (66.2 kg)   SpO2 97%   BMI 28.50 kg/m²   Tmax over 24 hours:  Temp (24hrs), Av.7 °F (37.1 °C), Min:97.9 °F (36.6 °C), Max:99.7 °F (37.6 °C)      Patient Vitals for the past 8 hrs:   BP Temp Temp src Pulse Resp SpO2   20 0600 (!) 149/63 -- -- 99 25 97 %   20 0545 (!) 167/77 -- -- 105 27 96 %   20 0530 (!) 141/60 -- -- 92 23 98 %   20 0515 132/64 -- -- 89 21 97 %   20 0500 (!) 161/95 -- -- 99 25 96 %   20 0445 135/61 -- -- 87 22 97 %   20 0430 (!) 147/68 -- -- 87 22 98 %   20 0415 (!) 153/67 -- -- 93 23 96 %   20 0400 (!) 118/56 97.9 °F (36.6 °C) Axillary 89 19 97 %   20 0345 (!) 150/85 -- -- 94 24 99 %   20 0340 -- -- -- 87 25 100 %   20 0339 -- -- -- -- 21 98 %   20 0330 (!) 124/46 -- -- 87 17 98 %   20 0315 121/69 -- -- 86 16 98 %   20 0300 (!) 145/60 -- -- 89 25 98 %   20 0245 (!) 132/45 -- -- 84 15 99 %   20 0230 (!) 137/52 -- -- 91 21 99 %   20 0215 (!) 140/48 -- -- 95 22 99 %   20 0200 (!) 123/53 -- -- 89 17 99 %   20 0145 (!) 67/36 -- -- 95 18 99 %   20 0130 (!) 64/37 -- -- 96 17 97 % 03/09/20 0115 (!) 92/41 -- -- 100 21 97 %   03/09/20 0100 132/60 -- -- 101 25 98 %   03/09/20 0045 (!) 117/57 -- -- 100 23 98 %   03/09/20 0030 (!) 132/59 -- -- 103 25 98 %   03/09/20 0015 (!) 118/56 -- -- 99 23 99 %   03/09/20 0003 -- -- -- -- 21 99 %   03/09/20 0001 -- -- -- 97 23 99 %   03/09/20 0000 (!) 98/49 98.2 °F (36.8 °C) Axillary 99 23 99 %   03/08/20 2345 (!) 121/49 -- -- 87 18 99 %   03/08/20 2330 (!) 112/53 -- -- 97 23 98 %   03/08/20 2315 (!) 101/44 -- -- 96 22 98 %         Intake/Output Summary (Last 24 hours) at 3/9/2020 0705  Last data filed at 3/9/2020 0400  Gross per 24 hour   Intake 2934 ml   Output 1940 ml   Net 994 ml     Date 03/09/20 0000 - 03/09/20 2359   Shift 0690-1710 1215-1066 9473-1742 24 Hour Total   INTAKE   I.V.(mL/kg) 1060(16)   1060(16)   NG/GT(mL/kg) 213(3.2)   213(3.2)   Shift Total(mL/kg) 6324(92.1)   8247(74.0)   OUTPUT   Urine(mL/kg/hr) 530   530   Shift Total(mL/kg) 530(8)   530(8)   Weight (kg) 66.2 66.2 66.2 66.2     Wt Readings from Last 3 Encounters:   03/07/20 145 lb 15.1 oz (66.2 kg)     Body mass index is 28.5 kg/m². PHYSICAL EXAM:  Constitutional: Intubated, moving all extremities but not following any commands. EENT: Left red eye with exudates  Respiratory: clear to auscultation, no wheezes/rales. Cardiovascular: SVT with HR 120sregular rate and rhythm, normal S1, S2, no murmur noted and 2+ pulses throughout  Abdomen: soft, nontender, nondistended, no masses or organomegaly  NEUROLOGIC intubated,  awake, moving all extremities but not following any commands   Extremities:  Upper extremity edema bilaterally(likely third spacing).   Respiratory culture positive for   SKIN: All over multiple bruises     Any additional physical findings:      MEDICATIONS:  Scheduled Meds:   metoprolol tartrate  25 mg Oral BID    tetrahydrozoline  1 drop Both Eyes TID    predniSONE  40 mg Oral Daily    trimethoprim-polymyxin b  1 drop Left Eye 6 times per day    midodrine Semi-Handley's  Humidification Source: E  Oral Care Completed?: Yes  Oral Care: Mouthwash  Subglottic Suction Done?: Yes  Cuff Pressure (cm H2O): 25 cm H2O  Skin barrier applied: No    ABGs:     Lab Results   Component Value Date    YHA9OZY 46 03/08/2020    FIO2 40.0 03/08/2020     Lactic Acid:   Lab Results   Component Value Date    LACTA NOT REPORTED 02/28/2020         DATA:  Complete Blood Count:   Recent Labs     03/07/20  0502 03/08/20  0525 03/09/20  0508   WBC 13.5* 13.0* 15.2*   HGB 12.4 11.5* 11.2*   MCV 97.1 98.7 101.4    233 247   RBC 4.08 3.77* 3.64*   HCT 39.6 37.2 36.9   MCH 30.4 30.5 30.8   MCHC 31.3 30.9 30.4   RDW 15.0* 14.9* 15.0*   MPV 11.7 12.1 11.7        PT/INR:  No results found for: PROTIME, INR  PTT:    Lab Results   Component Value Date    APTT >120.0 03/06/2020       Basal Metabolic Profile:   Recent Labs     03/07/20  0502 03/08/20  0525 03/09/20  0508    142 145*   K 4.2 4.5 3.7   BUN 85* 77* 62*   CREATININE 1.34* 0.96* 0.91*   CL 90* 91* 96*   CO2 35* 41* 36*      Magnesium:   Lab Results   Component Value Date    MG 1.9 03/08/2020     Phosphorus: No results found for: PHOS  S. Calcium:  Recent Labs     03/09/20  0508   CALCIUM 9.0     S. Ionized Calcium:No results for input(s): IONCA in the last 72 hours.       Urinalysis:   Lab Results   Component Value Date    NITRU NEGATIVE 03/01/2020    COLORU YELLOW 03/01/2020    PHUR 5.0 03/01/2020    WBCUA 5 TO 10 03/01/2020    RBCUA TOO NUMEROUS TO COUNT 03/01/2020    MUCUS NOT REPORTED 03/01/2020    TRICHOMONAS NOT REPORTED 03/01/2020    YEAST NOT REPORTED 03/01/2020    BACTERIA NOT REPORTED 03/01/2020    SPECGRAV 1.014 03/01/2020    LEUKOCYTESUR NEGATIVE 03/01/2020    UROBILINOGEN Normal 03/01/2020    BILIRUBINUR NEGATIVE 03/01/2020    GLUCOSEU TRACE 03/01/2020    KETUA NEGATIVE 03/01/2020    AMORPHOUS NOT REPORTED 03/01/2020       CARDIAC ENZYMES: No results for input(s): CKMB, CKMBINDEX, TROPONINI in the last 72 hours.    Invalid input(s): CKTOTAL;3  BNP: No results for input(s): BNP in the last 72 hours. LFTS  No results for input(s): ALKPHOS, ALT, AST, BILITOT, BILIDIR, LABALBU in the last 72 hours. AMYLASE/LIPASE/AMMONIA  No results for input(s): AMYLASE, LIPASE, AMMONIA in the last 72 hours. Last 3 Blood Glucose:   Recent Labs     03/06/20  1722 03/07/20  0502 03/08/20  0525 03/09/20  0508   GLUCOSE 250* 196* 159* 117*      HgBA1c:    Lab Results   Component Value Date    LABA1C 7.1 02/29/2020         TSH:  No results found for: TSH  ANEMIA STUDIES  No results for input(s): LABIRON, TIBC, FERRITIN, [id number], FOLATE, OCCULTBLD in the last 72 hours. Cultures during this admission:     Blood cultures:                 [] None drawn      [x] Negative             []  Positive (Details:  )  Urine Culture:                   [] None drawn      [x] Negative             []  Positive (Details:  )    Respiratory culture positive for influenza A. Chest Xray (3/9/2020):    ASSESSMENT:      Principal Problem:    Acute hypercapnic respiratory failure (HCC)  Active Problems:    Respiratory failure (HCC)    CKD (chronic kidney disease) stage 3, GFR 30-59 ml/min (HCC)    Pulmonary emboli (HCC)    Diastolic CHF ([facility] [address]    Hypertension    Pulmonary hypertension ([facility] [address]    Encounter for palliative care    Influenza A  Resolved Problems:    * No resolved hospital problems. *           PLAN:   - Acute hypoxic hypercapnic respiratory failure: resolving  Blood pressure 149/63, . Vent settings PCV- RR-22/PC-22/PEEP 5/FiO2 40%      - BANDAR on CKD stage 3:BUN/cr-better 62/0.91. U/o-1.9l Tolerating tube feeds at goal of 35. Off Bumex . Nephrology signed off.      -Intermittent episodes of SVTs- Responds to diltiazem. Electrolyte replacements as needed. K>4, Mg>2. On Lopressor 25 twice daily, aspirin and statin.     -Hypovolemic shock-resolved. No sepsis-labile blood pressure. off pressors.      -Influenza A: Completed

## 2020-03-10 NOTE — PROGRESS NOTES
Physical Therapy    Facility/Department: [address]  Initial Assessment    NAME: Rachid Quinn  : 1945  MRN: [id number]    Date of Service: 3/10/2020    Discharge Recommendations:    CTA  PT Equipment Recommendations  Other: TBD    Assessment   Assessment: PROM, Pt would benefit from continued acute PT. Prognosis: Guarded  Decision Making: Low Complexity  PT Education: Plan of Care;PT Role  REQUIRES PT FOLLOW UP: Yes  Activity Tolerance  Activity Tolerance: Patient Tolerated treatment well       Patient Diagnosis(es): There were no encounter diagnoses. has a past medical history of COPD (chronic obstructive pulmonary disease) ([facility] [address] Diastolic CHF ([facility] [address] Hx of blood clots, and Hypertension. has a past surgical history that includes Cholecystectomy; Endoscopy, colon, diagnostic; and fracture surgery. Restrictions  Restrictions/Precautions  Restrictions/Precautions: Isolation(droplet)  Required Braces or Orthoses?: No  Position Activity Restriction  Other position/activity restrictions: R fem art line  Vision/Hearing        Subjective  General  Chart Reviewed: Yes  Patient assessed for rehabilitation services?: Yes  Response To Previous Treatment: Not applicable  Family / Caregiver Present: No  Follows Commands: Impaired  Subjective  Subjective: RN agreeable to ROM, pt follow no commands, not on sedation per RN.   Pain Screening  Patient Currently in Pain: (no grimace with mobility)  Vital Signs  Patient Currently in Pain: (no grimace with mobility)  Pre Treatment Pain Screening  Intervention List: Patient able to continue with treatment    Orientation  Orientation  Overall Orientation Status: Impaired  Social/Functional History  Social/Functional History  Additional Comments: no family presetn, pt unable to give PLOF  Cognition        Objective          PROM RLE (degrees)  RLE PROM: WFL  PROM LLE (degrees)  LLE PROM: WFL  PROM RUE (degrees)  RUE PROM: WFL  PROM LUE (degrees)  LUE PROM: [employer] Exercises  Comments: BUEs and LLE all planes 12x. calf stretch Bilat 3x 30sec     Plan   Plan  Times per week: 2-3x/wk  Current Treatment Recommendations: Strengthening, ROM  Safety Devices  Type of devices: Nurse notified, Left in bed, All fall risk precautions in place  Restraints  Initially in place: Yes, B soft wrist, reapplied at end of session. AM-PAC Score     AM-PAC Inpatient Mobility without Stair Climbing Raw Score : 5 (03/10/20 1253)  AM-PAC Inpatient without Stair Climbing T-Scale Score : 23.59 (03/10/20 1253)  Mobility Inpatient CMS 0-100% Score: 100 (03/10/20 1253)  Mobility Inpatient without Stair CMS G-Code Modifier : CN (03/10/20 1253)       Goals  Short term goals  Time Frame for Short term goals: 10 visits  Short term goal 1: Pt will not obtain any contractures  Short term goal 2: Pt will tolerate 30min PT  Short term goal 3: Pt will progress with mobility as safe and appropriate.        Therapy Time   Individual Concurrent Group Co-treatment   Time In 1001         Time Out 1019         Minutes 18         Timed Code Treatment Minutes: 9 Minutes       Kurtis Sanders, PT

## 2020-03-10 NOTE — PROGRESS NOTES
Critical Care Team - Daily Progress Note      Date and time: 3/10/2020 6:47 AM  Patient's name: Martha Malloy Record Number: [id number]  Patient's account/billing number: [de-identified]  Patient's YOB: 1945  Age: 67 y.o. Date of Admission: 2/28/2020 12:38 PM  Length of stay during current admission: 11      Primary Care Physician: Lesia Arana  ICU Attending Physician: Dr. Anish Holley    Code Status: DNR-CCA    Reason for ICU admission: Acute respiratory failure s/p intubation      SUBJECTIVE:   Patient has history of COPD, chronic hypoxemic respiratory failure, pulmonary embolism, diastolic heart failure.  He presented to outside hospital with dyspnea.  She was recently discharged after treatment for COPD exacerbation and respiratory failure.  Patient required intubation and mechanical ventilation due to worsening respiratory failure. OVERNIGHT EVENTS:   Patient seen and examined bedside. Awake, moving all extremities but not following commands. Blood pressure 133/63, . Afebrile  Vent settings PCV- RR-22/PC-22/PEEP 8/FiO2 35%   Afebrile. WBC 16.9, trending up. Blood culture, urine culture negative so far. Sodium-149, BUN/cr-better 53/0.73. U/o-1.9l Tolerating tube feeds at goal of 35. .  No other acute issues overnight.      AWAKE but not  FOLLOWING COMMANDS    CURRENT VENTILATION STATUS:     [x] Ventilator  [] BIPAP  [] Nasal Cannula [] Room Air      SECRETIONS Amount:  [] Small [x] Moderate  [] Large  [] None  Color:     [x] White [] Colored  [] Bloody    SEDATION:  RAAS Score:  [] Propofol gtt  [] Versed gtt  [] Ativan gtt   [x] No Sedation    PARALYZED:  [x] No    [] Yes    DIARRHEA:                [x] No                [] Yes  (C. Difficile status: [] positive                                                                                                                       [] negative index is 28.5 kg/m². PHYSICAL EXAM:  Constitutional: Intubated, moving all extremities but not following any commands. EENT: Left red eye with exudates  Respiratory: clear to auscultation, no wheezes/rales. Cardiovascular: SVT with HR 120sregular rate and rhythm, normal S1, S2, no murmur noted and 2+ pulses throughout  Abdomen: soft, nontender, nondistended, no masses or organomegaly  NEUROLOGIC intubated,  awake, moving all extremities but not following any commands   Extremities:  Upper extremity edema bilaterally(likely third spacing).   Respiratory culture positive for   SKIN: All over multiple bruises     Any additional physical findings:      MEDICATIONS:  Scheduled Meds:   magnesium sulfate  1 g Intravenous Once    magnesium sulfate  1 g Intravenous Once    potassium chloride  20 mEq Intravenous Once    metoprolol tartrate  25 mg Oral BID    tetrahydrozoline  1 drop Both Eyes TID    predniSONE  40 mg Oral Daily    trimethoprim-polymyxin b  1 drop Left Eye 6 times per day    midodrine  5 mg Oral TID WC    lansoprazole  30 mg Oral Daily    ipratropium-albuterol  1 ampule Inhalation Q4H WA    albuterol  2.5 mg Nebulization BID    aspirin  81 mg Oral Daily    atorvastatin  40 mg Oral Nightly    docusate  100 mg Per NG tube BID    insulin lispro  0-12 Units Subcutaneous Q4H    sodium chloride flush  10 mL Intravenous 2 times per day     Continuous Infusions:   dilTIAZem (CARDIZEM) 125 mg in dextrose 5% 125 mL infusion Stopped (03/08/20 0330)    heparin (porcine) 17 Units/kg/hr (03/10/20 0600)    phenylephrine (RICKY-SYNEPHRINE) 50mg/250mL infusion Stopped (03/07/20 0459)    sodium chloride 25 mL/hr at 03/08/20 2345    fentaNYL Stopped (03/06/20 0903)    dextrose       PRN Meds:   metoprolol, 5 mg, Q4H PRN  heparin (porcine), 80 Units/kg, PRN  heparin (porcine), 40 Units/kg, PRN  labetalol, 10 mg, Q4H PRN  glucose, 15 g, PRN  dextrose, 12.5 g, PRN  glucagon (rDNA), 1 mg, PRN  dextrose, 100 mL/hr, PRN  sodium chloride flush, 10 mL, PRN  acetaminophen, 650 mg, Q6H PRN    Or  acetaminophen, 650 mg, Q6H PRN  polyethylene glycol, 17 g, Daily PRN  promethazine, 12.5 mg, Q6H PRN    Or  ondansetron, 4 mg, Q6H PRN  potassium chloride, 10 mEq, PRN  fentanNYL, 25 mcg, Q1H PRN        SUPPORT DEVICES: [x] Ventilator [] BIPAP  [] Nasal Cannula [] Room Air    VENT SETTINGS (Comprehensive) (if applicable):    Vent Information  $Ventilation: $Subsequent Day  Ventilator Started: Yes  Ventilation Day(s): 6  Skin Assessment: Clean, dry, & intact  Equipment ID: #[id number]  Equipment Changed: HME  Vent Type: Servo i  Vent Mode: PCV Assist  Vt Ordered: 360 mL  Pressure Ordered: 22  Rate Set: 22 bmp  Pressure Support: 12 cmH20  FiO2 : 35 %  Sensitivity: 3  PEEP/CPAP: 5  I Time/ I Time %: 0.75 s  Cuff Pressure (cm H2O): (mov)  Humidification Source: HME  Nitric Oxide/Epoprostenol In Use?: No  Additional Respiratory  Assessments  Pulse: 110  Resp: (!) 31  SpO2: 95 %  End Tidal CO2: 49 (%)  pCO2 (TCOM, mmHg): 41 mmHg  Position: Semi-Handley's  Humidification Source: HME  Oral Care Completed?: Yes  Oral Care: Mouthwash, Mouth moisturizer, Mouth suctioned  Subglottic Suction Done?: Yes  Cuff Pressure (cm H2O): (mov)  Skin barrier applied: No    ABGs:     Lab Results   Component Value Date    LBH6AWV 47 03/10/2020    FIO2 35.0 03/10/2020     Lactic Acid:   Lab Results   Component Value Date    LACTA NOT REPORTED 02/28/2020         DATA:  Complete Blood Count:   Recent Labs     03/08/20  0525 03/09/20  0508 03/10/20  0421   WBC 13.0* 15.2* 16.9*   HGB 11.5* 11.2* 11.3*   MCV 98.7 101.4 103.2*    247 242   RBC 3.77* 3.64* 3.70*   HCT 37.2 36.9 38.2   MCH 30.5 30.8 30.5   MCHC 30.9 30.4 29.6   RDW 14.9* 15.0* 15.1*   MPV 12.1 11.7 11.5        PT/INR:  No results found for: PROTIME, INR  PTT:    Lab Results   Component Value Date    APTT 112.6 03/10/2020       Basal Metabolic Profile:   Recent Labs     03/08/20  0525 03/09/20  5230 weaning from the ventilator and likely difficult extubation, I have discussed with him about further care and goal of care and also tracheostomy if patient and family wanted to continue current care, they told me that she would not want to be hooked up to ventilator if there is chance that it would be permanent and also she did not want to be in long-term care/nursing home but they want to continue current care for now and to give few more days before any decision. Continue with aggressive bronchodilator therapy continue with prednisone. Continue tube feeding and supportive care. Free water and monitor sodium. Discussed with nursing staff, treatment plan discussed. Total critical care time caring for this patient with life threatening, unstable organ failure, including direct patient contact, management of life support systems, review of data including imaging and labs, discussions with other team members and physicians at least 27  Min so far today, excluding procedures. Please note that this chart was generated using voice recognition Dragon dictation software. Although every effort was made to ensure the accuracy of this automated transcription, some errors in transcription may have occurred.     Michael Shrestha MD  3/10/2020 2:01 PM

## 2020-03-10 NOTE — PLAN OF CARE
pain  3/10/2020 1038 by Nabila Renterai RN  Outcome: Ongoing  3/10/2020 0042 by Rebecca Arteaga RN  Outcome: Ongoing  Goal: Control of chronic pain  Description: Control of chronic pain  3/10/2020 1038 by Nabila Renteria RN  Outcome: Ongoing  3/10/2020 0042 by Rebecca Arteaga RN  Outcome: Ongoing

## 2020-03-10 NOTE — PLAN OF CARE
Problem: Skin Integrity/Risk  Goal: No skin breakdown during hospitalization  3/10/2020 0843 by Cherie Mancini RCP  Outcome: Ongoing  3/10/2020 0042 by Hector Hernandez RN  Outcome: Ongoing     Problem: OXYGENATION/RESPIRATORY FUNCTION  Goal: Patient will maintain patent airway  3/10/2020 0843 by Cherie Mancini RCP  Outcome: Ongoing  3/10/2020 0042 by Hector Hernandez RN  Outcome: Ongoing  3/9/2020 2011 by Checo Forbes RCP  Outcome: Ongoing     Problem: OXYGENATION/RESPIRATORY FUNCTION  Goal: Patient will achieve/maintain normal respiratory rate/effort  Description: Respiratory rate and effort will be within normal limits for the patient  3/10/2020 0843 by Cherie Mancini RCP  Outcome: Ongoing  3/10/2020 0042 by Hector Hernandez RN  Outcome: Ongoing  3/9/2020 2011 by Checo Forbes RCP  Outcome: Ongoing     Problem: MECHANICAL VENTILATION  Goal: Patient will maintain patent airway  3/10/2020 0843 by Cherie Mancini RCP  Outcome: Ongoing  3/10/2020 0042 by Hector Hernandez RN  Outcome: Ongoing  3/9/2020 2011 by Checo Forbes RCP  Outcome: Ongoing     Problem: MECHANICAL VENTILATION  Goal: Oral health is maintained or improved  3/10/2020 0843 by Cherie Mancini RCP  Outcome: Ongoing  3/10/2020 0042 by Hector Hernandez RN  Outcome: Ongoing  3/9/2020 2011 by Checo Forbes RCP  Outcome: Ongoing     Problem: MECHANICAL VENTILATION  Goal: ET tube will be managed safely  3/10/2020 0843 by Cherie Mancini RCP  Outcome: Ongoing  3/10/2020 0042 by Hector Hernandez RN  Outcome: Ongoing  3/9/2020 2011 by Checo Forbes RCP  Outcome: Ongoing     Problem: MECHANICAL VENTILATION  Goal: Ability to express needs and understand communication  3/10/2020 0843 by Cherie Mancini RCP  Outcome: Ongoing  3/10/2020 0042 by Hector Hernandez RN  Outcome: Ongoing  3/9/2020 2011 by Checo Forbes RCP  Outcome: Ongoing     Problem: MECHANICAL VENTILATION  Goal: Mobility/activity is maintained at optimum level for patient  3/10/2020 0843

## 2020-03-10 NOTE — CARE COORDINATION
Transition plan   Spoke with patient's son Yamilet Garcia [facility]) regarding LTACH choice. [place] of choice given, he chooses [facility]. Faxed and called referral to Tyesha at [facility].

## 2020-03-10 NOTE — PLAN OF CARE
Problem: OXYGENATION/RESPIRATORY FUNCTION  Goal: Patient will maintain patent airway  3/10/2020 1934 by Nicole Soto RCP  Outcome: Ongoing     Problem: OXYGENATION/RESPIRATORY FUNCTION  Goal: Patient will achieve/maintain normal respiratory rate/effort  Description: Respiratory rate and effort will be within normal limits for the patient  3/10/2020 1934 by Nicole Soto RCP  Outcome: Ongoing     Problem: MECHANICAL VENTILATION  Goal: Patient will maintain patent airway  3/10/2020 1934 by Nicole Soto RCP  Outcome: Ongoing     Problem: MECHANICAL VENTILATION  Goal: Oral health is maintained or improved  3/10/2020 1934 by Nicole Soto RCP  Outcome: Ongoing     Problem: MECHANICAL VENTILATION  Goal: ET tube will be managed safely  3/10/2020 1934 by Nicole Soto RCP  Outcome: Ongoing     Problem: MECHANICAL VENTILATION  Goal: Ability to express needs and understand communication  3/10/2020 1934 by Nicole Soto RCP  Outcome: Ongoing     Problem: MECHANICAL VENTILATION  Goal: Mobility/activity is maintained at optimum level for patient  3/10/2020 1934 by Nicole Soto RCP  Outcome: Ongoing     Problem: SKIN INTEGRITY  Goal: Skin integrity is maintained or improved  3/10/2020 1934 by Nicole Soto RCP  Outcome: Ongoing     BRONCHOSPASM/BRONCHOCONSTRICTION     [x]         IMPROVE AERATION/BREATH SOUNDS  [x]   ADMINISTER BRONCHODILATOR THERAPY AS APPROPRIATE  [x]   ASSESS BREATH SOUNDS  [x]   IMPLEMENT AEROSOL/MDI PROTOCOL  [x]   PATIENT EDUCATION AS NEEDED

## 2020-03-10 NOTE — PLAN OF CARE
BRONCHOSPASM/BRONCHOCONSTRICTION     [x]         IMPROVE AERATION/BREATH SOUNDS  [x]   ADMINISTER BRONCHODILATOR THERAPY AS APPROPRIATE  [x]   ASSESS BREATH SOUNDS  [x]   IMPLEMENT AEROSOL/MDI PROTOCOL  [x]   PATIENT EDUCATION AS NEEDED    Ventilator Bronchodilator assessment    Breath sounds: Diminished  Inspiratory Pressure: 28  Plateau Pressure: 21    Patient assessed at level 3          []    Bronchodilator Assessment    BRONCHODILATOR ASSESSMENT SCORE  Score 0 (Home) 1 2 3 4   Breath Sounds   []  Chronic Ventilator: Patient at baseline []  Mild Wheezes/ Clear []  Intermittent wheezes with good air entry [x]  Bilateral/unilateral wheezing with diminished air entry []  Insp/Exp wheeze and/or poor aeration   Ventilator Pressures   []  Chronic Ventilator []  Insp. Pressure less than 25 cm H20 []  Insp. Pressure less than 25 cm H20 [x]  Insp. Pressure exceeds 25 cm H20 []  Insp.  Pressure exceeds 30 cm H20   Plateau Pressure []  NA   []  Plateau Pressure less than 4  []  Plateau Pressure less than or equal to 5 [x]  Plateau Pressure greater than or equal to 6 []  Plateau Pressure greater than or equal to [address]  6:16 PM

## 2020-03-11 NOTE — CARE COORDINATION
Care Transition  Patient's family here earlier today. Referral sent to [facility]. Team to discuss trach and peg tomorrow.

## 2020-03-11 NOTE — PROGRESS NOTES
[facility]  Occupational Therapy Not Seen Note    DATE: 3/11/2020  Name: Samantha Palomo  : 1945  MRN: [id number]    Patient not available for Occupational Therapy due to:    [] Testing:    [] Hemodialysis    [] Blood Transfusion in Progress    []Refusal by Patient:    [] Surgery/Procedure:    [] Strict Bedrest    [x] Intubation/Sedation: fentanyl    [] Spine Precautions     [] Pt being transferred to palliative care at this time. Spoke with pt/family and OT services to be defered. [] Pt independent with functional mobility and functional tasks.  Pt with no OT acute care needs at this time, will defer OT eval.    [] Other    Next Scheduled Treatment: Will check back 3/12/2020    Diogenes Palma OTR/MAKI

## 2020-03-11 NOTE — PROGRESS NOTES
Attending Physician Statement  I have discussed the care of Maldonado Xavier, including pertinent history and exam findings with the resident. I have reviewed the key elements of all parts of the encounter with the resident. I have seen and examined the patient with the resident. I agree with the assessment and plan and status of the problem list as documented. I have seen the patient during my round today, I reviewed the chart, ventilator setting reviewed and arterial blood gases seen. She was on pressure control of 22 with rate of 22 PEEP of 8 and FiO2 30% arterial blood gas this morning shows 7.5 0/60/111/47 consistent with chronic respiratory acidosis and metabolic alkalosis. Her respiratory rate was decreased to 20 and her pressure control was reduced to 21 as she was less than her baseline PCO2. Her left is better subconjunctival hemorrhage there is no active oozing and she is tolerating heparin drip. Endotracheal secretion is a small she has bilateral reduced and distant breath sound with minimal air movement. She was off sedation he started moving her upper extremities and slight movement of her lower extremities she turns her head respond to name she is having i abdominal paradoxical breathing while she is awake and she is 19 sedation as she is hypertensive will use propofol and will continue with fentanyl as needed. So far she was not on any spontaneous breathing trial because of her very high PCO2 and did not tolerate spontaneous breathing trial.  Agree to decrease respiratory rate to 18 and continue with pressure control between 20-21 volumes are 300 400 on that. Will start tapering prednisone dose. I had discussed with the family yesterday about goals of care, tracheostomy in next few days or withdrawal of support if family and patient does not want to have a tracheostomy.       Total critical care time caring for this patient with life threatening, unstable organ failure, including direct

## 2020-03-11 NOTE — PROGRESS NOTES
Critical Care Team - Daily Progress Note      Date and time: 3/11/2020 6:28 PM  Patient's name: Martha Malloy Record Number: [id number]  Patient's account/billing number: [de-identified]  Patient's YOB: 1945  Age: 67 y.o. Date of Admission: 2/28/2020 12:38 PM  Length of stay during current admission: 12      Primary Care Physician: Yanira Forte  ICU Attending Physician: Dr. Solitario Licea    Code Status: DNR-CCA    Reason for ICU admission: Acute respiratory failure s/p intubation      SUBJECTIVE:   Patient has history of COPD, chronic hypoxemic respiratory failure, pulmonary embolism, diastolic heart failure.  He presented to outside hospital with dyspnea.  She was recently discharged after treatment for COPD exacerbation and respiratory failure.  Patient required intubation and mechanical ventilation due to worsening respiratory failure. OVERNIGHT EVENTS:   Patient seen and examined bedside. She is alert awake oriented. Following commands. Intermittent movement of upper extremities. Poor strength in lower extremities. Working aggressively with physical therapy. Had runs of SVT last night. Receiving Lopressor as needed. Had episodes of agitation but is on fentanyl. She is not tolerating spontaneous breathing trial well because of her very high PCO2. Plan is to decrease breathing rate to 18 and continue with pressure control. Fluids. Heparin Monique Calico. Feeding. Tube feeds. Analgesia. Fentanyl PRN  Sedation. None. Antibiotics. None. Antibiotics. None. Urine output. Good. Patterson out. DVT prophylaxis. On IV heparin. Sodium 146-improving  Potassium 3.6-improving  BUN/creatinine 48/0.70. WBC count 11.3.  H&H.  10.3/37. 4.      AWAKE but not  FOLLOWING COMMANDS    CURRENT VENTILATION STATUS:     [x] Ventilator  [] BIPAP  [] Nasal Cannula [] Room Air      SECRETIONS Amount:  [] Small [x] Moderate  [] Large  [] None  Color:     [x] White [] Colored  [] Bloody    SEDATION:  SONIA albuterol  2.5 mg Nebulization BID    aspirin  81 mg Oral Daily    atorvastatin  40 mg Oral Nightly    docusate  100 mg Per NG tube BID    sodium chloride flush  10 mL Intravenous 2 times per day     Continuous Infusions:   propofol 10 mcg/kg/min (03/11/20 1522)    norepinephrine Stopped (03/11/20 0143)    heparin (porcine) 18 Units/kg/hr (03/11/20 1620)    sodium chloride 25 mL/hr at 03/11/20 0543    dextrose       PRN Meds:   fentanNYL, 50 mcg, Q1H PRN  metoprolol, 5 mg, Q4H PRN  heparin (porcine), 80 Units/kg, PRN  heparin (porcine), 40 Units/kg, PRN  labetalol, 10 mg, Q4H PRN  glucose, 15 g, PRN  dextrose, 12.5 g, PRN  glucagon (rDNA), 1 mg, PRN  dextrose, 100 mL/hr, PRN  sodium chloride flush, 10 mL, PRN  acetaminophen, 650 mg, Q6H PRN    Or  acetaminophen, 650 mg, Q6H PRN  polyethylene glycol, 17 g, Daily PRN  promethazine, 12.5 mg, Q6H PRN    Or  ondansetron, 4 mg, Q6H PRN  potassium chloride, 10 mEq, PRN        SUPPORT DEVICES: [x] Ventilator [] BIPAP  [] Nasal Cannula [] Room Air    VENT SETTINGS (Comprehensive) (if applicable):    Vent Information  $Ventilation: $Subsequent Day  Ventilator Started: Yes  Ventilation Day(s): 6  Skin Assessment: Clean, dry, & intact  Equipment ID: #[id number]  Equipment Changed: HME  Vent Type: Servo i  Vent Mode: AC/PC  Vt Ordered: 360 mL  Pressure Ordered: (S) 20  Rate Set: 18 bmp  Pressure Support: 12 cmH20  FiO2 : 30 %  Sensitivity: 3  PEEP/CPAP: 8  I Time/ I Time %: 0.75 s  Cuff Pressure (cm H2O): (mov)  Humidification Source: HME  Nitric Oxide/Epoprostenol In Use?: No  Additional Respiratory  Assessments  Pulse: 89  Resp: 18  SpO2: 98 %  End Tidal CO2: 44 (%)  pCO2 (TCOM, mmHg): 41 mmHg  Position: Semi-Handley's  Humidification Source: HME  Oral Care Completed?: Yes  Oral Care: Teeth brushed, Mouthwash, Mouth moisturizer, Mouth suctioned  Subglottic Suction Done?: Yes  Cuff Pressure (cm H2O): (mov)  Skin barrier applied: No    ABGs:     Lab Results   Component Value Date    GXE7YSE >50 03/11/2020    FIO2 100.0 03/11/2020     Lactic Acid:   Lab Results   Component Value Date    LACTA NOT REPORTED 02/28/2020         DATA:  Complete Blood Count:   Recent Labs     03/09/20  0508 03/10/20  0421 03/11/20  0555   WBC 15.2* 16.9* 11.3   HGB 11.2* 11.3* 10.3*   .4 103.2* 110.3*    242 229   RBC 3.64* 3.70* 3.39*   HCT 36.9 38.2 37.4   MCH 30.8 30.5 30.4   MCHC 30.4 29.6 27.5*   RDW 15.0* 15.1* 15.5*   MPV 11.7 11.5 11.5        PT/INR:  No results found for: PROTIME, INR  PTT:    Lab Results   Component Value Date    APTT 52.4 03/11/2020       Basal Metabolic Profile:   Recent Labs     03/09/20  0508 03/10/20  0421 03/11/20  0555   * 149* 146*   K 3.7 3.3* 3.6*   BUN 62* 53* 48*   CREATININE 0.91* 0.73 0.70   CL 96* 98 100   CO2 36* 37* 32*      Magnesium:   Lab Results   Component Value Date    MG 1.9 03/10/2020    MG 1.9 03/08/2020     Phosphorus: No results found for: PHOS  S. Calcium:  Recent Labs     03/11/20  0555   CALCIUM 8.9     S. Ionized Calcium:No results for input(s): IONCA in the last 72 hours. Urinalysis:   Lab Results   Component Value Date    NITRU NEGATIVE 03/01/2020    COLORU YELLOW 03/01/2020    PHUR 5.0 03/01/2020    WBCUA 5 TO 10 03/01/2020    RBCUA TOO NUMEROUS TO COUNT 03/01/2020    MUCUS NOT REPORTED 03/01/2020    TRICHOMONAS NOT REPORTED 03/01/2020    YEAST NOT REPORTED 03/01/2020    BACTERIA NOT REPORTED 03/01/2020    SPECGRAV 1.014 03/01/2020    LEUKOCYTESUR NEGATIVE 03/01/2020    UROBILINOGEN Normal 03/01/2020    BILIRUBINUR NEGATIVE 03/01/2020    GLUCOSEU TRACE 03/01/2020    KETUA NEGATIVE 03/01/2020    AMORPHOUS NOT REPORTED 03/01/2020       CARDIAC ENZYMES: No results for input(s): CKMB, CKMBINDEX, TROPONINI in the last 72 hours. Invalid input(s): CKTOTAL;3  BNP: No results for input(s): BNP in the last 72 hours.     LFTS  No results for input(s): ALKPHOS, ALT, AST, BILITOT, BILIDIR, LABALBU in the last 72 hours.    AMYLASE/LIPASE/AMMONIA  No results for input(s): AMYLASE, LIPASE, AMMONIA in the last 72 hours. Last 3 Blood Glucose:   Recent Labs     03/09/20  0508 03/10/20  0421 03/11/20  0555   GLUCOSE 117* 206* 156*      HgBA1c:    Lab Results   Component Value Date    LABA1C 7.1 02/29/2020         TSH:  No results found for: TSH  ANEMIA STUDIES  No results for input(s): LABIRON, TIBC, FERRITIN, [id number], FOLATE, OCCULTBLD in the last 72 hours. Cultures during this admission:     Blood cultures:                 [] None drawn      [x] Negative             []  Positive (Details:  )  Urine Culture:                   [] None drawn      [x] Negative             []  Positive (Details:  )    Respiratory culture positive for influenza A. Chest Xray (3/11/2020):    ASSESSMENT:      Principal Problem:    Acute hypercapnic respiratory failure (HCC)  Active Problems:    Respiratory failure (HCC)    CKD (chronic kidney disease) stage 3, GFR 30-59 ml/min (HCC)    Pulmonary emboli (HCC)    Diastolic CHF ([facility] [address]    Hypertension    Pulmonary hypertension ([facility] [address]    Encounter for palliative care    Influenza A  Resolved Problems:    * No resolved hospital problems. *           PLAN:      1. Acute hypoxic hypercapnic respiratory failure: resolving slowly. Likely due to critical care illness polyneuropathy and prior history of severe COPD with dyspnea at baseline. Off sedation. Weaning trials regularly as patient tolerates. 2. BANDAR on CKD stage 3: BUN/cr-better 53/0.73. Improved. Nephrology signed off. 3. Hypernatremia. Improving. Sodium at 146 today. Continue free water flushes. 4. Hypovolemic shock. Resolved.      5. Influenza A: Completed Tamiflu 30 mg twice daily.      6. COPD exacerbation: Continue DuoNeb, Proventil and. Start prednisone taper. 7. Critical care illness polyneuropathy. Difficult to wean from ventilator. Aggressive physical therapy every day.     8. Diabetes mellitus is better subconjunctival hemorrhage there is no active oozing and she is tolerating heparin drip. Endotracheal secretion is a small she has bilateral reduced and distant breath sound with minimal air movement. She was off sedation he started moving her upper extremities and slight movement of her lower extremities she turns her head respond to name she is having i abdominal paradoxical breathing while she is awake and she is 19 sedation as she is hypertensive will use propofol and will continue with fentanyl as needed. So far she was not on any spontaneous breathing trial because of her very high PCO2 and did not tolerate spontaneous breathing trial.  Agree to decrease respiratory rate to 18 and continue with pressure control between 20-21 volumes are 300 400 on that. Will start tapering prednisone dose. I had discussed with the family yesterday about goals of care, tracheostomy in next few days or withdrawal of support if family and patient does not want to have a tracheostomy.        Total critical care time caring for this patient with life threatening, unstable organ failure, including direct patient contact, management of life support systems, review of data including imaging and labs, discussions with other team members and physicians at least 27  Min so far today, excluding procedures.        Please note that this chart was generated using voice recognition Dragon dictation software.  Although every effort was made to ensure the accuracy of this automated transcription, some errors in transcription may have occurred.  Scottie Banegas MD  3/11/2020 12:31 PM

## 2020-03-11 NOTE — PLAN OF CARE
Problem: OXYGENATION/RESPIRATORY FUNCTION  Goal: Patient will maintain patent airway  3/11/2020 0817 by Bhumi Campbell RCP  Outcome: Ongoing  3/11/2020 0744 by Letty Amaro RN  Outcome: Ongoing  3/10/2020 1934 by Nakita Munroe RCP  Outcome: Ongoing  Goal: Patient will achieve/maintain normal respiratory rate/effort  Description: Respiratory rate and effort will be within normal limits for the patient  3/11/2020 0817 by Bhumi Campbell RCP  Outcome: Ongoing  3/11/2020 0744 by Letty Amaro RN  Outcome: Ongoing  3/10/2020 1934 by Nakita Munroe RCP  Outcome: Ongoing     Problem: MECHANICAL VENTILATION  Goal: Patient will maintain patent airway  3/11/2020 0817 by Bhumi Campbell RCP  Outcome: Ongoing  3/11/2020 0744 by Letty Amaro RN  Outcome: Ongoing  3/10/2020 1934 by Nakita Munroe RCP  Outcome: Ongoing  Goal: Oral health is maintained or improved  3/11/2020 0817 by Bhumi Campbell RCP  Outcome: Ongoing  3/11/2020 0744 by Letty Amaro RN  Outcome: Ongoing  3/10/2020 1934 by Nakita Munroe RCP  Outcome: Ongoing  Goal: ET tube will be managed safely  3/11/2020 0817 by Bhumi Campbell RCP  Outcome: Ongoing  3/11/2020 0744 by Letty Amaro RN  Outcome: Ongoing  3/10/2020 1934 by Nakita Munroe RCP  Outcome: Ongoing  Goal: Ability to express needs and understand communication  3/11/2020 0817 by Bhumi Campbell RCP  Outcome: Ongoing  3/11/2020 0744 by Letty Amaro RN  Outcome: Ongoing  3/10/2020 1934 by Nakita Munroe RCP  Outcome: Ongoing  Goal: Mobility/activity is maintained at optimum level for patient  3/11/2020 0817 by Bhumi Campbell RCP  Outcome: Ongoing  3/11/2020 0744 by Letty Amaro RN  Outcome: Ongoing  3/10/2020 1934 by Nakita Munroe RCP  Outcome: Ongoing   BRONCHOSPASM/BRONCHOCONSTRICTION     [x]         IMPROVE AERATION/BREATH SOUNDS  [x]   ADMINISTER BRONCHODILATOR THERAPY AS APPROPRIATE  [x]   ASSESS BREATH SOUNDS  []   IMPLEMENT AEROSOL/MDI PROTOCOL  [x]   PATIENT EDUCATION AS NEEDED

## 2020-03-11 NOTE — PROGRESS NOTES
03/11/20 1237   Vent Information   Vent Type Servo i   Vent Mode AC/PC   Pressure Ordered 20   Rate Set 18 bmp   FiO2  30 %   Sensitivity 3   PEEP/CPAP 8   I Time/ I Time % 0.75 s     Vent changes made by dr Harriett Romano at bedside

## 2020-03-12 NOTE — PLAN OF CARE
Problem: OXYGENATION/RESPIRATORY FUNCTION  Goal: Patient will maintain patent airway  3/12/2020 0846 by Ida Jacinto RCP  Outcome: Ongoing  3/12/2020 0340 by Mariana Martínez RN  Outcome: Ongoing  Goal: Patient will achieve/maintain normal respiratory rate/effort  Description: Respiratory rate and effort will be within normal limits for the patient  3/12/2020 0846 by Ida Jacinto RCP  Outcome: Ongoing  3/12/2020 0340 by Mariana Martínez RN  Outcome: Ongoing     Problem: MECHANICAL VENTILATION  Goal: Patient will maintain patent airway  3/12/2020 0846 by Ida Jacinto RCP  Outcome: Ongoing  3/12/2020 0340 by Mariana Martínez RN  Outcome: Ongoing  Goal: Oral health is maintained or improved  3/12/2020 0846 by Ida Jacinto RCP  Outcome: Ongoing  3/12/2020 0340 by Mariana Martínez RN  Outcome: Ongoing  Goal: ET tube will be managed safely  3/12/2020 0846 by Ida Jacinto RCP  Outcome: Ongoing  3/12/2020 0340 by Mariana Martínez RN  Outcome: Ongoing  Goal: Ability to express needs and understand communication  3/12/2020 0846 by Ida Jacinto RCP  Outcome: Ongoing  3/12/2020 0340 by Mariana Martínez RN  Outcome: Ongoing  Goal: Mobility/activity is maintained at optimum level for patient  3/12/2020 0846 by Ida Jacinto RCP  Outcome: Ongoing  3/12/2020 0340 by Mariana Martínez RN  Outcome: Ongoing     Problem: SKIN INTEGRITY  Goal: Skin integrity is maintained or improved  3/12/2020 0846 by Ida Jacinto RCP  Outcome: Ongoing  3/12/2020 0340 by Mariana Martínez RN  Outcome: Ongoing

## 2020-03-12 NOTE — PROGRESS NOTES
Critical Care Team - Daily Progress Note      Date and time: 3/12/2020 8:24 AM  Patient's name: Martha Malloy Record Number: [id number]  Patient's account/billing number: [de-identified]  Patient's YOB: 1945  Age: 67 y.o. Date of Admission: 2/28/2020 12:38 PM  Length of stay during current admission: 13      Primary Care Physician: Graham Burger  ICU Attending Physician: Dr. Josephine Martinez Status: DNR-CCA    Reason for ICU admission: No chief complaint on file. SUBJECTIVE:     Patient has history of COPD, chronic hypoxemic respiratory failure, pulmonary embolism, diastolic heart failure.  He presented to outside hospital with dyspnea.  She was recently discharged after treatment for COPD exacerbation and respiratory failure.  Patient required intubation and mechanical ventilation due to worsening respiratory failure.     OVERNIGHT EVENTS:      No acute events over night per nursing staff. Tolerating PC ventilator support, unable to tolerate spontaneous breathing trials due to hypercarbia. Family discussing goals of care, deciding if pt would want to proceed with trach and PEG.       AWAKE & FOLLOWING COMMANDS:  [x] No   [] Yes    CURRENT VENTILATION STATUS:     [x] Ventilator  [] BIPAP  [] Nasal Cannula [] Room Air      IF INTUBATED, ET TUBE MARKING AT LOWER LIP:       cms    SECRETIONS Amount:  [] Small [x] Moderate  [] Large  [] None  Color:     [x] White [] Colored  [] Bloody    SEDATION:  RAAS Score:  [x] Propofol gtt  [] Versed gtt  [] Ativan gtt   [] No Sedation    PARALYZED:  [x] No    [] Yes    DIARRHEA:                [x] No                [] Yes  (C. Difficile status: [] positive                                                                                                                       [] negative                                                                                                                     [] pending)    VASOPRESSORS:  [x] No    [] Yes    If yes -   [] Levophed       [] Dopamine     [] Vasopressin       [] Dobutamine  [] Phenylephrine         [] Epinephrine    CENTRAL LINES:     [] No   [x] Yes   (Date of Insertion:   )           If yes -     [] Right IJ     [] Left IJ [] Right Femoral [] Left Femoral                   [] Right Subclavian [] Left Subclavian       GRIER'S CATHETER:   [x] No   [] Yes  (Date of Insertion:   )     URINE OUTPUT:            [x] Good   [] Low              [] Anuric      OBJECTIVE:     VITAL SIGNS:  BP (!) 148/65   Pulse 100   Temp 97.9 °F (36.6 °C) (Oral)   Resp 27   Ht 5' (1.524 m)   Wt 149 lb 11.1 oz (67.9 kg)   SpO2 (!) 88%   BMI 29.23 kg/m²   Tmax over 24 hours:  Temp (24hrs), Av.9 °F (36.6 °C), Min:97.7 °F (36.5 °C), Max:98 °F (36.7 °C)      Patient Vitals for the past 8 hrs:   BP Temp Temp src Pulse Resp SpO2 Weight   20 0800 (!) 148/65 -- -- 100 27 (!) 88 % --   20 0730 125/72 -- -- 98 24 91 % --   20 0700 (!) 100/53 -- -- 84 20 97 % --   20 0630 (!) 143/65 -- -- 96 25 94 % --   20 0600 130/67 -- -- 96 27 -- --   20 0530 (!) 98/51 -- -- 88 20 -- --   20 0500 (!) 98/49 -- -- 85 16 -- --   20 0430 (!) 114/54 -- -- 82 18 -- --   20 0407 -- -- -- -- -- -- 149 lb 11.1 oz (67.9 kg)   20 0400 (!) 96/31 97.9 °F (36.6 °C) Oral 88 27 95 % --   20 0341 -- -- -- 96 18 95 % --   20 0330 114/63 -- -- 93 20 95 % --   20 0300 (!) 104/50 -- -- 79 17 94 % --   20 0230 (!) 96/48 -- -- 86 21 95 % --   20 0200 (!) 125/58 -- -- 91 20 94 % --   20 0130 (!) 148/72 -- -- 97 22 91 % --   20 0114 (!) 115/52 -- -- 85 20 -- --   20 0100 (!) 89/35 -- -- 80 16 93 % --   20 0030 (!) 97/50 -- -- 85 20 94 % --         Intake/Output Summary (Last 24 hours) at 3/12/2020 0824  Last data filed at 3/12/2020 0330  Gross per 24 hour   Intake 2101.6 ml   Output 875 ml   Net 1226.6 ml     Date 20 0000 - 20 2359   Shift 7326-3050 4213-3556 1517-5865 24 Hour Total   INTAKE   NG/GT(mL/kg) 300(4.4)   300(4.4)   Shift Total(mL/kg) 300(4.4)   300(4.4)   OUTPUT   Urine(mL/kg/hr) 475(0.9)   475   Shift Total(mL/kg) 475(7)   475(7)   Weight (kg) 67.9 67.9 67.9 67.9     Wt Readings from Last 3 Encounters:   03/12/20 149 lb 11.1 oz (67.9 kg)     Body mass index is 29.23 kg/m². PHYSICAL EXAM:  Constitutional: Intubated, sedated, minimal spontaneous movement, upper extremities more so than lower. EENT: Left eye shows conjunctival injection with dried blood in lower lid   Respiratory: coarse breath sounds b/l   Cardiovascular: Irregularly irregular, HR within normal limits, normal S1, S2, no murmur noted and 2+ pulses throughout  Abdomen: soft, nontender, nondistended, no masses or organomegaly  NEUROLOGIC intubated, sedated on propofol. Opens eyes to gentle stimulation. Not following commands.    Extremities:  Upper extremity edema bilaterally    Any additional physical findings:      MEDICATIONS:  Scheduled Meds:   predniSONE  40 mg Oral Daily    [START ON 3/13/2020] predniSONE  30 mg Oral Daily    [START ON 3/16/2020] predniSONE  20 mg Oral Daily    insulin glargine  20 Units Subcutaneous Nightly    [START ON 3/19/2020] predniSONE  10 mg Oral Daily    insulin lispro  0-18 Units Subcutaneous TID     insulin lispro  0-9 Units Subcutaneous Nightly    metoprolol tartrate  25 mg Oral BID    tetrahydrozoline  1 drop Both Eyes TID    trimethoprim-polymyxin b  1 drop Left Eye 6 times per day    midodrine  5 mg Oral TID     lansoprazole  30 mg Oral Daily    ipratropium-albuterol  1 ampule Inhalation Q4H WA    albuterol  2.5 mg Nebulization BID    aspirin  81 mg Oral Daily    atorvastatin  40 mg Oral Nightly    docusate  100 mg Per NG tube BID    sodium chloride flush  10 mL Intravenous 2 times per day     Continuous Infusions:   propofol 20 mcg/kg/min (03/12/20 0605)    norepinephrine Stopped (03/11/20 0143)    heparin (porcine) 16 HgBA1c:    Lab Results   Component Value Date    LABA1C 7.1 02/29/2020         TSH:  No results found for: TSH  ANEMIA STUDIES  No results for input(s): LABIRON, TIBC, FERRITIN, [id number], FOLATE, OCCULTBLD in the last 72 hours. Cultures during this admission:     Blood cultures:                 [] None drawn      [x] Negative             []  Positive (Details:  )  Urine Culture:                   [] None drawn      [x] Negative             []  Positive (Details:  )  Sputum Culture:               [] None drawn       [] Negative             []  Positive (Details:  )   Endotracheal aspirate:     [] None drawn       [] Negative             []  Positive (Details:  )     Respiratory culture positive for influenza A. Chest Xray (3/12/2020):     No acute findings.  No change. ASSESSMENT:     Principal Problem:    Acute hypercapnic respiratory failure (HCC)  Active Problems:    Respiratory failure (HCC)    CKD (chronic kidney disease) stage 3, GFR 30-59 ml/min (HCC)    Pulmonary emboli (HCC)    Diastolic CHF ([facility] [address]    Hypertension    Pulmonary hypertension ([facility] [address]    Encounter for palliative care    Influenza A  Resolved Problems:    * No resolved hospital problems.  *           PLAN:       NEURO:  - AO x 3  Intubated/sedated on propofol 20 mcg/kg/min  - Neuro checks  - Analgesia     CV:  - BP & HR WNL  Vitals:    03/12/20 0630 03/12/20 0700 03/12/20 0730 03/12/20 0800   BP: (!) 143/65 (!) 100/53 125/72 (!) 148/65   Pulse: 96 84 98 100   Resp: 25 20 24 27   Temp:       TempSrc:       SpO2: 94% 97% 91% (!) 88%   Weight:       Height:       -On high dose heparin infusion.   -Midodrine prn  - Lopressor 25 mg BID started    PULM:  - AC/PC FiO2 30, PEEP 8, RR 21,  mL  - ABG: pH 7.375, pC02 69.4, P02 71.4, HCO3 40.6  - Encourage frequent pulmonary toilet with IS, deep breathing/coughing exercises  - Chest XR today without significant change    GI:  - Diet:   - GI Prophylaxis    RENAL:  - 0.5 cc/kg/hr,

## 2020-03-12 NOTE — PROGRESS NOTES
Attending Physician Statement  I have discussed the care of Reji Sanches, including pertinent history and exam findings with the resident. I have reviewed the key elements of all parts of the encounter with the resident. I have seen and examined the patient with the resident. I agree with the assessment and plan and status of the problem list as documented. I have seen the patient during my round today, I have reviewed the chart, lab seen arterial blood gases seen and medications reviewed. She is requiring propofol since yesterday because of increased work of breathing and airway trapping. She is on Lopressor and midodrine will continue with Lopressor 25 twice daily and will use midodrine as needed. Ventilator setting PC/20/18/8/30% and was not able to do any spontaneous breathing trial  ETT secretoions small to moderate  Chest x-ray no change in hyperinflation and severe flattening of diaphragm  She is able to tolerate tube feeding  On tapered prednisone and on DuoNeb aerosol. Discussed with son in detail and advised to wean her off propofol drip and family to discuss with the patient about tracheostomy that she will likely be vent dependent and tracheostomy could be permanent, son has told me that her mother did not want to go to nursing home or to be ventilator dependent but he thinks that his only way she is going to live. We will give another day or so to patient family to decide especially when patient is more awake    Cussed with nursing staff and discussed with respiratory therapist.    Total critical care time caring for this patient with life threatening, unstable organ failure, including direct patient contact, management of life support systems, review of data including imaging and labs, discussions with other team members and physicians at least 27  Min so far today, excluding procedures. Please note that this chart was generated using voice recognition Dragon dictation software. Although every effort was made to ensure the accuracy of this automated transcription, some errors in transcription may have occurred.         Jorge Luis Mccullough MD  3/12/2020 10:54 AM

## 2020-03-12 NOTE — PLAN OF CARE
Problem: Falls - Risk of:  Goal: Will remain free from falls  Description: Will remain free from falls  Outcome: Met This Shift     Problem: Restraint Use - Nonviolent/Non-Self-Destructive Behavior:  Goal: Absence of restraint-related injury  Description: Absence of restraint-related injury  Outcome: Met This Shift     Problem: Fluid Volume - Imbalance:  Goal: Absence of imbalanced fluid volume signs and symptoms  Description: Absence of imbalanced fluid volume signs and symptoms  Outcome: Ongoing     Problem: OXYGENATION/RESPIRATORY FUNCTION  Goal: Patient will maintain patent airway  3/12/2020 0846 by Denver Kaska, RCP  Outcome: Ongoing  Goal: Patient will achieve/maintain normal respiratory rate/effort  Description: Respiratory rate and effort will be within normal limits for the patient  3/12/2020 0846 by Denver Kaska, RCP  Outcome: Ongoing     Problem: MECHANICAL VENTILATION  Goal: Patient will maintain patent airway  3/12/2020 0846 by Denver Kaska, RCP  Outcome: Ongoing  Goal: Oral health is maintained or improved  3/12/2020 0846 by Denver Kaska, RCP  Outcome: Ongoing  Goal: ET tube will be managed safely  3/12/2020 1836 by Salomón Brothers RN  Outcome: Ongoing  3/12/2020 0846 by Denver Kaska, RCP  Outcome: Ongoing  Goal: Ability to express needs and understand communication  3/12/2020 0846 by Denver Kaska, RCP  Outcome: Ongoing  Goal: Mobility/activity is maintained at optimum level for patient  3/12/2020 0846 by Denver Kaska, RCP  Outcome: Ongoing     Problem: SKIN INTEGRITY  Goal: Skin integrity is maintained or improved  3/12/2020 1836 by Salomón Brothers RN  Outcome: Ongoing  3/12/2020 0846 by Denver Kaska, RCP  Outcome: Ongoing     Problem: Nutrition  Goal: Optimal nutrition therapy  Outcome: Ongoing     Problem: Pain:  Goal: Pain level will decrease  Description: Pain level will decrease  Outcome: Ongoing     Problem: Restraint Use - Nonviolent/Non-Self-Destructive Behavior:  Goal: Absence of restraint

## 2020-03-13 NOTE — PLAN OF CARE
Problem: OXYGENATION/RESPIRATORY FUNCTION  Goal: Patient will maintain patent airway  3/13/2020 0902 by Paralisael Van, RCP  Outcome: Ongoing  3/13/2020 0329 by Kailee Leo RN  Outcome: Ongoing  Goal: Patient will achieve/maintain normal respiratory rate/effort  Description: Respiratory rate and effort will be within normal limits for the patient  3/13/2020 0902 by Paralisael Plan, RCP  Outcome: Ongoing  3/13/2020 0329 by Kailee Leo RN  Outcome: Ongoing     Problem: MECHANICAL VENTILATION  Goal: Patient will maintain patent airway  3/13/2020 0902 by Paralee Plan, RCP  Outcome: Ongoing  3/13/2020 0329 by Kailee Leo RN  Outcome: Ongoing  Goal: Oral health is maintained or improved  3/13/2020 0902 by Paralisael Van, RCP  Outcome: Ongoing  3/13/2020 0329 by Kailee Leo RN  Outcome: Ongoing  Goal: ET tube will be managed safely  3/13/2020 0902 by Paralisael Van, RCP  Outcome: Ongoing  3/13/2020 0329 by Kailee Leo RN  Outcome: Ongoing  Goal: Ability to express needs and understand communication  3/13/2020 0902 by Paralisael Van, RCP  Outcome: Ongoing  3/13/2020 0329 by Kailee Leo RN  Outcome: Ongoing  Goal: Mobility/activity is maintained at optimum level for patient  3/13/2020 0902 by Paralisael Van, RCP  Outcome: Ongoing  3/13/2020 0329 by Kailee Loe RN  Outcome: Ongoing     Problem: SKIN INTEGRITY  Goal: Skin integrity is maintained or improved  3/13/2020 0902 by Shira Van, RCP  Outcome: Ongoing  3/13/2020 0329 by Kailee Leo RN  Outcome: Ongoing   BRONCHOSPASM/BRONCHOCONSTRICTION     [x]         IMPROVE AERATION/BREATH SOUNDS  [x]   ADMINISTER BRONCHODILATOR THERAPY AS APPROPRIATE  [x]   ASSESS BREATH SOUNDS  []   IMPLEMENT AEROSOL/MDI PROTOCOL  [x]   PATIENT EDUCATION AS NEEDED

## 2020-03-13 NOTE — PROGRESS NOTES
Occupational Therapy Not Seen Note    DATE: 3/13/2020  Name: Corby Canales  : 1945  MRN: [id number]    Patient not available for Occupational Therapy due to:    Sedation/ Intubation    Next Scheduled Treatment: Re-check 3/16/2020    Electronically signed by RAMÓN Clark on 3/13/2020 at 2:31 PM

## 2020-03-13 NOTE — PROGRESS NOTES
Nutrition Assessment (Enteral Nutrition)    Type and Reason for Visit: Reassess    Nutrition Recommendations: Recommend continue Vital Af at 45 ml per hour to provide 1300 kcal, 81 g protein    Nutrition Assessment: Tube feed is at goal, vent continues. +BM, 10 ml residual    Malnutrition Assessment:  · Malnutrition Status: At risk for malnutrition  · Context: Acute illness or injury  · Findings of the 6 clinical characteristics of malnutrition (Minimum of 2 out of 6 clinical characteristics is required to make the diagnosis of moderate or severe Protein Calorie Malnutrition based on AND/ASPEN Guidelines):  1. Energy Intake-Greater than 75% of estimated energy requirement, Greater than or equal to 7 days    2. Weight Loss-Unable to assess,    3. Fat Loss-No significant subcutaneous fat loss,    4. Muscle Loss-No significant muscle mass loss,    5. Fluid Accumulation-Mild fluid accumulation, Extremities, Generalized  6.  Strength-Not measured    Nutrition Risk Level:  Moderate    Nutrition Needs:  · Estimated Daily Total Kcal: 7758-4056 kcal/day  · Estimated Daily Protein (g): 70 g pro/day     Nutrition Diagnosis:   · Problem: Inadequate oral intake  · Etiology: related to Impaired respiratory function-inability to consume food     Signs and symptoms:  as evidenced by NPO status due to medical condition, Nutrition support - EN    Objective Information:  · Current Nutrition Therapies:  · Oral Diet Orders: NPO   · Tube Feeding (TF) Orders:   · Formula: Semi-elemental  · Rate (ml/hr):45 ml per hour    · Volume (ml/day): 1080 ml  · Duration: Continuous  · Goal TF & Flush Orders Provides: 45 mL/hr =1296 kcal and 81 g pro/day   · Anthropometric Measures:  · Ht: 5' (152.4 cm)   · Current Body Wt: 149 lb (67.6 kg)  · Admission Body Wt: 144 lb 13.5 oz (65.7 kg)  · Ideal Body Wt: 100 lb (45.4 kg), % Ideal Body 145% (adm/ideal)   · BMI Classification: BMI 30.0 - 34.9 Obese Class I    Nutrition Interventions:

## 2020-03-13 NOTE — PROGRESS NOTES
[id number] 1146-2359 24 Hour Total   INTAKE   I.V.(mL/kg) 241.3(3.6)   241.3(3.6)   NG/GT(mL/kg) 618(9.1)   618(9.1)   Shift Total(mL/kg) 859. 3(12.7)   859. 3(12.7)   OUTPUT   Urine(mL/kg/hr) 400(0.7)   400   Shift Total(mL/kg) 400(5.9)   400(5.9)   Weight (kg) 67.7 67.7 67.7 67.7     Wt Readings from Last 3 Encounters:   03/13/20 149 lb 4 oz (67.7 kg)     Body mass index is 29.15 kg/m². PHYSICAL EXAM:  Constitutional: Intubated, moving all extremities but not following any commands. EENT: Left red eye with exudates-improving  Respiratory: clear to auscultation, no wheezes/rales. Cardiovascular: SVT with HR 120sregular rate and rhythm, normal S1, S2, no murmur noted and 2+ pulses throughout  Abdomen: soft, nontender, nondistended, no masses or organomegaly  NEUROLOGIC intubated,  awake, moving all extremities but not following any commands   Extremities:  Upper extremity edema bilaterally(likely third spacing).   Respiratory culture positive for   SKIN: All over multiple bruises     Any additional physical findings:      MEDICATIONS:  Scheduled Meds:   bacitracin-polymyxin b   Left Eye 4x Daily    predniSONE  30 mg Oral Daily    [START ON 3/16/2020] predniSONE  20 mg Oral Daily    insulin glargine  20 Units Subcutaneous Nightly    [START ON 3/19/2020] predniSONE  10 mg Oral Daily    insulin lispro  0-18 Units Subcutaneous TID     insulin lispro  0-9 Units Subcutaneous Nightly    metoprolol tartrate  25 mg Oral BID    trimethoprim-polymyxin b  1 drop Left Eye 6 times per day    lansoprazole  30 mg Oral Daily    ipratropium-albuterol  1 ampule Inhalation Q4H WA    albuterol  2.5 mg Nebulization BID    aspirin  81 mg Oral Daily    atorvastatin  40 mg Oral Nightly    docusate  100 mg Per NG tube BID    sodium chloride flush  10 mL Intravenous 2 times per day     Continuous Infusions:   propofol Stopped (03/13/20 3234)    heparin (porcine) 16 Units/kg/hr (03/13/20 0557)    sodium chloride 25 mL/hr at 03/13/20 0204    dextrose       PRN Meds:   potassium chloride, 20 mEq, PRN  midodrine, 5 mg, PRN  fentanNYL, 50 mcg, Q1H PRN  metoprolol, 5 mg, Q4H PRN  heparin (porcine), 80 Units/kg, PRN  heparin (porcine), 40 Units/kg, PRN  labetalol, 10 mg, Q4H PRN  glucose, 15 g, PRN  dextrose, 12.5 g, PRN  glucagon (rDNA), 1 mg, PRN  dextrose, 100 mL/hr, PRN  sodium chloride flush, 10 mL, PRN  acetaminophen, 650 mg, Q6H PRN    Or  acetaminophen, 650 mg, Q6H PRN  polyethylene glycol, 17 g, Daily PRN  promethazine, 12.5 mg, Q6H PRN    Or  ondansetron, 4 mg, Q6H PRN        SUPPORT DEVICES: [x] Ventilator [] BIPAP  [] Nasal Cannula [] Room Air    VENT SETTINGS (Comprehensive) (if applicable):    Vent Information  $Ventilation: $Subsequent Day  Ventilator Started: Yes  Ventilation Day(s): 15  Skin Assessment: Clean, dry, & intact  Equipment ID: #[id number]  Equipment Changed: HME  Vent Type: Servo i  Vent Mode: AC/PC  Vt Ordered: 360 mL  Pressure Ordered: 20  Rate Set: 18 bmp  Pressure Support: 12 cmH20  FiO2 : 30 %  Sensitivity: 3  PEEP/CPAP: 8  I Time/ I Time %: 0.7 s  Cuff Pressure (cm H2O): (mov)  Humidification Source: HME  Nitric Oxide/Epoprostenol In Use?: No  Additional Respiratory  Assessments  Pulse: 92  Resp: 18  SpO2: 97 %  End Tidal CO2: 40 (%)  pCO2 (TCOM, mmHg): 41 mmHg  Position: Semi-Handley's  Humidification Source: HME  Oral Care Completed?: Yes  Oral Care: Mouth suctioned  Subglottic Suction Done?: Yes  Cuff Pressure (cm H2O): (mov)  Skin barrier applied: No    ABGs:     Lab Results   Component Value Date    QTI7GMP 41 03/13/2020    FIO2 30.0 03/13/2020     Lactic Acid:   Lab Results   Component Value Date    LACTA NOT REPORTED 02/28/2020         DATA:  Complete Blood Count:   Recent Labs     03/11/20  0555 03/12/20  0437 03/13/20  0557   WBC 11.3 8.8 6.9   HGB 10.3* 10.1* 10.1*   .3* 102.4 100.3    243 248   RBC 3.39* 3.27* 3.29*   HCT 37.4 33.5* 33.0*   MCH 30.4 30.9 30.7   MCHC 27.5* 30.1 30.6   RDW 15.5* 15.0* 14.7*   MPV 11.5 11.3 11.5        PT/INR:  No results found for: PROTIME, INR  PTT:    Lab Results   Component Value Date    APTT 59.6 03/13/2020       Basal Metabolic Profile:   Recent Labs     03/11/20  0555 03/12/20  0437 03/13/20  0557   * 140 139   K 3.6* 3.0* 4.1   BUN 48* 39* 29*   CREATININE 0.70 0.62 0.47*    91* 94*   CO2 32* 37* 35*      Magnesium:   Lab Results   Component Value Date    MG 2.0 03/12/2020    MG 1.9 03/10/2020    MG 1.9 03/08/2020     Phosphorus: No results found for: PHOS  S. Calcium:  Recent Labs     03/13/20  0557   CALCIUM 8.9     S. Ionized Calcium:No results for input(s): IONCA in the last 72 hours. Urinalysis:   Lab Results   Component Value Date    NITRU NEGATIVE 03/01/2020    COLORU YELLOW 03/01/2020    PHUR 5.0 03/01/2020    WBCUA 5 TO 10 03/01/2020    RBCUA TOO NUMEROUS TO COUNT 03/01/2020    MUCUS NOT REPORTED 03/01/2020    TRICHOMONAS NOT REPORTED 03/01/2020    YEAST NOT REPORTED 03/01/2020    BACTERIA NOT REPORTED 03/01/2020    SPECGRAV 1.014 03/01/2020    LEUKOCYTESUR NEGATIVE 03/01/2020    UROBILINOGEN Normal 03/01/2020    BILIRUBINUR NEGATIVE 03/01/2020    GLUCOSEU TRACE 03/01/2020    KETUA NEGATIVE 03/01/2020    AMORPHOUS NOT REPORTED 03/01/2020       CARDIAC ENZYMES: No results for input(s): CKMB, CKMBINDEX, TROPONINI in the last 72 hours. Invalid input(s): CKTOTAL;3  BNP: No results for input(s): BNP in the last 72 hours. LFTS  No results for input(s): ALKPHOS, ALT, AST, BILITOT, BILIDIR, LABALBU in the last 72 hours. AMYLASE/LIPASE/AMMONIA  No results for input(s): AMYLASE, LIPASE, AMMONIA in the last 72 hours.     Last 3 Blood Glucose:   Recent Labs     03/11/20  0555 03/12/20  0437 03/13/20  0557   GLUCOSE 156* 82 125*      HgBA1c:    Lab Results   Component Value Date    LABA1C 7.1 02/29/2020         TSH:  No results found for: TSH  ANEMIA STUDIES  No results for input(s): LABIRON, TIBC, FERRITIN, MI-resolved. on  heparin drip. ECho showed no wall motion abnormality. Will transition to oral anticoagulants as able.     07. History of PE: on heparin gtt for now. Will transition to oral anticoagulants as able.   12. Essential hypertension. Labile blood pressure. lop 25 BID. 13. Conjunctival hemorrhage. Stable for now. Ophthalmology consult. DVT prophylaxis: On heparin drip. GI prophylaxis-IV Protonix 40 mg daily  Aggressive PT OT     CODE STATUS: DNR CCA currently. Palliative care following. Will discuss with POA for Trach/PEG         Harrison Suarez M.D. Department of Internal Medicine/ Critical care  [facility])             3/13/2020, 9:50 AM    Attending Physician Statement  I have discussed the care of Isabelle Mireles, including pertinent history and exam findings with the resident. I have reviewed the key elements of all parts of the encounter with the resident. I have seen and examined the patient with the resident. I agree with the assessment and plan and status of the problem list as documented. I have seen the patient during my round today, I have reviewed the chart, lab seen and medications reviewed. She is on propofol drip she is arousable easily some with spontaneous movement noted but she did not follow commands while she was on propofol. Her arterial blood gas today showed 7.3 8/66/65/38 on ventilator setting PC 20/18/8/30%, she was not able to tolerate a spontaneous breathing trial.  She does have both upper extremities edema present with mild skin weeping, however left eye look better she is tolerating heparin drip. Continue with DuoNeb aerosol, on prednisone taper dose.   Continue on Lantus blood sugar is better controlled she is aspirin statins also    Dr. Kelly Host with son again today he will wait until Sunday to talk to the other brother before making final decision as patient while she was off sedation indicated that she does not want tracheostomy or PEG. Discussed with nursing staff, treatment plan discussed. [facility]  Please note that this chart was generated using voice recognition Dragon dictation software. Although every effort was made to ensure the accuracy of this automated transcription, some errors in transcription may have occurred.         Tom Mandel MD  3/13/2020 2:09 PM

## 2020-03-14 NOTE — PLAN OF CARE
Problem: Fluid Volume - Imbalance:  Goal: Absence of imbalanced fluid volume signs and symptoms  Description: Absence of imbalanced fluid volume signs and symptoms  3/13/2020 2257 by Wayne Duenas RN  Outcome: Ongoing  3/13/2020 2041 by Bhavik Pinon RN  Outcome: Ongoing     Problem: Skin Integrity/Risk  Goal: No skin breakdown during hospitalization  3/13/2020 2257 by Wayne Duenas RN  Outcome: Ongoing  3/13/2020 2041 by Bhavik Pinon RN  Outcome: Ongoing  Goal: Wound healing  3/13/2020 2257 by Wayne Duenas RN  Outcome: Ongoing  3/13/2020 2041 by Bhavik Pinon RN  Outcome: Ongoing     Problem: OXYGENATION/RESPIRATORY FUNCTION  Goal: Patient will maintain patent airway  3/13/2020 2257 by Wayne Duenas RN  Outcome: Ongoing  3/13/2020 2041 by Bhavik Pinon RN  Outcome: Ongoing  3/13/2020 0902 by Antonio Leone RCP  Outcome: Ongoing  Goal: Patient will achieve/maintain normal respiratory rate/effort  Description: Respiratory rate and effort will be within normal limits for the patient  3/13/2020 2257 by Wayne Duenas RN  Outcome: Ongoing  3/13/2020 2041 by Bhavik Pinon RN  Outcome: Ongoing  3/13/2020 2038 by Silvia Baron RCP  Outcome: Ongoing  3/13/2020 0902 by Antonio Leone RCP  Outcome: Ongoing     Problem: MECHANICAL VENTILATION  Goal: Patient will maintain patent airway  3/13/2020 2257 by Wayne Duenas RN  Outcome: Ongoing  3/13/2020 2041 by Bhavik Pinon RN  Outcome: Ongoing  3/13/2020 2038 by Silvia Baron RCP  Outcome: Ongoing  3/13/2020 0902 by Antonio Leone RCP  Outcome: Ongoing  Goal: Oral health is maintained or improved  3/13/2020 2257 by Wayne Duenas RN  Outcome: Ongoing  3/13/2020 2041 by Bhavik Pinon RN  Outcome: Ongoing  3/13/2020 2038 by Silvia Baron RCP  Outcome: Ongoing  3/13/2020 0902 by Antonio Leone RCP  Outcome: Ongoing  Goal: ET tube will be managed safely  3/13/2020 2257 by Wayne Duenas RN  Outcome: Ongoing  3/13/2020 2041 by Wilma Marte RN  Outcome: Ongoing  3/13/2020 2038 by Haylee Cote RCP  Outcome: Ongoing  3/13/2020 0902 by Norma Kerns RCP  Outcome: Ongoing  Goal: Ability to express needs and understand communication  3/13/2020 2257 by Andrez Alcazar RN  Outcome: Ongoing  3/13/2020 2041 by Wilma Marte RN  Outcome: Ongoing  3/13/2020 2038 by Haylee Cote RCP  Outcome: Ongoing  3/13/2020 0902 by Norma eKrns RCP  Outcome: Ongoing  Goal: Mobility/activity is maintained at optimum level for patient  3/13/2020 2257 by Andrez Alcazar RN  Outcome: Ongoing  3/13/2020 2041 by Wilma Marte RN  Outcome: Ongoing  3/13/2020 2038 by Haylee Cote RCP  Outcome: Ongoing  3/13/2020 0902 by Norma Kerns RCP  Outcome: Ongoing     Problem: SKIN INTEGRITY  Goal: Skin integrity is maintained or improved  3/13/2020 2257 by Andrez Alcazar RN  Outcome: Ongoing  3/13/2020 2041 by Wilma Marte RN  Outcome: Ongoing  3/13/2020 0902 by Norma Kerns RCP  Outcome: Ongoing     Problem: Falls - Risk of:  Goal: Will remain free from falls  Description: Will remain free from falls  3/13/2020 2257 by Andrez Alcazar RN  Outcome: Ongoing  3/13/2020 2041 by Wilma Marte RN  Outcome: Met This Shift  Goal: Absence of physical injury  Description: Absence of physical injury  3/13/2020 2257 by Andrez Alcazar RN  Outcome: Ongoing  3/13/2020 2041 by Wilma Marte RN  Outcome: Met This Shift     Problem: Risk for Impaired Skin Integrity  Goal: Tissue integrity - skin and mucous membranes  Description: Structural intactness and normal physiological function of skin and  mucous membranes.   3/13/2020 2257 by Andrez Alcazar RN  Outcome: Ongoing  3/13/2020 2041 by Wilma Marte RN  Outcome: Ongoing     Problem: Nutrition  Goal: Optimal nutrition therapy  3/13/2020 2257 by Andrez Alcazar RN  Outcome: Ongoing  3/13/2020 2041 by Wilma Marte RN  Outcome: Ongoing Problem: Pain:  Goal: Pain level will decrease  Description: Pain level will decrease  3/13/2020 2257 by Amadeo Burrows RN  Outcome: Ongoing  3/13/2020 2041 by Jonnie Le RN  Outcome: Ongoing  Goal: Control of acute pain  Description: Control of acute pain  3/13/2020 2257 by Amadeo Burrows RN  Outcome: Ongoing  3/13/2020 2041 by Jonnie Le RN  Outcome: Ongoing  Goal: Control of chronic pain  Description: Control of chronic pain  3/13/2020 2257 by Amadeo Burrows RN  Outcome: Ongoing  3/13/2020 2041 by Jonnie Le RN  Outcome: Ongoing     Problem: Restraint Use - Nonviolent/Non-Self-Destructive Behavior:  Goal: Absence of restraint indications  Description: Absence of restraint indications  3/13/2020 2257 by Amdaeo Burrows RN  Outcome: Ongoing  3/13/2020 2041 by Jonnie Le RN  Outcome: Not Met This Shift  Goal: Absence of restraint-related injury  Description: Absence of restraint-related injury  3/13/2020 2257 by Amadeo Burrows RN  Outcome: Ongoing  3/13/2020 2041 by Jonnie Le RN  Outcome: Met This Shift     Problem: Musculor/Skeletal Functional Status  Goal: Highest potential functional level  3/13/2020 2257 by Amadeo Burrows RN  Outcome: Ongoing  3/13/2020 2041 by Jonnie Le RN  Outcome: Ongoing

## 2020-03-14 NOTE — PROGRESS NOTES
Critical Care Team - Daily Progress Note      Date and time: 3/14/2020 8:46 AM  Patient's name: Martha Malloy Record Number: [id number]  Patient's account/billing number: [de-identified]  Patient's YOB: 1945  Age: 67 y.o. Date of Admission: 2/28/2020 12:38 PM  Length of stay during current admission: 15      Primary Care Physician: Pat Suarez  ICU Attending Physician: Dr. Bong Todd    Code Status: DNR-CCA    Reason for ICU admission: Acute respiratory failure s/p intubation      SUBJECTIVE:   Patient has history of COPD, chronic hypoxemic respiratory failure, pulmonary embolism, diastolic heart failure.  He presented to outside hospital with dyspnea.  She was recently discharged after treatment for COPD exacerbation and respiratory failure.  Patient required intubation and mechanical ventilation due to worsening respiratory failure. OVERNIGHT EVENTS:  Overnight patient had 2 episode of desaturation SaO2 down to 84%. Chest x-ray was done Patchy left infrahilar opacities, concerning for pneumonia. She is still intubated on PC mode 18/35%/PC above PEEP 20/PEEP 8. ABG this morning 7.35/75/97/42. UOP 1530 in last 24 hours.        CURRENT VENTILATION STATUS:     [x] Ventilator  [] BIPAP  [] Nasal Cannula [] Room Air      SECRETIONS Amount:  [] Small [x] Moderate  [] Large  [] None  Color:     [x] White [] Colored  [] Bloody    SEDATION:  RAAS Score:  [] Propofol gtt  [] Versed gtt  [] Ativan gtt   [x] No Sedation    PARALYZED:  [x] No    [] Yes    DIARRHEA:                [x] No                [] Yes  (C. Difficile status: [] positive                                                                                                                       [] negative                                                                                                                     [] pending)    VASOPRESSORS:  [x] No    [] Yes    If yes -   [] Levophed       [] Dopamine     [] Vasopressin       [] Dobutamine  [] Phenylephrine         [] Epinephrine    CENTRAL LINES:     [] No   [x] Yes   (Date of Insertion:   )           If yes -     [] Right IJ     [] Left IJ [] Right Femoral [] Left Femoral                   [] Right Subclavian [] Left Subclavian       GRIER'S CATHETER:   [x] No   [] Yes  (Date of Insertion:   )     URINE OUTPUT:            [x] Good   [] Low              [] Anuric  OBJECTIVE:   VITAL SIGNS:  BP (!) 118/57   Pulse 96   Temp 98 °F (36.7 °C) (Oral)   Resp 22   Ht 5' (1.524 m)   Wt 150 lb 5.7 oz (68.2 kg)   SpO2 96%   BMI 29.36 kg/m²   Tmax over 24 hours:  Temp (24hrs), Av.7 °F (36.5 °C), Min:97.2 °F (36.2 °C), Max:98.1 °F (36.7 °C)    Patient Vitals for the past 8 hrs:   BP Temp Temp src Pulse Resp SpO2 Weight   20 0801 -- -- -- 96 22 96 % --   20 0600 (!) 118/57 -- -- 93 23 97 % --   20 0502 (!) 93/52 -- -- 97 18 95 % --   20 0500 (!) 91/32 -- -- 96 17 95 % --   20 0432 -- -- -- 100 21 98 % --   20 0400 120/74 98 °F (36.7 °C) Oral 103 29 99 % 150 lb 5.7 oz (68.2 kg)   20 0352 -- -- -- 106 (!) 31 93 % --   20 0300 (!) 102/46 -- -- 97 17 100 % --   20 0200 (!) 145/57 -- -- 96 19 93 % --   20 0100 (!) 143/63 -- -- 91 23 93 % --         Intake/Output Summary (Last 24 hours) at 3/14/2020 0846  Last data filed at 3/14/2020 0630  Gross per 24 hour   Intake 2249 ml   Output 1530 ml   Net 719 ml     Date 20 0000 - 20 2359   Shift 0079-0373 6590-9664 4615-0088 24 Hour Total   INTAKE   I.V.(mL/kg) 177(2.6)   177(2.6)   NG/GT(mL/kg) 803(11.8)   803(11.8)   Shift Total(mL/kg) 980(14.4)   980(14.4)   OUTPUT   Urine(mL/kg/hr) 400(0.7)   400   Shift Total(mL/kg) 400(5.9)   400(5.9)   Weight (kg) 68.2 68.2 68.2 68.2     Wt Readings from Last 3 Encounters:   20 150 lb 5.7 oz (68.2 kg)     Body mass index is 29.36 kg/m².         PHYSICAL EXAM:  Constitutional: Intubated, moving all extremities but not following any commands. EENT: Left red eye with exudates-improving  Respiratory: clear to auscultation, no wheezes/rales.   Cardiovascular: SVT with HR 120sregular rate and rhythm, normal S1, S2, no murmur noted and 2+ pulses throughout  Abdomen: soft, nontender, nondistended, no masses or organomegaly  NEUROLOGIC intubated,  awake, moving all extremities   Extremities:  Upper extremity edema bilaterally  SKIN: All over multiple bruises     Any additional physical findings:      MEDICATIONS:  Scheduled Meds:   bacitracin-polymyxin b   Left Eye 4x Daily    predniSONE  30 mg Oral Daily    [START ON 3/16/2020] predniSONE  20 mg Oral Daily    insulin glargine  20 Units Subcutaneous Nightly    [START ON 3/19/2020] predniSONE  10 mg Oral Daily    insulin lispro  0-18 Units Subcutaneous TID WC    insulin lispro  0-9 Units Subcutaneous Nightly    metoprolol tartrate  25 mg Oral BID    trimethoprim-polymyxin b  1 drop Left Eye 6 times per day    lansoprazole  30 mg Oral Daily    ipratropium-albuterol  1 ampule Inhalation Q4H WA    albuterol  2.5 mg Nebulization BID    aspirin  81 mg Oral Daily    atorvastatin  40 mg Oral Nightly    docusate  100 mg Per NG tube BID    sodium chloride flush  10 mL Intravenous 2 times per day     Continuous Infusions:   propofol Stopped (03/14/20 0807)    heparin (porcine) 18 Units/kg/hr (03/14/20 0010)    sodium chloride 25 mL/hr at 03/13/20 0204    dextrose       PRN Meds:   potassium chloride, 20 mEq, PRN  midodrine, 5 mg, PRN  fentanNYL, 50 mcg, Q1H PRN  metoprolol, 5 mg, Q4H PRN  heparin (porcine), 80 Units/kg, PRN  heparin (porcine), 40 Units/kg, PRN  labetalol, 10 mg, Q4H PRN  glucose, 15 g, PRN  dextrose, 12.5 g, PRN  glucagon (rDNA), 1 mg, PRN  dextrose, 100 mL/hr, PRN  sodium chloride flush, 10 mL, PRN  acetaminophen, 650 mg, Q6H PRN    Or  acetaminophen, 650 mg, Q6H PRN  polyethylene glycol, 17 g, Daily PRN  promethazine, 12.5 mg, Q6H PRN    Or  ondansetron, 4 mg, Q6H for: PHOS  S. Calcium:  Recent Labs     03/14/20  0221   CALCIUM 9.0     S. Ionized Calcium:No results for input(s): IONCA in the last 72 hours. Urinalysis:   Lab Results   Component Value Date    NITRU NEGATIVE 03/01/2020    COLORU YELLOW 03/01/2020    PHUR 5.0 03/01/2020    WBCUA 5 TO 10 03/01/2020    RBCUA TOO NUMEROUS TO COUNT 03/01/2020    MUCUS NOT REPORTED 03/01/2020    TRICHOMONAS NOT REPORTED 03/01/2020    YEAST NOT REPORTED 03/01/2020    BACTERIA NOT REPORTED 03/01/2020    SPECGRAV 1.014 03/01/2020    LEUKOCYTESUR NEGATIVE 03/01/2020    UROBILINOGEN Normal 03/01/2020    BILIRUBINUR NEGATIVE 03/01/2020    GLUCOSEU TRACE 03/01/2020    KETUA NEGATIVE 03/01/2020    AMORPHOUS NOT REPORTED 03/01/2020       CARDIAC ENZYMES: No results for input(s): CKMB, CKMBINDEX, TROPONINI in the last 72 hours. Invalid input(s): CKTOTAL;3  BNP: No results for input(s): BNP in the last 72 hours. LFTS  No results for input(s): ALKPHOS, ALT, AST, BILITOT, BILIDIR, LABALBU in the last 72 hours. AMYLASE/LIPASE/AMMONIA  No results for input(s): AMYLASE, LIPASE, AMMONIA in the last 72 hours. Last 3 Blood Glucose:   Recent Labs     03/12/20  0437 03/13/20  0557 03/14/20  0221   GLUCOSE 82 125* 90      HgBA1c:    Lab Results   Component Value Date    LABA1C 7.1 02/29/2020     TSH:  No results found for: TSH  ANEMIA STUDIES  No results for input(s): LABIRON, TIBC, FERRITIN, MUINARYR10, FOLATE, OCCULTBLD in the last 72 hours. Cultures during this admission:     Blood cultures:                 [] None drawn      [x] Negative             []  Positive (Details:  )  Urine Culture:                   [] None drawn      [x] Negative             []  Positive (Details:  )    Respiratory culture positive for influenza A. Chest Xray (3/14/2020):Patchy left infrahilar opacities, concerning for pneumonia.     ASSESSMENT:      Principal Problem:    Acute hypercapnic respiratory failure (HCC)  Active Problems:    Respiratory care of Kecia Bear, including pertinent history and exam findings with the resident. I have reviewed the key elements of all parts of the encounter with the resident. I have seen and examined the patient with the resident. I agree with the assessment and plan and status of the problem list as documented. I have seen the patient during my round today, I have reviewed the chart, lab seen and arterial blood gases seen. There is no overall change this morning she was arousable when I saw her she was on low-dose propofol usually but at that time she was off propofol. ABG seen and showed 735/75/105/42 on vent support vent support with PC/18/20/8/35%  Endotracheal secretions remain small to moderate and she had severely reduced and distant breath sound without wheezing. She does follow commands with upper extremities intermittently did not move lower extremities on command but did move spontaneously. Currently her CODE STATUS is DNR CCA and family is deciding tomorrow about possibly withdrawal of support or tracheostomy and they are waiting for 1 more son to come. We will continue supportive care, ventilatory support, prednisone, DuoNeb, tube feeding and she is currently on heparin drip which she is tolerating. Discussed with nursing staff, treatment plan discussed. Total critical care time caring for this patient with life threatening, unstable organ failure, including direct patient contact, management of life support systems, review of data including imaging and labs, discussions with other team members and physicians at least 27  Min so far today, excluding procedures. Please note that this chart was generated using voice recognition Dragon dictation software. Although every effort was made to ensure the accuracy of this automated transcription, some errors in transcription may have occurred.       Leigh Ann Croft MD  3/14/2020 2:19 PM

## 2020-03-14 NOTE — PLAN OF CARE
Problem: OXYGENATION/RESPIRATORY FUNCTION  Goal: Patient will maintain patent airway  3/14/2020 0818 by Shira Van, RCP  Outcome: Ongoing  3/13/2020 2257 by Leroy Mathew RN  Outcome: Ongoing  3/13/2020 2041 by Alen Chapa RN  Outcome: Ongoing  Goal: Patient will achieve/maintain normal respiratory rate/effort  Description: Respiratory rate and effort will be within normal limits for the patient  3/14/2020 0818 by Shira Van, RCP  Outcome: Ongoing  3/13/2020 2257 by Leroy Mathew RN  Outcome: Ongoing  3/13/2020 2041 by Alen Chapa RN  Outcome: Ongoing  3/13/2020 2038 by Rama Knapp RCP  Outcome: Ongoing     Problem: MECHANICAL VENTILATION  Goal: Patient will maintain patent airway  3/14/2020 0818 by Shira Van, RCP  Outcome: Ongoing  3/13/2020 2257 by Leroy Mathew RN  Outcome: Ongoing  3/13/2020 2041 by Alen Chapa RN  Outcome: Ongoing  3/13/2020 2038 by Rama Knapp RCP  Outcome: Ongoing  Goal: Oral health is maintained or improved  3/14/2020 0818 by Shira Van, RCP  Outcome: Ongoing  3/13/2020 2257 by Leroy Mathew RN  Outcome: Ongoing  3/13/2020 2041 by Alen Chapa RN  Outcome: Ongoing  3/13/2020 2038 by Rama Knapp RCP  Outcome: Ongoing  Goal: ET tube will be managed safely  3/14/2020 0818 by Shira Van, RCP  Outcome: Ongoing  3/13/2020 2257 by Leroy Mathew RN  Outcome: Ongoing  3/13/2020 2041 by Alen Chapa RN  Outcome: Ongoing  3/13/2020 2038 by Rama Knapp RCP  Outcome: Ongoing  Goal: Ability to express needs and understand communication  3/14/2020 0818 by Shira Van RCP  Outcome: Ongoing  3/13/2020 2257 by Leroy Mathew RN  Outcome: Ongoing  3/13/2020 2041 by Alen Chapa RN  Outcome: Ongoing  3/13/2020 2038 by Rama Knapp RCP  Outcome: Ongoing  Goal: Mobility/activity is maintained at optimum level for patient  3/14/2020 0818 by Shira Van, RCP  Outcome: Ongoing  3/13/2020 225 by Leroy Mathew, RN  Outcome: Ongoing  3/13/2020 2041 by Simba Chamberlain RN  Outcome: Ongoing  3/13/2020 2038 by Zehra Guzman RCP  Outcome: Ongoing     Problem: SKIN INTEGRITY  Goal: Skin integrity is maintained or improved  3/14/2020 0818 by Bradley Fraga RCP  Outcome: Ongoing  3/13/2020 2257 by Teddy Keys RN  Outcome: Ongoing  3/13/2020 2041 by Simba Chamberlain RN  Outcome: Ongoing   BRONCHOSPASM/BRONCHOCONSTRICTION     [x]         IMPROVE AERATION/BREATH SOUNDS  [x]   ADMINISTER BRONCHODILATOR THERAPY AS APPROPRIATE  [x]   ASSESS BREATH SOUNDS  []   IMPLEMENT AEROSOL/MDI PROTOCOL  [x]   PATIENT EDUCATION AS NEEDED

## 2020-03-14 NOTE — PLAN OF CARE
Absence of restraint-related injury  Description: Absence of restraint-related injury  3/14/2020 1113 by Gregory Kenyon RN  Outcome: Ongoing  3/13/2020 2257 by Lora Mcintyre RN  Outcome: Ongoing     Problem: Musculor/Skeletal Functional Status  Goal: Highest potential functional level  3/14/2020 1113 by Gregory Kenyon RN  Outcome: Ongoing  3/13/2020 2257 by Lora Mcintyre RN  Outcome: Ongoing     Problem: Restraint Use - Nonviolent/Non-Self-Destructive Behavior:  Goal: Absence of restraint indications  Description: Absence of restraint indications  3/14/2020 1113 by Gregory Kenyon RN  Outcome: Not Met This Shift  Note: Continue with bilateral soft wrist restraints at this time due pt pt reaching for lines/tubes and unable to follow commands at this time. RN to continue to monitor.   3/13/2020 2257 by Lora Mcintyre RN  Outcome: Ongoing

## 2020-03-14 NOTE — PLAN OF CARE
Ongoing  Goal: Ability to express needs and understand communication  3/13/2020 2041 by Wilma Marte RN  Outcome: Ongoing  3/13/2020 2038 by Haylee Cote RCP  Outcome: Ongoing  3/13/2020 0902 by Norma Kerns RCP  Outcome: Ongoing  Goal: Mobility/activity is maintained at optimum level for patient  3/13/2020 2041 by Wilma Marte RN  Outcome: Ongoing  3/13/2020 2038 by Haylee Cote RCP  Outcome: Ongoing  3/13/2020 0902 by Norma Kerns RCP  Outcome: Ongoing     Problem: SKIN INTEGRITY  Goal: Skin integrity is maintained or improved  3/13/2020 2041 by Wilma Marte RN  Outcome: Ongoing  3/13/2020 0902 by Norma Kerns RCP  Outcome: Ongoing     Problem: Risk for Impaired Skin Integrity  Goal: Tissue integrity - skin and mucous membranes  Description: Structural intactness and normal physiological function of skin and  mucous membranes.   Outcome: Ongoing     Problem: Nutrition  Goal: Optimal nutrition therapy  Outcome: Ongoing     Problem: Pain:  Goal: Pain level will decrease  Description: Pain level will decrease  Outcome: Ongoing  Goal: Control of acute pain  Description: Control of acute pain  Outcome: Ongoing  Goal: Control of chronic pain  Description: Control of chronic pain  Outcome: Ongoing     Problem: Musculor/Skeletal Functional Status  Goal: Highest potential functional level  Outcome: Ongoing     Problem: Restraint Use - Nonviolent/Non-Self-Destructive Behavior:  Goal: Absence of restraint indications  Description: Absence of restraint indications  Outcome: Not Met This Shift     Problem: Respiratory  Intervention: Respiratory assessment  3/13/2020 2038 by Haylee Cote RCP  Note:   [address] SP02/ABG'S    [x]  IDENTIFY APPROPRIATE OXYGEN THERAPY  [x]   MONITOR SP02/ABG'S AS NEEDED   [x]   PATIENT EDUCATION AS NEEDED   BRONCHOSPASM/BRONCHOCONSTRICTION     [x]         IMPROVE AERATION/BREATH SOUNDS  [x]   ADMINISTER BRONCHODILATOR THERAPY AS

## 2020-03-15 NOTE — FLOWSHEET NOTE
met with patient and family (2 sons, grandson, and niece). Provided emotional support and prayer. Follow as needed/requested. 03/04/20 1440   Encounter Summary   Services provided to: Patient and family together;Family   Referral/Consult From: [facility]   (3/4/2020)   Complexity of Encounter High   Length of Encounter 30 minutes   Spiritual Assessment Completed Yes   Routine   Type Follow up   Assessment Calm; Unable to respond; Anxious   Intervention Explored feelings, thoughts, concerns;Sustaining presence/ Ministry of presence   Outcome Expressed gratitude;Comfort;Coping
Assessment: Patient is not awake/alert. Son is bedside and said he is more hopeful now than a few days ago. On Sunday he was ready to talk more about palliative measures. He shared that he is hearing different things from the medical staff about how much hope to have, and is looking forward to another conversation with doctors and family--perhaps tomorrow. He shared gratitude for the room at [facility] from Home but also how overwhelmed and sleep-deprived he feels. Intervention(s):  provided active listening and emotional support. Engaged family in conversation about their expectations and hopes for health outcomes and quality of life. Outcome and Plan: Family expressed gratitude for visit. Follow as needed/requested. 03/10/20 1310   Encounter Summary   Services provided to: Patient and family together   Referral/Consult From: [facility]   (3/10/2020)   Complexity of Encounter Moderate   Length of Encounter 15 minutes   Routine   Type Follow up   Assessment Coping   Intervention Sustaining presence/ Ministry of presence; Explored feelings, thoughts, concerns   Outcome Expressed gratitude;Receptive;Encouraged
Mini Antony is a Spiritism patient with no family present at time of visit. She was not alert/awake, and  provided a silent prayer. Follow as needed/requested.        03/04/20 0900   Encounter Summary   Services provided to: Patient   Referral/Consult From: [facility]   (3/4/2020)   Complexity of Encounter Low   Length of Encounter 15 minutes   Routine   Type Follow up   Assessment Unable to respond   Intervention Prayer   Outcome Did not respond
Patient intubated. No family present.  provided prayer. Follow as needed/requested.        03/05/20 1250   Encounter Summary   Services provided to: Patient   Referral/Consult From: [facility]   (3/5/2020)   Complexity of Encounter Low   Length of Encounter 15 minutes   Routine   Type Follow up   Assessment Unable to respond   Intervention Prayer   Outcome Did not respond
Patient is struggling with Influenza A. Patient is not awake/alert at time of visit. Per nurse, sons are active supports, one of whom sees the gravity of current condition and there may be a conversation about palliative care at some point. (Not currently on palliative list.)  No family present at time of visit. Patient is listed as Alevism.  provided silent prayer. Follow as needed/requested.        03/02/20 1031   Encounter Summary   Services provided to: Patient   Referral/Consult From: Nurse   Support System Family members   [facility]   (3/2/2020)   Complexity of Encounter Low   Length of Encounter 15 minutes   Routine   Type Initial   Assessment Unable to respond   Intervention Prayer   Outcome Did not respond
Patterson cath tube increased amount of sediment. RN strips tubing to rid it of sludge/sediment so urine can pass through. Will continue to monitor.
Pt baseline TOF was 4/4 at 20 amps. After titrated nimbex down based on TOF of zero twitches the pt was still moving. Titrated nimbex back up despite TOF. Dr. Lester Weber notified of tiration and blood gases.      Electronically signed by Shahram Solis RN on 2/29/2020 at 6:53 AM
RN, writer notified critical care team and RT Kaia of pts BP, and Pulse ox reading of 82% and went up to 88% [id number]% and suctioned. Dr Cliff Chavarria and RT came to bedside to evaluate. Dr. Cliff Chavarria recommended pt be starting on sedation such as propofol. RN placed order and started propofol. RN will continue to monitor.         03/11/20 1300   Vitals   Pulse 111   Resp (!) 31   BP (!) 195/83   MAP (mmHg) 109   Oxygen Therapy   SpO2 (!) 88 %   FiO2  30 %   End Tidal CO2 62 (%)   Vent Settings   Rate Set 18 bmp   Rate Measured 28 br/min   Vt Exhaled 301 mL   PEEP/CPAP 8   Peak Inspiratory Pressure 28 cmH2O
Readily followed commands at 1300, although developed,rapid RR, very dim breath sounds, shortly thereafter sedation with Diprovan, fluid bolus for low bp, lab results pending,
Completed Yes   Grief and Life Adjustment   Type Death   Assessment Unable to respond   Outcome Did not respond

## 2020-03-15 NOTE — PROGRESS NOTES
Insertion:   )           If yes -     []? Right IJ             []? Left IJ         []? Right Femoral        []? Left Femoral                   []? Right Subclavian           []? Left Subclavian        GRIER'S CATHETER:   []? No                      [x]? Yes  (Date of Insertion:   )      URINE OUTPUT:            [x]?  Good                  []? Low              []? Anuric    REVIEW OF SYSTEMS:    OBJECTIVE:     VITAL SIGNS:  BP (!) 94/48   Pulse 104   Temp 99.3 °F (37.4 °C) (Oral)   Resp 21   Ht 5' (1.524 m)   Wt 148 lb 9.4 oz (67.4 kg)   SpO2 93%   BMI 29.02 kg/m²   Tmax over 24 hours:  Temp (24hrs), Av.4 °F (36.9 °C), Min:98.1 °F (36.7 °C), Max:99.3 °F (37.4 °C)      Patient Vitals for the past 8 hrs:   BP Temp Temp src Pulse Resp SpO2 Weight   03/15/20 1100 (!) 94/48 -- -- 104 21 93 % --   03/15/20 1045 (!) 98/59 -- -- 105 19 94 % --   03/15/20 1030 (!) 105/46 -- -- 108 20 94 % --   03/15/20 1015 (!) 105/55 -- -- 109 17 94 % --   03/15/20 1000 89/72 -- -- 107 19 94 % --   03/15/20 0945 (!) 107/52 -- -- 107 23 95 % --   03/15/20 0930 (!) 110/51 -- -- 109 22 95 % --   03/15/20 0915 (!) 103/48 -- -- 105 18 94 % --   03/15/20 0900 (!) 92/55 -- -- 101 20 94 % --   03/15/20 0845 (!) 88/50 -- -- 101 18 94 % --   03/15/20 0830 (!) 100/46 -- -- 103 16 94 % --   03/15/20 0825 (!) 95/46 -- -- 103 19 94 % --   03/15/20 0820 (!) 104/55 -- -- 103 21 94 % --   03/15/20 0815 (!) 95/42 -- -- 101 22 93 % --   03/15/20 0810 (!) 89/47 -- -- 100 19 93 % --   03/15/20 0805 (!) 77/44 -- -- 104 19 92 % --   03/15/20 0802 -- -- -- (!) 211 25 94 % --   03/15/20 0801 (!) 128/33 -- -- 173 21 94 % --   03/15/20 0800 93/69 -- -- 122 21 94 % --   03/15/20 0745 (!) 84/41 99.3 °F (37.4 °C) Oral 121 19 93 % --   03/15/20 0740 -- -- -- 120 18 92 % --   03/15/20 0735 (!) 103/41 -- -- 123 21 93 % --   03/15/20 0730 (!) 105/42 -- -- 123 22 93 % --   03/15/20 0700 (!) 123/48 -- -- 122 24 94 % --   03/15/20 0600 (!) 145/55 -- -- 115 21 97 % -- WBC 6.9 6.4 4.3   HGB 10.1* 10.5* 10.4*   .3 102.4 101.8    236 218   RBC 3.29* 3.36* 3.40*   HCT 33.0* 34.4* 34.6*   MCH 30.7 31.3 30.6   MCHC 30.6 30.5 30.1   RDW 14.7* 14.9* 15.2*   MPV 11.5 11.4 11.4        PT/INR:  No results found for: PROTIME, INR  PTT:    Lab Results   Component Value Date    APTT 51.2 03/15/2020       Basal Metabolic Profile:   Recent Labs     03/13/20  0557 03/14/20  0221 03/15/20  0405    140 140   K 4.1 4.2 3.9   BUN 29* 25* 22   CREATININE 0.47* 0.42* 0.41*   CL 94* 94* 95*   CO2 35* 34* 34*      Magnesium:   Lab Results   Component Value Date    MG 2.0 03/12/2020    MG 1.9 03/10/2020    MG 1.9 03/08/2020     Phosphorus: No results found for: PHOS  S. Calcium:  Recent Labs     03/15/20  0405   CALCIUM 9.1     S. Ionized Calcium:No results for input(s): IONCA in the last 72 hours. Urinalysis:   Lab Results   Component Value Date    NITRU NEGATIVE 03/01/2020    COLORU YELLOW 03/01/2020    PHUR 5.0 03/01/2020    WBCUA 5 TO 10 03/01/2020    RBCUA TOO NUMEROUS TO COUNT 03/01/2020    MUCUS NOT REPORTED 03/01/2020    TRICHOMONAS NOT REPORTED 03/01/2020    YEAST NOT REPORTED 03/01/2020    BACTERIA NOT REPORTED 03/01/2020    SPECGRAV 1.014 03/01/2020    LEUKOCYTESUR NEGATIVE 03/01/2020    UROBILINOGEN Normal 03/01/2020    BILIRUBINUR NEGATIVE 03/01/2020    GLUCOSEU TRACE 03/01/2020    KETUA NEGATIVE 03/01/2020    AMORPHOUS NOT REPORTED 03/01/2020       CARDIAC ENZYMES: No results for input(s): CKMB, CKMBINDEX, TROPONINI in the last 72 hours. Invalid input(s): CKTOTAL;3  BNP: No results for input(s): BNP in the last 72 hours. LFTS  No results for input(s): ALKPHOS, ALT, AST, BILITOT, BILIDIR, LABALBU in the last 72 hours. AMYLASE/LIPASE/AMMONIA  No results for input(s): AMYLASE, LIPASE, AMMONIA in the last 72 hours.     Last 3 Blood Glucose:   Recent Labs     03/13/20  0557 03/14/20  0221 03/15/20  0405   GLUCOSE 125* 90 88      HgBA1c:    Lab Results Component Value Date    LABA1C 7.1 02/29/2020       TSH:  No results found for: TSH  ANEMIA STUDIES  No results for input(s): LABIRON, TIBC, FERRITIN, [id number], FOLATE, OCCULTBLD in the last 72 hours. Cultures during this admission:     Blood cultures:                 [] None drawn      [x] Negative             []  Positive (Details:  )  Urine Culture:                   [] None drawn      [x] Negative             []  Positive (Details:  )     Respiratory culture positive for influenza A.    ASSESSMENT:     Principal Problem:    Acute hypercapnic respiratory failure (HCC)  Active Problems:    Respiratory failure (HCC)    CKD (chronic kidney disease) stage 3, GFR 30-59 ml/min (HCC)    Pulmonary emboli (HCC)    Diastolic CHF ([facility] [address]    Hypertension    Pulmonary hypertension ([facility] [address]    Encounter for palliative care    Influenza A  Resolved Problems:    * No resolved hospital problems. *    PLAN:   1. Acute hypoxic hypercapnic respiratory failure: resolving slowly. Intubated. Currently on PC mode 18/8/45%. 2. BANDAR on CKD stage 3: Improved. Urine output 1.5/24 hrs. Nephrology signed off.    3. Hypernatremia. Resolved   4. Hypovolemic shock. Resolved. 5. Influenza A: Completed Tamiflu 30 mg twice daily. 6. COPD exacerbation: resolved. Continue DuoNeb, Proventil and prednisone taper. 7. Critical care illness polyneuropathy. Difficult to wean from ventilator. Aggressive physical therapy every day. 8. Diabetes mellitus type 2 likely [id number] this admission. Continue Lantus 20 U and High dose insulin sliding scale. Hypoglycemia protocol. 9. Leukocytosis. resolved Follow CBC daily.    10. NSTEMI/type II MI-resolved. on  heparin drip. ECho showed no wall motion abnormality. Will transition to oral anticoagulants as able. 11. History of PE: on heparin gtt for now. Will transition to oral anticoagulants as able. 12. Essential hypertension. Labile blood pressure. lop 25 BID. 13. Conjunctival hemorrhage.  Stable for will start comfort care measure and start morphine for distress will proceed with extubation discontinue all labs, and will provide oxygen for comfort, secretion management and suction if needed. Discussed with nursing staff, treatment plan discussed. Total critical care time caring for this patient with life threatening, unstable organ failure, including direct patient contact, management of life support systems, review of data including imaging and labs, discussions with other team members and physicians at least 27  Min so far today, excluding procedures. Please note that this chart was generated using voice recognition Dragon dictation software. Although every effort was made to ensure the accuracy of this automated transcription, some errors in transcription may have occurred.           Radhames Estrada MD  3/15/2020 11:46 AM

## 2020-03-15 NOTE — PLAN OF CARE
BRONCHOSPASM/BRONCHOCONSTRICTION     []         IMPROVE AERATION/BREATH SOUNDS  []   ADMINISTER BRONCHODILATOR THERAPY AS APPROPRIATE  []   ASSESS BREATH SOUNDS  []   IMPLEMENT AEROSOL/MDI PROTOCOL  []   PATIENT EDUCATION AS NEEDED   PROVIDE ADEQUATE OXYGENATION WITH ACCEPTABLE SP02/ABG'S    []  IDENTIFY APPROPRIATE OXYGEN THERAPY  []   MONITOR SP02/ABG'S AS NEEDED   []   PATIENT EDUCATION AS NEEDED     Problem: OXYGENATION/RESPIRATORY FUNCTION  Goal: Patient will maintain patent airway  3/14/2020 2021 by Bladimir Lott RCP  Outcome: Ongoing     Problem: OXYGENATION/RESPIRATORY FUNCTION  Goal: Patient will achieve/maintain normal respiratory rate/effort  Description: Respiratory rate and effort will be within normal limits for the patient  3/14/2020 2021 by Bladimir Lott RCP  Outcome: Ongoing     Problem: MECHANICAL VENTILATION  Goal: Patient will maintain patent airway  3/14/2020 2021 by Bladimir Lott RCP  Outcome: Ongoing     Problem: MECHANICAL VENTILATION  Goal: Oral health is maintained or improved  3/14/2020 2021 by Bladimir Lott RCP  Outcome: Ongoing     Problem: MECHANICAL VENTILATION  Goal: ET tube will be managed safely  3/14/2020 2021 by Bladimir Lott RCP  Outcome: Ongoing     Problem: MECHANICAL VENTILATION  Goal: Ability to express needs and understand communication  3/14/2020 2021 by Bladimir Lott RCP  Outcome: Ongoing     Problem: MECHANICAL VENTILATION  Goal: Mobility/activity is maintained at optimum level for patient  3/14/2020 2021 by Bladimir Lott RCP  Outcome: Ongoing     Problem: SKIN INTEGRITY  Goal: Skin integrity is maintained or improved  3/14/2020 2021 by Bladimir Lott RCP  Outcome: Ongoing

## 2020-03-15 NOTE — DISCHARGE SUMMARY
received diltiazem infusions. She developed subconjunctival hemorrhage initially which resolved over time. Patient was also put on Lantus and cover with high-dose sliding scale for hyperglycemia episodes. Patient had a asynchrony with ventilation and failed multiple spontaneous breathing trials. She also developed severe critical illness per neuropathy for which she was aggressively followed by physical therapy. She was improving over time. Patient's condition was explained to family time to time. Palliative care was also following to discuss CODE STATUS and goals of care. Patient has not been able to tolerate weaning so far with underlying severe COPD and and stage lung disease and poor functional status. Patient's son(POA) was explained need for tracheostomy and placement in nursing home to keep patient alive. Patient was kept off sedation and constantly declined for tracheostomy and going to nursing home. Patient's sons including POA discussed among themselves and changed CODE STATUS to DNR CC today morning. DNR CC orders were initiated this morning and patient was extubated. Patient was pronounced dead at 12.50 pm after assessing cardiopulmonary status. Consults:   Nephrology, cardiology, palliative care    Procedures: Intubation, central line. Any Hospital Acquired Infections: None    PATIENT'S DISCHARGE CONDITION:        Disposition: [facility]    Attending Physician Statement  I have discussed the care of Miguel Worthy, including pertinent history and exam findings with the resident. I have reviewed the key elements of all parts of the encounter with the resident. Please see progress note on 03/15/2020.     Tete Bailey MD  3/15/2020 7:36 PM

## 2020-03-15 NOTE — PROGRESS NOTES
ICU DEATH NOTE    PATIENT NAME: Evens Valdez  YOB: 1945  MEDICAL RECORD NO. [id number]  DATE: 3/15/2020  PRIMARY CARE PHYSICIAN: Jordyn Tillman    DIAGNOSIS OF DEATH     I have confirmed the death of this patient in accordance with accepted medical standards.   The patient is dead as evidenced by cardiac death or cessation of brain function:    Cardiac Death (check all that apply):     [x]  Absence of respiratory effort by observation     [x]  Absence of pulse by palpation     []  Absence of blood pressure by sphygmomanometry     []  Absence of sustainable cardiac rhythm by monitor    Death by Cessation of Brain Function (check all that apply):     []  Absence of Cerebral Function with no motor response     [x]  Absence of brain stem function by systemic physical exam     []  Failure to respond with respiratory drive by apnea test     []  Cerebral Electrical silence as interpreted by qualified reader        OR     []  Absence of brain blood flow by radiologic technique      CERTIFICATION OF DEATH     I have pronounced the patient dead on:     Date: 3/15/2020 at 12:50 PM     NOTIFICATIONS     Attending physician that will sign Death Certificate: Dr Ishaan Zimmerman notified: Name and/or Relationship:                                                          [x]  Per Nursing     notified (Name):                                                         [x]  Per Nursing      Sophie Montnao MD  Critical Care Resident,   Department of Internal Medicine/ Critical care  [address] [place] [address]   3/15/2020, 12:54 PM

## 2020-03-15 NOTE — PLAN OF CARE
Problem: Fluid Volume - Imbalance:  Goal: Absence of imbalanced fluid volume signs and symptoms  Description: Absence of imbalanced fluid volume signs and symptoms  3/14/2020 2341 by Goldy Newell RN  Outcome: Ongoing  3/14/2020 1113 by Bassam Raymond RN  Outcome: Ongoing     Problem: Skin Integrity/Risk  Goal: No skin breakdown during hospitalization  3/14/2020 2341 by Goldy Newell RN  Outcome: Ongoing  3/14/2020 1113 by Bassam Raymond RN  Outcome: Ongoing  Goal: Wound healing  Outcome: Ongoing     Problem: OXYGENATION/RESPIRATORY FUNCTION  Goal: Patient will maintain patent airway  3/14/2020 2341 by Goldy Newell RN  Outcome: Ongoing  3/14/2020 2021 by Manjinder Hairston RCP  Outcome: Ongoing  Goal: Patient will achieve/maintain normal respiratory rate/effort  Description: Respiratory rate and effort will be within normal limits for the patient  3/14/2020 2341 by Goldy Newell RN  Outcome: Ongoing  3/14/2020 2021 by Manjinder Hairston RCP  Outcome: Ongoing     Problem: MECHANICAL VENTILATION  Goal: Patient will maintain patent airway  3/14/2020 2341 by Goldy Newell RN  Outcome: Ongoing  3/14/2020 2021 by Manjinder Hairston RCP  Outcome: Ongoing  3/14/2020 1113 by Bassam Raymond RN  Outcome: Ongoing  Goal: Oral health is maintained or improved  3/14/2020 2341 by Goldy Newell RN  Outcome: Ongoing  3/14/2020 2021 by Manjinder Hairston RCP  Outcome: Ongoing  3/14/2020 1113 by Bassam Raymond RN  Outcome: Ongoing  Goal: ET tube will be managed safely  3/14/2020 2341 by Goldy Newell RN  Outcome: Ongoing  3/14/2020 2021 by Manjinder Hairston RCP  Outcome: Ongoing  3/14/2020 1113 by Bassam Raymond RN  Outcome: Ongoing  Goal: Ability to express needs and understand communication  3/14/2020 2341 by Goldy Newell RN  Outcome: Ongoing  3/14/2020 2021 by Manjinder Hairston RCP  Outcome: Ongoing  3/14/2020 1113 by Bassam Raymond RN  Outcome: Ongoing  Goal: Mobility/activity is maintained at optimum level for patient  3/14/2020 2341 by Goldy Newell RN  Outcome: Ongoing  3/14/2020 2021 by Manjinder Hairston RCP  Outcome: Ongoing  3/14/2020 1113 by Bassam Raymond RN  Outcome: Ongoing     Problem: SKIN INTEGRITY  Goal: Skin integrity is maintained or improved  3/14/2020 2341 by Goldy Newell RN  Outcome: Ongoing  3/14/2020 2021 by Manjinder Hairston RCP  Outcome: Ongoing  3/14/2020 1113 by Bassam Raymond RN  Outcome: Ongoing     Problem: Falls - Risk of:  Goal: Will remain free from falls  Description: Will remain free from falls  3/14/2020 2341 by Goldy Newell RN  Outcome: Ongoing  3/14/2020 1113 by Bassam Raymond RN  Outcome: Ongoing  Goal: Absence of physical injury  Description: Absence of physical injury  Outcome: Ongoing     Problem: Risk for Impaired Skin Integrity  Goal: Tissue integrity - skin and mucous membranes  Description: Structural intactness and normal physiological function of skin and  mucous membranes.   3/14/2020 2341 by Goldy Newell RN  Outcome: Ongoing  3/14/2020 1113 by Bassam Raymond RN  Outcome: Ongoing     Problem: Nutrition  Goal: Optimal nutrition therapy  3/14/2020 2341 by Goldy Newell RN  Outcome: Ongoing  3/14/2020 1113 by Bassam Raymond RN  Outcome: Ongoing     Problem: Pain:  Goal: Pain level will decrease  Description: Pain level will decrease  3/14/2020 2341 by Goldy Newell RN  Outcome: Ongoing  3/14/2020 1113 by Bassam Raymond RN  Outcome: Ongoing  Goal: Control of acute pain  Description: Control of acute pain  3/14/2020 2341 by Goldy Newell RN  Outcome: Ongoing  3/14/2020 1113 by Bassam Raymond RN  Outcome: Ongoing  Goal: Control of chronic pain  Description: Control of chronic pain  Outcome: Ongoing     Problem: Restraint Use - Nonviolent/Non-Self-Destructive Behavior:  Goal: Absence of restraint indications  Description: Absence of restraint

## 2020-03-15 NOTE — PLAN OF CARE
Problem: OXYGENATION/RESPIRATORY FUNCTION  Goal: Patient will maintain patent airway  Outcome: Ongoing  Goal: Patient will achieve/maintain normal respiratory rate/effort  Respiratory rate and effort will be within normal limits for the patient  Outcome: Ongoing     Problem: MECHANICAL VENTILATION  Goal: Patient will maintain patent airway  Outcome: Ongoing  Goal: Oral health is maintained or improved  Outcome: Ongoing  Goal: ET tube will be managed safely  Outcome: Ongoing  Goal: Ability to express needs and understand communication  Outcome: Ongoing  Goal: Mobility/activity is maintained at optimum level for patient  Outcome: Ongoing     Problem: ASPIRATION PRECAUTIONS  Goal: Patients risk of aspiration is minimized  Outcome: Ongoing     Problem: SKIN INTEGRITY  Goal: Skin integrity is maintained or improved  Outcome: OngoingBRONCHOSPASM/BRONCHOCONSTRICTION     [x]         IMPROVE AERATION/BREATH SOUNDS  [x]   ADMINISTER BRONCHODILATOR THERAPY AS APPROPRIATE  [x]   ASSESS BREATH SOUNDS  [x]   IMPLEMENT AEROSOL/MDI PROTOCOL  []   PATIENT EDUCATION AS NEEDED

## 2020-03-15 NOTE — PROGRESS NOTES
Pt's Code status changed to [facility] per family wishes(son -POA) in presence of son, RN, critical care team. Papers signed. Discussed and explained to family sons (POA). DNR-CC orders initiated. Terminal extubation order in.     Neetu London MD      Department of Internal Medicine  [facility]         3/15/2020, 11:53 AM

## 2020-03-16 LAB
EKG ATRIAL RATE: 119 BPM
EKG ATRIAL RATE: 220 BPM
EKG P AXIS: 82 DEGREES
EKG P-R INTERVAL: 128 MS
EKG Q-T INTERVAL: 208 MS
EKG Q-T INTERVAL: 304 MS
EKG QRS DURATION: 70 MS
EKG QRS DURATION: 70 MS
EKG QTC CALCULATION (BAZETT): 390 MS
EKG QTC CALCULATION (BAZETT): 427 MS
EKG R AXIS: 80 DEGREES
EKG R AXIS: 81 DEGREES
EKG T AXIS: -95 DEGREES
EKG T AXIS: 91 DEGREES
EKG VENTRICULAR RATE: 119 BPM
EKG VENTRICULAR RATE: 211 BPM

## 2020-03-17 ENCOUNTER — TELEPHONE (OUTPATIENT)
Dept: PULMONOLOGY | Age: 75
End: 2020-03-17

## 2020-03-17 LAB
CULTURE: ABNORMAL
CULTURE: ABNORMAL
Lab: ABNORMAL
SPECIMEN DESCRIPTION: ABNORMAL

## 2024-07-19 NOTE — PLAN OF CARE
RECALL: Last Colonoscopy on 6/17/2019 with Dr. [place]. Recommend repeat  6 months - 1 year      PROCEDURE: Colonoscopy (60932)    KIDNEY CONCERNS: No Suprep - Patient has a HX of  Kidney Stones    DIAGNOSIS: Colon Polyps Z86.010    DIABETIC: no    PROCEDURE LOCATION: ASC    SEDATION: MAC Only    ANTIPLATELET / ANTICOAGULATION: MEDICATION:  None    MEDICATION HOLDS:  -N/A    SPECIAL INSTRUCTIONS:   BMI 46.81  Renal Cyst    Patient's chart reviewed, please contact to schedule.     Angel Mccarthy RN  Outcome: Ongoing  3/11/2020 0817 by Catarina Vickers RCP  Outcome: Ongoing  3/11/2020 0744 by Karly Daniel RN  Outcome: Ongoing  Goal: Mobility/activity is maintained at optimum level for patient  3/11/2020 1228 by Angel Mccarthy RN  Outcome: Ongoing  3/11/2020 0817 by Catarina Vickers RCP  Outcome: Ongoing  3/11/2020 0744 by Karly Daniel RN  Outcome: Ongoing     Problem: SKIN INTEGRITY  Goal: Skin integrity is maintained or improved  3/11/2020 1228 by Angel Mccarthy RN  Outcome: Ongoing  3/11/2020 0817 by Catarina Vickers RCP  Outcome: Ongoing  3/11/2020 0744 by Karly Daniel RN  Outcome: Ongoing     Problem: Falls - Risk of:  Goal: Will remain free from falls  Description: Will remain free from falls  3/11/2020 1228 by Angel Mccarthy RN  Outcome: Ongoing  3/11/2020 0744 by Karly Daniel RN  Outcome: Ongoing  Goal: Absence of physical injury  Description: Absence of physical injury  3/11/2020 1228 by Angel Mccarthy RN  Outcome: Ongoing  3/11/2020 0744 by Karly Daniel RN  Outcome: Ongoing     Problem: Risk for Impaired Skin Integrity  Goal: Tissue integrity - skin and mucous membranes  Description: Structural intactness and normal physiological function of skin and  mucous membranes.   3/11/2020 1228 by Angel Mccarthy RN  Outcome: Ongoing  3/11/2020 0744 by Karly Daniel RN  Outcome: Ongoing     Problem: Nutrition  Goal: Optimal nutrition therapy  3/11/2020 1228 by Angel Mccarthy RN  Outcome: Ongoing  3/11/2020 0744 by Karly Daniel RN  Outcome: Ongoing     Problem: Pain:  Goal: Pain level will decrease  Description: Pain level will decrease  3/11/2020 1228 by Angel Mccarthy RN  Outcome: Ongoing  3/11/2020 0744 by Karly Daniel RN  Outcome: Ongoing  Goal: Control of acute pain  Description: Control of acute pain  3/11/2020 1228 by Angel Mccarthy RN  Outcome: Ongoing  3/11/2020 0744 by Karly Daniel RN  Outcome: Ongoing  Goal: Control of chronic

## 2024-08-13 NOTE — PROGRESS NOTES
Units, PRN  heparin 25,000 units in dextrose 5% 250 mL infusion, Continuous  metoprolol succinate (TOPROL XL) extended release tablet 25 mg, Daily  lansoprazole suspension SUSP 30 mg, Daily  phenylephrine (RICKY-SYNEPHRINE) 50 mg in dextrose 5 % 250 mL infusion, Continuous  0.9 % sodium chloride infusion, Continuous  ipratropium-albuterol (DUONEB) nebulizer solution 1 ampule, Q4H WA  albuterol (PROVENTIL) nebulizer solution 2.5 mg, BID  labetalol (NORMODYNE;TRANDATE) injection 10 mg, Q4H PRN  fentaNYL 20 mcg/mL Infusion, Continuous  aspirin chewable tablet 81 mg, Daily  atorvastatin (LIPITOR) tablet 40 mg, Nightly  docusate (COLACE) 50 MG/5ML liquid 100 mg, BID  glucose (GLUTOSE) 40 % oral gel 15 g, PRN  dextrose 50 % IV solution, PRN  glucagon (rDNA) injection 1 mg, PRN  dextrose 5 % solution, PRN  insulin lispro (HUMALOG) injection vial 0-12 Units, Q4H  sodium chloride flush 0.9 % injection 10 mL, 2 times per day  sodium chloride flush 0.9 % injection 10 mL, PRN  acetaminophen (TYLENOL) tablet 650 mg, Q6H PRN    Or  acetaminophen (TYLENOL) suppository 650 mg, Q6H PRN  polyethylene glycol (GLYCOLAX) packet 17 g, Daily PRN  promethazine (PHENERGAN) tablet 12.5 mg, Q6H PRN    Or  ondansetron (ZOFRAN) injection 4 mg, Q6H PRN  potassium chloride 10 mEq/100 mL IVPB (Peripheral Line), PRN  fentaNYL (SUBLIMAZE) injection 25 mcg, Q1H PRN          LABS      CBC:   Recent Labs     03/06/20  0459 03/06/20  1722 03/07/20  0502   WBC 11.5* 11.3 13.5*   RBC 3.82* 4.04 4.08   HGB 11.8* 12.4 12.4   HCT 36.3 38.6 39.6   MCV 95.0 95.5 97.1   MCH 30.9 30.7 30.4   MCHC 32.5 32.1 31.3   RDW 15.3* 15.0* 15.0*    210 216   MPV 11.8 11.3 11.7      BMP:   Recent Labs     03/06/20  0459 03/06/20  1722 03/07/20  0502    137 142   K 5.3 4.9 4.2   CL 86* 85* 90*   CO2 38* 37* 35*   * 98* 85*   CREATININE 2.05* 1.76* 1.34*   GLUCOSE 138* 250* 196*   CALCIUM 9.1 8.9 8.9    FOSTER:   Lab Results   Component Value Date    FOSTER noted.           Bladder:       Bladder could not be accurately assessed due to presence of a catheter.       Incidental small amount of ascites noted right upper quadrant.  Hepatic cysts   are demonstrated.           Impression   1.  No hydronephrosis.  Multiple small echoes in the papillary areas of the   kidney which may represent vascular calcifications or nonobstructing   papillary nephroliths.       2.  Superior pole cyst right kidney.       3.  Small amount of ascites right upper quadrant with appearance of hepatic   cysts.               ASSESSMENT      1. Acute kidney injury renal functions are improving. Output  2. Sinus tachycardia and supraventricular tachycardia critical care resident at bedside next   3. Hypertension is relatively better  4. Respiratory failure. On 30% FiO2 k  5. Mild hyperkalemia  6. Bolick alkalosis due to volume contraction with IV fluids CO2 is down to 35  PLAN      1. Continue IV fluid at 50 mL/h for next 24 hours  2. Management of SVT as per critical care service  3. Check magnesium levels  we will follow with you  Please do not hesitate to call with questions.   This note is created with the assistance of a speech-recognition program. While intending to generate a document that actually reflects the content of the visit, no guarantees can be provided that every mistake has been identified and corrected by editing  Electronically signed by Remedios Ugarte MD on 3/7/2020 at 9:37 AM      Electronically signed by Remedios Ugarte MD on 3/7/2020 at 9:37 AM fair minus

## 2024-12-20 NOTE — PLAN OF CARE
"Consultation - Behavioral Health   Samanthakiran Rodriguez 59 y.o. female MRN: [id number]  Unit/Bed#: -01 Encounter: [id number]    Assessment & Plan  Bipolar disorder (HCC)  See assessment and plan below under \"depressive disorder\"  Severe episode of recurrent major depressive disorder, without psychotic features (HCC)  Patient has a longstanding history of depression, generalized anxiety with panic attacks, PTSD, bipolar disorder. She has over six inpatient psychiatric hospitalizations due to the above, most recently from 11/29-12/2/24 at [facility] - admitted for depression secondary to chronic back pain and suicidal ideation. Currently she is mildly disoriented - awake and intermittently alert. She is oriented to person, place, and time, however delayed in cognition and impaired in judgement/attention.    No barriers to discharge to acute rehab/skilled nursing facility from psychiatric perspective  Continue current psychiatric medication regimen  Aripiprazole 10 mg daily  Buspar 15 mg TID  Hydroxyzine 25 mg TID  Sertraline 200 mg HS  Trazodone 300 mg HS  Ingrezza 60 mg daily  Rest of care per primary team  Patient remains slightly disoriented, which can be expected post operatively. From a behavioral and psychiatric standpoint she is stable without intention for self-harm or harm to others.  Psychiatry to sign off at this time.Please reach out with any acute concerns     Diagnoses, available treatment options, alternatives to treatment, and risks vs. benefits of current psychiatric treatment plan were discussed with the patient.  Prior records were reviewed in [employer].  The case was discussed with the primary team.    Scheduled medications:  Current Facility-Administered Medications   Medication Dose Route Frequency Provider Last Rate    acetaminophen  650 mg Oral Q6H [facility] Eleni Benz PA-C      aluminum-magnesium hydroxide-simethicone  30 mL Oral Q6H PRN Eleni Benz PA-C      " Problem: Fluid Volume - Imbalance:  Goal: Absence of imbalanced fluid volume signs and symptoms  Description  Absence of imbalanced fluid volume signs and symptoms  3/6/2020 1928 by Mini Chacon RN  Outcome: Ongoing     Problem: Skin Integrity/Risk  Goal: No skin breakdown during hospitalization  3/6/2020 1928 by Mini Chacon RN  Outcome: Ongoing     Problem: Skin Integrity/Risk  Goal: Wound healing  3/6/2020 1928 by Mini Chacon RN  Outcome: Ongoing     Problem: OXYGENATION/RESPIRATORY FUNCTION  Goal: Patient will maintain patent airway  3/6/2020 1928 by Mini Chacon RN  Outcome: Ongoing     Problem: OXYGENATION/RESPIRATORY FUNCTION  Goal: Patient will achieve/maintain normal respiratory rate/effort  Description  Respiratory rate and effort will be within normal limits for the patient  3/6/2020 1928 by Mini Chacon RN  Outcome: Ongoing     Problem: MECHANICAL VENTILATION  Goal: Oral health is maintained or improved  3/6/2020 1928 by Mini Chacon RN  Outcome: Ongoing     Problem: MECHANICAL VENTILATION  Goal: ET tube will be managed safely  3/6/2020 1928 by Mini Chacon RN  Outcome: Ongoing     Problem: MECHANICAL VENTILATION  Goal: Ability to express needs and understand communication  3/6/2020 1928 by Mini Chacon RN  Outcome: Ongoing     Problem: MECHANICAL VENTILATION  Goal: Mobility/activity is maintained at optimum level for patient  3/6/2020 1928 by Mini Chacon RN  Outcome: Ongoing     Problem: SKIN INTEGRITY  Goal: Skin integrity is maintained or improved  3/6/2020 1928 by Mini Chacon RN  Outcome: Ongoing     Problem: Falls - Risk of:  Goal: Will remain free from falls  Description  Will remain free from falls  3/6/2020 1928 by Mini Chacon RN  Outcome: Ongoing     Problem: Falls - Risk of:  Goal: Absence of physical injury  Description  Absence of physical injury  3/6/2020 1928 by Mini Chacon RN  Outcome: Ongoing     Problem: Risk for Impaired Skin Integrity  Goal: amLODIPine  5 mg Oral Daily Eleni Benz, PA-C      ARIPiprazole  10 mg Oral Daily Eleni Benz, PA-C      atorvastatin  40 mg Oral After Dinner Eleni Benz, JACQUI      busPIRone  15 mg Oral TID Eleni Benz, JACQUI      calcium carbonate  1,000 mg Oral Daily PRN Eleni Benz, JACQUI      docusate sodium  100 mg Oral BID Eleni Benz PA-C      heparin (porcine)  5,000 Units Subcutaneous Q8H [facility] Eleni Benz, PA-C      HYDROmorphone   Intravenous Continuous Oziel Pierce MD      hydrOXYzine HCL  25 mg Oral TID Eleni Benz, JACQUI      lactated ringers  50 mL/hr Intravenous Continuous Shweta Majo, CRNA 50 mL/hr (12/20/24 4428)    lubiprostone  8 mcg Oral BID Eleni Benz, JACQUI      methocarbamol  500 mg Oral Q6H [facility] Eleni Benz, PA-C      ondansetron  4 mg Intravenous Q6H PRN Eleni Benz, JACQUI      oxyCODONE  10 mg Oral Q4H PRN Eleni Benz, JACQUI      oxyCODONE  5 mg Oral Q4H PRN Eleni Benz, PA-DAMIAN      pantoprazole  40 mg Oral Early Morning ALISON SanchezC      prazosin  2 mg Oral HS Eleni Benz, JACQUI      pregabalin  150 mg Oral TID Eleni Benz, JACQUI      senna  1 tablet Oral Daily Eleni Benz PA-C      sertraline  200 mg Oral HS Eleni Benz, JACQUI      traZODone  300 mg Oral HS Eleni Benz, JACQUI      Valbenazine Tosylate  1 capsule Oral Daily Eleni Benz PA-C          PRN:    aluminum-magnesium hydroxide-simethicone    calcium carbonate    ondansetron    oxyCODONE    oxyCODONE    Chief Complaint: history of depression, bipolar, PTSD, anxiety    History of Present Illness   Physician Requesting Consult: Yenni Stevens MD  Reason for Consult / Principal Problem: history of depression, bipolar, PTSD anxiety; disoriented and agitated post operatively    Samantha CRISTIANO Michael is a 59 y.o. female with past medical history  of bipolar, tardive dyskinesia, hypertension, spinal stenosis of lumbar region, history of CVA, recurrent major depressive disorder, lumbar radiculopathy, fibromyalgia, cognitive disorder, generalized anxiety, mood insomnia, PTSD, tremor, vitamin D deficiency, obesity, ambulatory dysfunction, memory loss, bipolar, panic disorder. Interview is slightly limited secondary to patient's intermittent alertness.  Rest of history obtained through chart review.  Patient has an extensive psychiatric history, multiple inpatient hospitalization secondary to recurrent depression, anxiety, PTSD, suicidal ideation.  Currently patient is oriented to self, place, time.  She has no suicidal or homicidal ideation.  She denies visions, hallucinations.  She endorses some postoperative agitation secondary to pain and discomfort after the procedure.  She would like to go home at discharge.  She admits to being compliant with her current medication regimen, follows outpatient with psychiatry with Dr. Larsen.    Psychiatric Review Of Systems:  Medication side effects: none  Sleep: no change  Appetite: no change  Hygiene: able to tend to instrumental and basic ADLs  Anxiety Symptoms: denies  Psychotic Symptoms: denies  Depression Symptoms: denies  Manic Symptoms: denies  PTSD Symptoms: denies  Suicidal Thoughts: denies  Homicidal Thoughts: denies    Historical Information   Past Psychiatric History  Diagnoses: Depression, bipolar disorder, PTSD, anxiety  Medication History: Abilify, zoloft, buspar, trazodone, ingrezza, atarax  Inpatient: yes - most recently 11/19/24 - 12/2/24  Outpatient: yes - follow with Dr. Vaughn  Suicide Attempts: history of past suicide attempt by cutting her arm (several years ago)   Self Injurious Behavior: denies    Substance Abuse History:    Social History     Substance and Sexual Activity   Alcohol Use Not Currently    Comment: 2 x year; being a social drinker as per all scripts      Social History     Substance  and Sexual Activity   Drug Use No       IOP/Rehab: prior history of inpatient psychiatric hospitalizations. Currently follows closely with outpatient psychiatrist    I discussed substance abuse with the patient and, if pertinent, discussed risks vs benefits of decreasing frequency of use.    Family Psychiatric History:   Family History   Problem Relation Age of Onset    Colon cancer Mother     Alzheimer's disease Father     Stroke Father     No Known Problems Sister     Colon cancer Brother     Bipolar disorder Brother     No Known Problems Brother     Depression Paternal Grandfather     Breast cancer Maternal Aunt     Colon cancer Maternal Aunt     Seizures Son     Heart disease Other     Diabetes Other     Hypertension Other     Thyroid disease Neg Hx      Social History  Highest education: bachelor's degree in criminal justice   Currently living: two story home with daughter/son-in law in [place]  Relationships: , active relationships with children  Children: three   Occupation: on disability (previously worked in criminal justice, )  Gun Ownership: denies  : denies     Rest of social history as per below:  Social History     Socioeconomic History    Marital status:      Spouse name: Not on file    Number of children: 2    Years of education: graduate school     Highest education level: Not on file   Occupational History    Occupation: Disabled   Tobacco Use    Smoking status: Never    Smokeless tobacco: Never   Vaping Use    Vaping status: Never Used   Substance and Sexual Activity    Alcohol use: Not Currently     Comment: 2 x year; being a social drinker as per all scripts     Drug use: No    Sexual activity: Not Currently   Other Topics Concern    Not on file   Social History Narrative    Bereavement    Daily caffeine consumption, 6-8 servings per day     as per all scripts    Lives in [place]      Social Drivers of Health     Financial Resource Strain:  Low Risk  (11/19/2024)    Overall Financial Resource Strain (CARDIA)     Difficulty of Paying Living Expenses: Not very hard   Food Insecurity: No Food Insecurity (11/19/2024)    Hunger Vital Sign     Worried About Running Out of Food in the Last Year: Never true     Ran Out of Food in the Last Year: Never true   Transportation Needs: No Transportation Needs (11/19/2024)    PRAPARE - Transportation     Lack of Transportation (Medical): No     Lack of Transportation (Non-Medical): No   Physical Activity: Insufficiently Active (2/19/2024)    Exercise Vital Sign     Days of Exercise per Week: 5 days     Minutes of Exercise per Session: 20 min   Stress: No Stress Concern Present (2/19/2024)    [employer] [place] [employer] - Occupational Stress Questionnaire     Feeling of Stress : Not at all   Social Connections: Unknown (6/18/2024)    Received from [facility]     How often do you feel lonely or isolated from those around you? (Adult - for ages 18 years and over): Not on file   Intimate Partner Violence: Not At Risk (11/19/2024)    Humiliation, Afraid, Rape, and Kick questionnaire     Fear of Current or Ex-Partner: No     Emotionally Abused: No     Physically Abused: No     Sexually Abused: No   Housing Stability: Low Risk  (11/19/2024)    Housing Stability Vital Sign     Unable to Pay for Housing in the Last Year: No     Number of Times Moved in the Last Year: 0     Homeless in the Last Year: No   Recent Concern: Housing Stability - High Risk (9/20/2024)    Received from [facility]    Housing Stability Vital Sign     Unable to Pay for Housing in the Last Year: No     Number of Times Moved in the Last Year: 3     Homeless in the Last Year: No         Traumatic History:   Deferred    Medical Review Of Systems:  Review of Systems - History obtained from chart review and the patient  All other systems were reviewed and are negative    Relevant PMH/PSH:   Past Medical  History:   Diagnosis Date    Anxiety     Arthritis     left knee    Arthritis of right knee 10/06/2020    At risk for falls     Bipolar 2 disorder (HCC)     FOLLOWS WITH PSYCHIATRIST. CONTINUE LAMOTRIGINE; RESOLVED: 28JAN2016    Depression     Dysphagia     per [facility] facility paperwork    Familial tremor     both hands    Fibromyalgia     LAST ASSESSED: 08DEC2017    GERD (gastroesophageal reflux disease)     Hearing aid worn     left ear    [place] (hard of hearing)     left ear    Hyperlipidemia     Hypertension     Impaired speech     Left-sided weakness     Lumbar disc disease with radiculopathy 02/02/2018    Memory loss of unknown cause     long and short term    Migraine     Multiple closed fractures of metatarsal bone of right foot 05/02/2021    Obesity     Obesity, Class II, BMI 35-39.9     Osteoarthritis of both hips 10/31/2016    Osteoarthritis of knee 02/20/2013    Description: Continue Tylenol and Naproxen. Encourage exercise and weight loss. Patient refused physical therapy. I will refer the patient back to Orthopedics.    Overactive bladder     Panic attack     Patellofemoral disorder of both knees 05/01/2020    Post traumatic stress disorder     Primary localized osteoarthritis of both knees 06/16/2017    Primary osteoarthritis of both knees 05/01/2020    S/P insertion of spinal cord stimulator     no remote    S/P total knee arthroplasty, right 03/10/2022    Sacroiliitis (HCC) 02/28/2017    Seasonal allergies     Seizure-like activity (HCC) 06/03/2022    Seizures (HCC)     possible seizure like activity    Small bowel obstruction (HCC) 03/24/2023    Status post total knee replacement, left 07/05/2022    Stroke (HCC)     questionable stroke 2009    Tardive dyskinesia     PATIENT STATES    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  CONT ASA 81 MG DAILY; RESOLVED: [id number]    Urinary incontinence     Uses walker     Wears dentures     partial lower / full upper- doesnt wear    Wears  glasses      Past Surgical History:   Procedure Laterality Date    BACK SURGERY       SECTION      COLONOSCOPY      RESOLVED: 2016    EAR SURGERY      EGD      HYSTERECTOMY      LUMBAR FUSION N/A 2024    Procedure: Removal hardware L2-S1, posterior fusion L5-S1, navigated revision instrumentation L2 to pelvis; application of bilateral S2 alar screws and bone grafting of the disc at L5-S1;  Surgeon: Yenni Stevens MD;  [facility]: [facility];  Service: Orthopedics    MYRINGOTOMY W/ TUBES Left     NECK SURGERY  2019    [place] ARTHRP KNE CONDYLE&PLATU MEDIAL&LAT COMPARTMENTS Right 2022    Procedure: ARTHROPLASTY KNEE TOTAL;  Surgeon: Chandni Tuttle DO;  Location: [facility];  Service: Orthopedics    WV ARTHRP KNE CONDYLE&PLATU MEDIAL&LAT COMPARTMENTS Left 2022    Procedure: ARTHROPLASTY KNEE TOTAL;  Surgeon: Chandni Tuttle DO;  [facility];  Service: Orthopedics    WV CYSTOURETHROSCOPY N/A 2016    Procedure: CYSTOSCOPY, BOTOX INJECTION;  Surgeon: Abraham Teague MD;  [facility];  Service: Gynecology    WV INSJ/RPLCMT SPINAL NPG/RCVR POCKET CRTJ&CONNJ Right 02/10/2021    Procedure: REPLACEMENT IMPLANTABLE PULSE GENERATOR DORSAL SPINAL COLUMN STIMULATOR, RIGHT;  Surgeon: Carlos Alberto Jacome MD;  Location: [facility];  Service: Neurosurgery    WV PRQ IMPLTJ NSTIM ELECTRODE ARRAY EPIDURAL Right 2020    Procedure: INSERTION THORACIC DORSAL COLUMN SPINAL CORD STIMULATOR PERCUTANEOUS W IMPLANTABLE PULSE GENERATOR, RIGHT;  Surgeon: Carlos Alberto Jacome MD;  Location: [facility];  Service: Neurosurgery    TONSILLECTOMY      TUBAL LIGATION      UPPER GASTROINTESTINAL ENDOSCOPY  2020         Meds/Allergies   all current active meds have been reviewed and current meds:   Current Facility-Administered Medications:     acetaminophen (TYLENOL) tablet 650 mg, Q6H KESHIA    aluminum-magnesium hydroxide-simethicone (MAALOX) oral suspension 30 mL, Q6H PRN     "amLODIPine (NORVASC) tablet 5 mg, Daily    ARIPiprazole (ABILIFY) tablet 10 mg, Daily    atorvastatin (LIPITOR) tablet 40 mg, After Dinner    busPIRone (BUSPAR) tablet 15 mg, TID    calcium carbonate (TUMS) chewable tablet 1,000 mg, Daily PRN    docusate sodium (COLACE) capsule 100 mg, BID    heparin (porcine) subcutaneous injection 5,000 Units, Q8H KESHIA    HYDROmorphone (DILAUDID) 1 mg/mL 50 mL PCA, Continuous    hydrOXYzine HCL (ATARAX) tablet 25 mg, TID    lactated ringers infusion, Continuous, Last Rate: 50 mL/hr (12/20/24 0729)    lubiprostone (AMITIZA) capsule 8 mcg, BID    methocarbamol (ROBAXIN) tablet 500 mg, Q6H KESHIA    ondansetron (ZOFRAN) injection 4 mg, Q6H PRN    oxyCODONE (ROXICODONE) IR tablet 10 mg, Q4H PRN    oxyCODONE (ROXICODONE) IR tablet 5 mg, Q4H PRN    pantoprazole (PROTONIX) EC tablet 40 mg, Early Morning    prazosin (MINIPRESS) capsule 2 mg, HS    pregabalin (LYRICA) capsule 150 mg, TID    senna (SENOKOT) tablet 8.6 mg, Daily    sertraline (ZOLOFT) tablet 200 mg, HS    traZODone (DESYREL) tablet 300 mg, HS    Valbenazine Tosylate CAPS 1 capsule, Daily  No Known Allergies    Objective   Vital signs in last 24 hours:  Temp:  [97.2 °F (36.2 °C)-99.5 °F (37.5 °C)] 99 °F (37.2 °C)  HR:  [] 79  BP: ()/() 114/67  Resp:  [12-41] 17  SpO2:  [94 %-100 %] 95 %  O2 Device: None (Room air)  Nasal Cannula O2 Flow Rate (L/min):  [3 L/min] 3 L/min      Intake/Output Summary (Last 24 hours) at 12/20/2024 1403  Last data filed at 12/20/2024 1200  Gross per 24 hour   Intake 832.27 ml   Output 1100 ml   Net -267.73 ml       Mental Status Evaluation:  Appearance: disheveled, appears consistent with stated age  Motor: no psychomotor disturbances  Behavior: superficially cooperative  Speech: mumbled at times  Mood: \"fine\"  Affect: constricted  Thought Process: concrete  Thought Content: denies delusions or paranoia  Risk Potential: denies suicidal ideation, plan, or intent. Denies homicidal " ideation  Perceptions: denies auditory hallucinations, denies visual hallucinations,   Sensorium:  person, place, month day year  Cognition: cognitive ability appears impaired. Limited ability to ARNOLD backwards and unable to state number of quarters in $1.75  Consciousness: fluctuating alertness  Attention: currently impaired  Insight: limited  Judgement: limited        Lab Results:    I have personally reviewed all pertinent laboratory/tests results.    CBC  Lab Results   Component Value Date    WBC 7.24 12/20/2024    RBC 3.39 (L) 12/20/2024    HGB 9.6 (L) 12/20/2024    HCT 30.7 (L) 12/20/2024    MCH 28.3 12/20/2024    MCV 91 12/20/2024     12/20/2024    RDW 13.4 12/20/2024       BMP  Lab Results   Component Value Date     07/27/2015    K 3.5 12/20/2024     12/20/2024    CO2 29 12/20/2024    BUN 10 12/20/2024       LFTs  Lab Results   Component Value Date    ALB 3.8 11/19/2024    TP 6.2 (L) 11/19/2024    TBILI 0.73 11/19/2024       TSH  Lab Results   Component Value Date    TSH 1.50 05/10/2022       COVID-19  Lab Results   Component Value Date    SARSCOV2 Negative 12/19/2024       UDS  Lab Results   Component Value Date    AMPMETHUR Negative 11/16/2024    BARBTUR Negative 11/16/2024    BDZUR Negative 11/16/2024    THCUR Negative 11/16/2024    COCAINEUR Negative 11/16/2024    METHADONEUR Negative 11/16/2024    OPIATEUR Positive (A) 11/16/2024    PCPUR Negative 11/16/2024       Medical Alcohol Level  Lab Results   Component Value Date    ETOH <10 11/16/2024       EKG    Encounter Date: 11/16/24   ECG 12 lead   Result Value    Ventricular Rate 64    Atrial Rate 64    WI Interval 142    QRSD Interval 96    QT Interval 444    QTC Interval 459    P Axis 64    QRS Axis 68    T Wave Axis 43    Narrative    Normal sinus rhythm  Normal ECG  When compared with ECG of 16-Nov-2024 22:52,  No significant change was found  Confirmed by Antonio Justin ([id number]) on 11/19/2024 11:46:15 AM       Code Status: Level 1  - Full Code    Counseling / Coordination of Care  I have spent a total time of 45 minutes on 12/20/24 in caring for this patient including reviewing the chart, interviewing the patient, documenting in the medical record and discussing with the primary team.    Lokesh Guerrier MD